# Patient Record
Sex: FEMALE | Race: WHITE | Employment: OTHER | URBAN - METROPOLITAN AREA
[De-identification: names, ages, dates, MRNs, and addresses within clinical notes are randomized per-mention and may not be internally consistent; named-entity substitution may affect disease eponyms.]

---

## 2017-01-01 ENCOUNTER — HOSPICE ADMISSION (OUTPATIENT)
Dept: HOSPICE | Facility: HOSPICE | Age: 82
End: 2017-01-01

## 2017-01-01 ENCOUNTER — PATIENT OUTREACH (OUTPATIENT)
Dept: INTERNAL MEDICINE CLINIC | Age: 82
End: 2017-01-01

## 2017-01-01 ENCOUNTER — HOME CARE VISIT (OUTPATIENT)
Dept: SCHEDULING | Facility: HOME HEALTH | Age: 82
End: 2017-01-01

## 2017-01-01 ENCOUNTER — TELEPHONE (OUTPATIENT)
Dept: INTERNAL MEDICINE CLINIC | Age: 82
End: 2017-01-01

## 2017-01-01 ENCOUNTER — APPOINTMENT (OUTPATIENT)
Dept: CT IMAGING | Age: 82
DRG: 069 | End: 2017-01-01
Attending: EMERGENCY MEDICINE
Payer: MEDICARE

## 2017-01-01 ENCOUNTER — HOSPITAL ENCOUNTER (OUTPATIENT)
Dept: LAB | Age: 82
Discharge: HOME | End: 2017-10-05
Payer: MEDICARE

## 2017-01-01 ENCOUNTER — OFFICE VISIT (OUTPATIENT)
Dept: DERMATOLOGY | Facility: AMBULATORY SURGERY CENTER | Age: 82
End: 2017-01-01

## 2017-01-01 ENCOUNTER — OFFICE VISIT (OUTPATIENT)
Dept: INTERNAL MEDICINE CLINIC | Age: 82
End: 2017-01-01

## 2017-01-01 ENCOUNTER — HOSPITAL ENCOUNTER (EMERGENCY)
Age: 82
Discharge: HOME | End: 2017-05-11
Attending: FAMILY MEDICINE

## 2017-01-01 ENCOUNTER — HOSPITAL ENCOUNTER (INPATIENT)
Age: 82
LOS: 2 days | Discharge: HOME HEALTH | DRG: 069 | End: 2017-10-16
Attending: EMERGENCY MEDICINE | Admitting: INTERNAL MEDICINE
Payer: MEDICARE

## 2017-01-01 ENCOUNTER — HOSPITAL ENCOUNTER (EMERGENCY)
Age: 82
Discharge: HOME | End: 2017-09-29
Attending: EMERGENCY MEDICINE | Admitting: EMERGENCY MEDICINE
Payer: MEDICARE

## 2017-01-01 ENCOUNTER — APPOINTMENT (OUTPATIENT)
Dept: MRI IMAGING | Age: 82
DRG: 069 | End: 2017-01-01
Attending: INTERNAL MEDICINE
Payer: MEDICARE

## 2017-01-01 VITALS
DIASTOLIC BLOOD PRESSURE: 64 MMHG | TEMPERATURE: 97.8 F | SYSTOLIC BLOOD PRESSURE: 158 MMHG | OXYGEN SATURATION: 100 % | HEIGHT: 60 IN | RESPIRATION RATE: 16 BRPM | HEART RATE: 79 BPM | WEIGHT: 123.46 LBS | BODY MASS INDEX: 24.24 KG/M2

## 2017-01-01 VITALS
BODY MASS INDEX: 23.89 KG/M2 | DIASTOLIC BLOOD PRESSURE: 70 MMHG | TEMPERATURE: 98.9 F | OXYGEN SATURATION: 100 % | HEART RATE: 72 BPM | WEIGHT: 121.7 LBS | HEIGHT: 60 IN | SYSTOLIC BLOOD PRESSURE: 176 MMHG | RESPIRATION RATE: 20 BRPM

## 2017-01-01 VITALS
RESPIRATION RATE: 20 BRPM | BODY MASS INDEX: 24.54 KG/M2 | WEIGHT: 125 LBS | OXYGEN SATURATION: 96 % | TEMPERATURE: 96.9 F | DIASTOLIC BLOOD PRESSURE: 81 MMHG | HEART RATE: 75 BPM | SYSTOLIC BLOOD PRESSURE: 139 MMHG | HEIGHT: 60 IN

## 2017-01-01 VITALS
RESPIRATION RATE: 20 BRPM | HEART RATE: 72 BPM | OXYGEN SATURATION: 100 % | DIASTOLIC BLOOD PRESSURE: 68 MMHG | TEMPERATURE: 97.6 F | WEIGHT: 120 LBS | BODY MASS INDEX: 23.56 KG/M2 | HEIGHT: 60 IN | SYSTOLIC BLOOD PRESSURE: 158 MMHG

## 2017-01-01 VITALS
BODY MASS INDEX: 22.78 KG/M2 | OXYGEN SATURATION: 99 % | HEART RATE: 68 BPM | HEIGHT: 60 IN | TEMPERATURE: 98.2 F | DIASTOLIC BLOOD PRESSURE: 76 MMHG | WEIGHT: 116 LBS | RESPIRATION RATE: 19 BRPM | SYSTOLIC BLOOD PRESSURE: 170 MMHG

## 2017-01-01 VITALS
OXYGEN SATURATION: 97 % | RESPIRATION RATE: 21 BRPM | TEMPERATURE: 97.7 F | HEIGHT: 60 IN | BODY MASS INDEX: 23.75 KG/M2 | DIASTOLIC BLOOD PRESSURE: 69 MMHG | WEIGHT: 121 LBS | HEART RATE: 74 BPM | SYSTOLIC BLOOD PRESSURE: 153 MMHG

## 2017-01-01 VITALS
HEART RATE: 71 BPM | SYSTOLIC BLOOD PRESSURE: 120 MMHG | OXYGEN SATURATION: 99 % | RESPIRATION RATE: 18 BRPM | WEIGHT: 116 LBS | HEIGHT: 60 IN | DIASTOLIC BLOOD PRESSURE: 64 MMHG | TEMPERATURE: 97.9 F | BODY MASS INDEX: 22.78 KG/M2

## 2017-01-01 VITALS
OXYGEN SATURATION: 97 % | BODY MASS INDEX: 24.54 KG/M2 | HEART RATE: 93 BPM | HEIGHT: 60 IN | TEMPERATURE: 97.8 F | SYSTOLIC BLOOD PRESSURE: 177 MMHG | DIASTOLIC BLOOD PRESSURE: 98 MMHG | WEIGHT: 125 LBS | RESPIRATION RATE: 15 BRPM

## 2017-01-01 VITALS
OXYGEN SATURATION: 98 % | BODY MASS INDEX: 24.54 KG/M2 | RESPIRATION RATE: 16 BRPM | SYSTOLIC BLOOD PRESSURE: 126 MMHG | HEART RATE: 74 BPM | HEIGHT: 60 IN | TEMPERATURE: 98 F | DIASTOLIC BLOOD PRESSURE: 62 MMHG | WEIGHT: 125 LBS

## 2017-01-01 DIAGNOSIS — N39.0 URINARY TRACT INFECTION WITHOUT HEMATURIA, SITE UNSPECIFIED: Primary | ICD-10-CM

## 2017-01-01 DIAGNOSIS — Z79.4 TYPE 2 DIABETES MELLITUS WITHOUT COMPLICATION, WITH LONG-TERM CURRENT USE OF INSULIN (HCC): Primary | ICD-10-CM

## 2017-01-01 DIAGNOSIS — N39.0 FREQUENT UTI: ICD-10-CM

## 2017-01-01 DIAGNOSIS — I10 ESSENTIAL HYPERTENSION, BENIGN: ICD-10-CM

## 2017-01-01 DIAGNOSIS — R53.1 LEFT-SIDED WEAKNESS: ICD-10-CM

## 2017-01-01 DIAGNOSIS — L57.0 ACTINIC KERATOSIS: Primary | ICD-10-CM

## 2017-01-01 DIAGNOSIS — R31.9 URINARY TRACT INFECTION WITH HEMATURIA, SITE UNSPECIFIED: Primary | ICD-10-CM

## 2017-01-01 DIAGNOSIS — Z08 FOLLOW-UP SURVEILLANCE OF SKIN CANCER, ENCOUNTER FOR: ICD-10-CM

## 2017-01-01 DIAGNOSIS — Z79.4 TYPE 2 DIABETES MELLITUS WITHOUT COMPLICATION, WITH LONG-TERM CURRENT USE OF INSULIN (HCC): ICD-10-CM

## 2017-01-01 DIAGNOSIS — E46 PROTEIN-CALORIE MALNUTRITION, UNSPECIFIED SEVERITY (HCC): Primary | ICD-10-CM

## 2017-01-01 DIAGNOSIS — L08.9 INFECTED SKIN TEAR: ICD-10-CM

## 2017-01-01 DIAGNOSIS — E55.9 VITAMIN D DEFICIENCY: ICD-10-CM

## 2017-01-01 DIAGNOSIS — E78.00 PURE HYPERCHOLESTEROLEMIA: ICD-10-CM

## 2017-01-01 DIAGNOSIS — R47.1 DYSARTHRIA: Primary | ICD-10-CM

## 2017-01-01 DIAGNOSIS — I63.19 CEREBROVASCULAR ACCIDENT (CVA) DUE TO EMBOLISM OF OTHER PRECEREBRAL ARTERY (HCC): ICD-10-CM

## 2017-01-01 DIAGNOSIS — L82.1 SEBORRHEIC KERATOSES: ICD-10-CM

## 2017-01-01 DIAGNOSIS — T14.8XXA INFECTED SKIN TEAR: ICD-10-CM

## 2017-01-01 DIAGNOSIS — F41.8 DEPRESSION WITH ANXIETY: ICD-10-CM

## 2017-01-01 DIAGNOSIS — R63.4 WEIGHT LOSS: ICD-10-CM

## 2017-01-01 DIAGNOSIS — R62.7 FTT (FAILURE TO THRIVE) IN ADULT: ICD-10-CM

## 2017-01-01 DIAGNOSIS — I63.9 CEREBROVASCULAR ACCIDENT (CVA), UNSPECIFIED MECHANISM (HCC): ICD-10-CM

## 2017-01-01 DIAGNOSIS — Z11.1 SCREENING FOR TUBERCULOSIS: Primary | ICD-10-CM

## 2017-01-01 DIAGNOSIS — N39.0 URINARY TRACT INFECTION WITH HEMATURIA, SITE UNSPECIFIED: ICD-10-CM

## 2017-01-01 DIAGNOSIS — B37.31 VAGINAL YEAST INFECTION: Primary | ICD-10-CM

## 2017-01-01 DIAGNOSIS — E11.9 TYPE 2 DIABETES MELLITUS WITHOUT COMPLICATION, WITH LONG-TERM CURRENT USE OF INSULIN (HCC): ICD-10-CM

## 2017-01-01 DIAGNOSIS — Z85.828 FOLLOW-UP SURVEILLANCE OF SKIN CANCER, ENCOUNTER FOR: ICD-10-CM

## 2017-01-01 DIAGNOSIS — F32.A DEPRESSION, UNSPECIFIED DEPRESSION TYPE: ICD-10-CM

## 2017-01-01 DIAGNOSIS — G47.00 INSOMNIA, UNSPECIFIED TYPE: ICD-10-CM

## 2017-01-01 DIAGNOSIS — I25.10 ATHEROSCLEROSIS OF NATIVE CORONARY ARTERY OF NATIVE HEART WITHOUT ANGINA PECTORIS: ICD-10-CM

## 2017-01-01 DIAGNOSIS — R31.9 URINARY TRACT INFECTION WITH HEMATURIA, SITE UNSPECIFIED: ICD-10-CM

## 2017-01-01 DIAGNOSIS — E11.9 TYPE 2 DIABETES MELLITUS WITHOUT COMPLICATION, WITH LONG-TERM CURRENT USE OF INSULIN (HCC): Primary | ICD-10-CM

## 2017-01-01 DIAGNOSIS — L90.5 SCAR CONDITION AND FIBROSIS OF SKIN: ICD-10-CM

## 2017-01-01 DIAGNOSIS — L03.115 CELLULITIS OF RIGHT LOWER EXTREMITY: Primary | ICD-10-CM

## 2017-01-01 DIAGNOSIS — R30.0 DYSURIA: ICD-10-CM

## 2017-01-01 DIAGNOSIS — L03.116 CELLULITIS OF LEG, LEFT: Primary | ICD-10-CM

## 2017-01-01 DIAGNOSIS — L70.8 DEMODEX ACNE: ICD-10-CM

## 2017-01-01 DIAGNOSIS — Z85.828 HISTORY OF NONMELANOMA SKIN CANCER: ICD-10-CM

## 2017-01-01 DIAGNOSIS — R32 URINARY INCONTINENCE, UNSPECIFIED TYPE: Primary | ICD-10-CM

## 2017-01-01 DIAGNOSIS — S81.802A OPEN WOUND OF LEFT LOWER LEG, INITIAL ENCOUNTER: ICD-10-CM

## 2017-01-01 DIAGNOSIS — N39.0 RECURRENT UTI: ICD-10-CM

## 2017-01-01 DIAGNOSIS — N39.0 URINARY TRACT INFECTION WITH HEMATURIA, SITE UNSPECIFIED: Primary | ICD-10-CM

## 2017-01-01 DIAGNOSIS — Z11.1 SCREENING FOR TUBERCULOSIS: ICD-10-CM

## 2017-01-01 LAB
25(OH)D3+25(OH)D2 SERPL-MCNC: 22.2 NG/ML (ref 30–100)
ALBUMIN SERPL-MCNC: 3.4 G/DL (ref 3.5–5)
ALBUMIN SERPL-MCNC: 3.9 G/DL (ref 3.5–4.7)
ALBUMIN SERPL-MCNC: 4.2 G/DL (ref 3.5–4.7)
ALBUMIN/GLOB SERPL: 1.1 {RATIO} (ref 1.1–2.2)
ALBUMIN/GLOB SERPL: 2 {RATIO} (ref 1.2–2.2)
ALBUMIN/GLOB SERPL: 2.1 {RATIO} (ref 1.1–2.5)
ALP SERPL-CCNC: 112 IU/L (ref 39–117)
ALP SERPL-CCNC: 130 IU/L (ref 39–117)
ALP SERPL-CCNC: 139 U/L (ref 45–117)
ALT SERPL-CCNC: 12 IU/L (ref 0–32)
ALT SERPL-CCNC: 13 IU/L (ref 0–32)
ALT SERPL-CCNC: 22 U/L (ref 12–78)
ANION GAP BLD CALC-SCNC: 20 MMOL/L (ref 5–15)
ANION GAP SERPL CALC-SCNC: 8 MMOL/L (ref 5–15)
ANION GAP SERPL CALC-SCNC: 9 MMOL/L (ref 5–15)
APPEARANCE UR: ABNORMAL
APTT PPP: 47.6 SEC (ref 22.1–32.5)
AST SERPL-CCNC: 13 IU/L (ref 0–40)
AST SERPL-CCNC: 18 U/L (ref 15–37)
AST SERPL-CCNC: 20 IU/L (ref 0–40)
BACTERIA SPEC CULT: ABNORMAL
BACTERIA URNS QL MICRO: ABNORMAL /HPF
BASOPHILS # BLD AUTO: 0 X10E3/UL (ref 0–0.2)
BASOPHILS # BLD AUTO: 0 X10E3/UL (ref 0–0.2)
BASOPHILS # BLD: 0 K/UL (ref 0–0.1)
BASOPHILS NFR BLD AUTO: 0 %
BASOPHILS NFR BLD AUTO: 0 %
BASOPHILS NFR BLD: 0 % (ref 0–1)
BILIRUB SERPL-MCNC: 0.3 MG/DL (ref 0.2–1)
BILIRUB SERPL-MCNC: 0.3 MG/DL (ref 0–1.2)
BILIRUB SERPL-MCNC: 0.4 MG/DL (ref 0–1.2)
BILIRUB UR QL STRIP: NEGATIVE
BILIRUB UR QL: NEGATIVE
BUN BLD-MCNC: 6 MG/DL (ref 9–20)
BUN SERPL-MCNC: 10 MG/DL (ref 8–27)
BUN SERPL-MCNC: 7 MG/DL (ref 6–20)
BUN SERPL-MCNC: 8 MG/DL (ref 8–27)
BUN SERPL-MCNC: 9 MG/DL (ref 6–20)
BUN/CREAT SERPL: 12 (ref 12–20)
BUN/CREAT SERPL: 14 (ref 12–28)
BUN/CREAT SERPL: 18 (ref 11–26)
BUN/CREAT SERPL: 18 (ref 12–20)
CA-I BLD-MCNC: 1.18 MMOL/L (ref 1.12–1.32)
CALCIUM SERPL-MCNC: 10 MG/DL (ref 8.7–10.3)
CALCIUM SERPL-MCNC: 8.3 MG/DL (ref 8.5–10.1)
CALCIUM SERPL-MCNC: 8.9 MG/DL (ref 8.5–10.1)
CALCIUM SERPL-MCNC: 9 MG/DL (ref 8.7–10.3)
CC UR VC: ABNORMAL
CHLORIDE BLD-SCNC: 104 MMOL/L (ref 98–107)
CHLORIDE SERPL-SCNC: 101 MMOL/L (ref 96–106)
CHLORIDE SERPL-SCNC: 102 MMOL/L (ref 96–106)
CHLORIDE SERPL-SCNC: 105 MMOL/L (ref 97–108)
CHLORIDE SERPL-SCNC: 106 MMOL/L (ref 97–108)
CHOLEST SERPL-MCNC: 128 MG/DL
CHOLEST SERPL-MCNC: 148 MG/DL (ref 100–199)
CO2 BLD-SCNC: 24 MMOL/L (ref 21–32)
CO2 SERPL-SCNC: 24 MMOL/L (ref 18–29)
CO2 SERPL-SCNC: 25 MMOL/L (ref 21–32)
CO2 SERPL-SCNC: 26 MMOL/L (ref 21–32)
CO2 SERPL-SCNC: 29 MMOL/L (ref 18–29)
COLOR UR: ABNORMAL
CREAT BLD-MCNC: 0.5 MG/DL (ref 0.6–1.3)
CREAT SERPL-MCNC: 0.5 MG/DL (ref 0.55–1.02)
CREAT SERPL-MCNC: 0.56 MG/DL (ref 0.57–1)
CREAT SERPL-MCNC: 0.59 MG/DL (ref 0.55–1.02)
CREAT SERPL-MCNC: 0.59 MG/DL (ref 0.57–1)
EOSINOPHIL # BLD AUTO: 0.1 X10E3/UL (ref 0–0.4)
EOSINOPHIL # BLD AUTO: 0.1 X10E3/UL (ref 0–0.4)
EOSINOPHIL # BLD: 0.1 K/UL (ref 0–0.4)
EOSINOPHIL NFR BLD AUTO: 1 %
EOSINOPHIL NFR BLD AUTO: 1 %
EOSINOPHIL NFR BLD: 1 % (ref 0–7)
EPITH CASTS URNS QL MICRO: ABNORMAL /LPF
ERYTHROCYTE [DISTWIDTH] IN BLOOD BY AUTOMATED COUNT: 13.7 % (ref 11.5–14.5)
ERYTHROCYTE [DISTWIDTH] IN BLOOD BY AUTOMATED COUNT: 14 % (ref 11.5–14.5)
ERYTHROCYTE [DISTWIDTH] IN BLOOD BY AUTOMATED COUNT: 14.4 % (ref 12.3–15.4)
ERYTHROCYTE [DISTWIDTH] IN BLOOD BY AUTOMATED COUNT: 14.7 % (ref 12.3–15.4)
EST. AVERAGE GLUCOSE BLD GHB EST-MCNC: 151 MG/DL
EST. AVERAGE GLUCOSE BLD GHB EST-MCNC: 194 MG/DL
GLOBULIN SER CALC-MCNC: 2 G/DL (ref 1.5–4.5)
GLOBULIN SER CALC-MCNC: 2 G/DL (ref 1.5–4.5)
GLOBULIN SER CALC-MCNC: 3.1 G/DL (ref 2–4)
GLUCOSE BLD STRIP.AUTO-MCNC: 102 MG/DL (ref 65–100)
GLUCOSE BLD STRIP.AUTO-MCNC: 112 MG/DL (ref 65–100)
GLUCOSE BLD STRIP.AUTO-MCNC: 150 MG/DL (ref 65–100)
GLUCOSE BLD STRIP.AUTO-MCNC: 171 MG/DL (ref 65–100)
GLUCOSE BLD STRIP.AUTO-MCNC: 176 MG/DL (ref 65–100)
GLUCOSE BLD STRIP.AUTO-MCNC: 206 MG/DL (ref 65–100)
GLUCOSE BLD STRIP.AUTO-MCNC: 211 MG/DL (ref 65–100)
GLUCOSE BLD STRIP.AUTO-MCNC: 64 MG/DL (ref 65–100)
GLUCOSE BLD STRIP.AUTO-MCNC: 66 MG/DL (ref 65–100)
GLUCOSE BLD STRIP.AUTO-MCNC: 69 MG/DL (ref 65–100)
GLUCOSE BLD STRIP.AUTO-MCNC: 72 MG/DL (ref 65–100)
GLUCOSE BLD STRIP.AUTO-MCNC: 75 MG/DL (ref 65–100)
GLUCOSE BLD STRIP.AUTO-MCNC: 79 MG/DL (ref 65–100)
GLUCOSE BLD STRIP.AUTO-MCNC: 88 MG/DL (ref 65–100)
GLUCOSE BLD STRIP.AUTO-MCNC: 92 MG/DL (ref 65–100)
GLUCOSE BLD-MCNC: 68 MG/DL (ref 65–100)
GLUCOSE SERPL-MCNC: 158 MG/DL (ref 65–100)
GLUCOSE SERPL-MCNC: 204 MG/DL (ref 65–99)
GLUCOSE SERPL-MCNC: 287 MG/DL (ref 65–99)
GLUCOSE SERPL-MCNC: 81 MG/DL (ref 65–100)
GLUCOSE UR STRIP.AUTO-MCNC: NEGATIVE MG/DL
GLUCOSE UR-MCNC: NEGATIVE MG/DL
HBA1C MFR BLD: 6.9 % (ref 4.8–5.6)
HBA1C MFR BLD: 8.4 % (ref 4.8–5.6)
HCT VFR BLD AUTO: 40.1 % (ref 35–47)
HCT VFR BLD AUTO: 40.3 % (ref 35–47)
HCT VFR BLD AUTO: 42.2 % (ref 34–46.6)
HCT VFR BLD AUTO: 43 % (ref 34–46.6)
HCT VFR BLD CALC: 47 % (ref 35–47)
HDLC SERPL-MCNC: 45 MG/DL
HDLC SERPL-MCNC: 51 MG/DL
HDLC SERPL: 2.8 {RATIO} (ref 0–5)
HGB BLD-MCNC: 13.5 G/DL (ref 11.5–16)
HGB BLD-MCNC: 13.9 G/DL (ref 11.5–16)
HGB BLD-MCNC: 14.2 G/DL (ref 11.1–15.9)
HGB BLD-MCNC: 14.2 G/DL (ref 11.1–15.9)
HGB BLD-MCNC: 16 GM/DL (ref 11.5–16)
HGB UR QL STRIP: ABNORMAL
IMM GRANULOCYTES # BLD: 0 X10E3/UL (ref 0–0.1)
IMM GRANULOCYTES # BLD: 0 X10E3/UL (ref 0–0.1)
IMM GRANULOCYTES NFR BLD: 0 %
IMM GRANULOCYTES NFR BLD: 0 %
INR BLD: 1.8 (ref 0.9–1.2)
INR PPP: 1.5 (ref 0.9–1.1)
INTERPRETATION, 910389: NORMAL
KETONES P FAST UR STRIP-MCNC: NORMAL MG/DL
KETONES UR QL STRIP.AUTO: NEGATIVE MG/DL
LDLC SERPL CALC-MCNC: 64 MG/DL (ref 0–100)
LDLC SERPL CALC-MCNC: 72 MG/DL (ref 0–99)
LEUKOCYTE ESTERASE UR QL STRIP.AUTO: ABNORMAL
LIPID PROFILE,FLP: NORMAL
LYMPHOCYTES # BLD AUTO: 2.4 X10E3/UL (ref 0.7–3.1)
LYMPHOCYTES # BLD AUTO: 2.8 X10E3/UL (ref 0.7–3.1)
LYMPHOCYTES # BLD: 1.7 K/UL (ref 0.8–3.5)
LYMPHOCYTES NFR BLD AUTO: 20 %
LYMPHOCYTES NFR BLD AUTO: 26 %
LYMPHOCYTES NFR BLD: 16 % (ref 12–49)
Lab: NORMAL
MCH RBC QN AUTO: 30.6 PG (ref 26–34)
MCH RBC QN AUTO: 30.7 PG (ref 26.6–33)
MCH RBC QN AUTO: 31.1 PG (ref 26.6–33)
MCH RBC QN AUTO: 31.4 PG (ref 26–34)
MCHC RBC AUTO-ENTMCNC: 33 G/DL (ref 31.5–35.7)
MCHC RBC AUTO-ENTMCNC: 33.6 G/DL (ref 31.5–35.7)
MCHC RBC AUTO-ENTMCNC: 33.7 G/DL (ref 30–36.5)
MCHC RBC AUTO-ENTMCNC: 34.5 G/DL (ref 30–36.5)
MCV RBC AUTO: 90.9 FL (ref 80–99)
MCV RBC AUTO: 91 FL (ref 80–99)
MCV RBC AUTO: 92 FL (ref 79–97)
MCV RBC AUTO: 93 FL (ref 79–97)
MONOCYTES # BLD AUTO: 0.7 X10E3/UL (ref 0.1–0.9)
MONOCYTES # BLD AUTO: 0.9 X10E3/UL (ref 0.1–0.9)
MONOCYTES # BLD: 0.8 K/UL (ref 0–1)
MONOCYTES NFR BLD AUTO: 6 %
MONOCYTES NFR BLD AUTO: 8 %
MONOCYTES NFR BLD: 7 % (ref 5–13)
NEUTROPHILS # BLD AUTO: 6.9 X10E3/UL (ref 1.4–7)
NEUTROPHILS # BLD AUTO: 8.9 X10E3/UL (ref 1.4–7)
NEUTROPHILS NFR BLD AUTO: 65 %
NEUTROPHILS NFR BLD AUTO: 73 %
NEUTS SEG # BLD: 8.4 K/UL (ref 1.8–8)
NEUTS SEG NFR BLD: 76 % (ref 32–75)
NITRITE UR QL STRIP.AUTO: NEGATIVE
PH UR STRIP: 5 [PH] (ref 4.6–8)
PH UR STRIP: 7 [PH] (ref 5–8)
PLATELET # BLD AUTO: 159 K/UL (ref 150–400)
PLATELET # BLD AUTO: 165 X10E3/UL (ref 150–379)
PLATELET # BLD AUTO: 180 K/UL (ref 150–400)
PLATELET # BLD AUTO: 185 X10E3/UL (ref 150–379)
POTASSIUM BLD-SCNC: 3.4 MMOL/L (ref 3.5–5.1)
POTASSIUM SERPL-SCNC: 3.4 MMOL/L (ref 3.5–5.1)
POTASSIUM SERPL-SCNC: 3.9 MMOL/L (ref 3.5–5.2)
POTASSIUM SERPL-SCNC: 3.9 MMOL/L (ref 3.5–5.2)
POTASSIUM SERPL-SCNC: 4 MMOL/L (ref 3.5–5.1)
PROT SERPL-MCNC: 5.9 G/DL (ref 6–8.5)
PROT SERPL-MCNC: 6.2 G/DL (ref 6–8.5)
PROT SERPL-MCNC: 6.5 G/DL (ref 6.4–8.2)
PROT UR QL STRIP: NORMAL MG/DL
PROT UR STRIP-MCNC: NEGATIVE MG/DL
PROTHROMBIN TIME: 15.5 SEC (ref 9–11.1)
RBC # BLD AUTO: 4.41 M/UL (ref 3.8–5.2)
RBC # BLD AUTO: 4.43 M/UL (ref 3.8–5.2)
RBC # BLD AUTO: 4.57 X10E6/UL (ref 3.77–5.28)
RBC # BLD AUTO: 4.62 X10E6/UL (ref 3.77–5.28)
RBC #/AREA URNS HPF: ABNORMAL /HPF (ref 0–5)
SERVICE CMNT-IMP: ABNORMAL
SERVICE CMNT-IMP: NORMAL
SODIUM BLD-SCNC: 143 MMOL/L (ref 136–145)
SODIUM SERPL-SCNC: 139 MMOL/L (ref 136–145)
SODIUM SERPL-SCNC: 140 MMOL/L (ref 136–145)
SODIUM SERPL-SCNC: 142 MMOL/L (ref 134–144)
SODIUM SERPL-SCNC: 144 MMOL/L (ref 134–144)
SP GR UR REFRACTOMETRY: 1 (ref 1–1.03)
SP GR UR STRIP: 1.02 (ref 1–1.03)
THERAPEUTIC RANGE,PTTT: ABNORMAL SECS (ref 58–77)
TRIGL SERPL-MCNC: 125 MG/DL (ref 0–149)
TRIGL SERPL-MCNC: 95 MG/DL (ref ?–150)
TSH SERPL DL<=0.05 MIU/L-ACNC: 1.27 UIU/ML (ref 0.36–3.74)
UA UROBILINOGEN AMB POC: NORMAL (ref 0.2–1)
UA: UC IF INDICATED,UAUC: ABNORMAL
URINALYSIS CLARITY POC: CLEAR
URINALYSIS COLOR POC: YELLOW
URINE BLOOD POC: NORMAL
URINE LEUKOCYTES POC: NORMAL
URINE NITRITES POC: NEGATIVE
UROBILINOGEN UR QL STRIP.AUTO: 0.2 EU/DL (ref 0.2–1)
VLDLC SERPL CALC-MCNC: 19 MG/DL
VLDLC SERPL CALC-MCNC: 25 MG/DL (ref 5–40)
WBC # BLD AUTO: 10.7 X10E3/UL (ref 3.4–10.8)
WBC # BLD AUTO: 11 K/UL (ref 3.6–11)
WBC # BLD AUTO: 12.2 X10E3/UL (ref 3.4–10.8)
WBC # BLD AUTO: 8.9 K/UL (ref 3.6–11)
WBC URNS QL MICRO: ABNORMAL /HPF (ref 0–4)

## 2017-01-01 PROCEDURE — 36415 COLL VENOUS BLD VENIPUNCTURE: CPT | Performed by: EMERGENCY MEDICINE

## 2017-01-01 PROCEDURE — 96365 THER/PROPH/DIAG IV INF INIT: CPT

## 2017-01-01 PROCEDURE — 74011250636 HC RX REV CODE- 250/636: Performed by: INTERNAL MEDICINE

## 2017-01-01 PROCEDURE — L3710 EO ELAS W/METAL JNTS PRE OTS: HCPCS

## 2017-01-01 PROCEDURE — 82306 VITAMIN D 25 HYDROXY: CPT

## 2017-01-01 PROCEDURE — 81001 URINALYSIS AUTO W/SCOPE: CPT | Performed by: EMERGENCY MEDICINE

## 2017-01-01 PROCEDURE — 84443 ASSAY THYROID STIM HORMONE: CPT | Performed by: INTERNAL MEDICINE

## 2017-01-01 PROCEDURE — 70450 CT HEAD/BRAIN W/O DYE: CPT

## 2017-01-01 PROCEDURE — 74011250637 HC RX REV CODE- 250/637: Performed by: INTERNAL MEDICINE

## 2017-01-01 PROCEDURE — 80061 LIPID PANEL: CPT | Performed by: INTERNAL MEDICINE

## 2017-01-01 PROCEDURE — 97116 GAIT TRAINING THERAPY: CPT

## 2017-01-01 PROCEDURE — 74011250637 HC RX REV CODE- 250/637: Performed by: PSYCHIATRY & NEUROLOGY

## 2017-01-01 PROCEDURE — 74011250636 HC RX REV CODE- 250/636: Performed by: EMERGENCY MEDICINE

## 2017-01-01 PROCEDURE — 74011000250 HC RX REV CODE- 250: Performed by: EMERGENCY MEDICINE

## 2017-01-01 PROCEDURE — 99283 EMERGENCY DEPT VISIT LOW MDM: CPT

## 2017-01-01 PROCEDURE — 93306 TTE W/DOPPLER COMPLETE: CPT

## 2017-01-01 PROCEDURE — 97165 OT EVAL LOW COMPLEX 30 MIN: CPT

## 2017-01-01 PROCEDURE — 80048 BASIC METABOLIC PNL TOTAL CA: CPT | Performed by: INTERNAL MEDICINE

## 2017-01-01 PROCEDURE — 85610 PROTHROMBIN TIME: CPT

## 2017-01-01 PROCEDURE — G8979 MOBILITY GOAL STATUS: HCPCS

## 2017-01-01 PROCEDURE — 65660000000 HC RM CCU STEPDOWN

## 2017-01-01 PROCEDURE — 97161 PT EVAL LOW COMPLEX 20 MIN: CPT

## 2017-01-01 PROCEDURE — 85027 COMPLETE CBC AUTOMATED: CPT | Performed by: INTERNAL MEDICINE

## 2017-01-01 PROCEDURE — 99285 EMERGENCY DEPT VISIT HI MDM: CPT

## 2017-01-01 PROCEDURE — 85025 COMPLETE CBC W/AUTO DIFF WBC: CPT | Performed by: EMERGENCY MEDICINE

## 2017-01-01 PROCEDURE — 87077 CULTURE AEROBIC IDENTIFY: CPT | Performed by: EMERGENCY MEDICINE

## 2017-01-01 PROCEDURE — 87186 SC STD MICRODIL/AGAR DIL: CPT | Performed by: EMERGENCY MEDICINE

## 2017-01-01 PROCEDURE — 85730 THROMBOPLASTIN TIME PARTIAL: CPT | Performed by: EMERGENCY MEDICINE

## 2017-01-01 PROCEDURE — 36415 COLL VENOUS BLD VENIPUNCTURE: CPT | Performed by: INTERNAL MEDICINE

## 2017-01-01 PROCEDURE — G8978 MOBILITY CURRENT STATUS: HCPCS

## 2017-01-01 PROCEDURE — 87086 URINE CULTURE/COLONY COUNT: CPT | Performed by: EMERGENCY MEDICINE

## 2017-01-01 PROCEDURE — 74011636637 HC RX REV CODE- 636/637: Performed by: INTERNAL MEDICINE

## 2017-01-01 PROCEDURE — 74011636320 HC RX REV CODE- 636/320: Performed by: EMERGENCY MEDICINE

## 2017-01-01 PROCEDURE — 70551 MRI BRAIN STEM W/O DYE: CPT

## 2017-01-01 PROCEDURE — 80047 BASIC METABLC PNL IONIZED CA: CPT

## 2017-01-01 PROCEDURE — 74011000258 HC RX REV CODE- 258: Performed by: EMERGENCY MEDICINE

## 2017-01-01 PROCEDURE — 70496 CT ANGIOGRAPHY HEAD: CPT

## 2017-01-01 PROCEDURE — 83036 HEMOGLOBIN GLYCOSYLATED A1C: CPT

## 2017-01-01 PROCEDURE — 82962 GLUCOSE BLOOD TEST: CPT

## 2017-01-01 PROCEDURE — 97535 SELF CARE MNGMENT TRAINING: CPT

## 2017-01-01 PROCEDURE — 80053 COMPREHEN METABOLIC PANEL: CPT

## 2017-01-01 PROCEDURE — 80053 COMPREHEN METABOLIC PANEL: CPT | Performed by: EMERGENCY MEDICINE

## 2017-01-01 PROCEDURE — 85025 COMPLETE CBC W/AUTO DIFF WBC: CPT

## 2017-01-01 PROCEDURE — 36415 COLL VENOUS BLD VENIPUNCTURE: CPT

## 2017-01-01 PROCEDURE — 85610 PROTHROMBIN TIME: CPT | Performed by: EMERGENCY MEDICINE

## 2017-01-01 PROCEDURE — 74011000258 HC RX REV CODE- 258: Performed by: INTERNAL MEDICINE

## 2017-01-01 RX ORDER — TRAZODONE HYDROCHLORIDE 50 MG/1
TABLET ORAL
Qty: 30 TAB | Refills: 0 | Status: SHIPPED | OUTPATIENT
Start: 2017-01-01

## 2017-01-01 RX ORDER — HALOPERIDOL 5 MG/ML
1 INJECTION INTRAMUSCULAR ONCE
Status: COMPLETED | OUTPATIENT
Start: 2017-01-01 | End: 2017-01-01

## 2017-01-01 RX ORDER — PRAVASTATIN SODIUM 10 MG/1
20 TABLET ORAL
Status: DISCONTINUED | OUTPATIENT
Start: 2017-01-01 | End: 2017-01-01 | Stop reason: HOSPADM

## 2017-01-01 RX ORDER — AMLODIPINE AND BENAZEPRIL HYDROCHLORIDE 5; 40 MG/1; MG/1
CAPSULE ORAL
Qty: 90 CAP | Refills: 0 | Status: SHIPPED | OUTPATIENT
Start: 2017-01-01 | End: 2017-01-01 | Stop reason: DRUGHIGH

## 2017-01-01 RX ORDER — FLUCONAZOLE 150 MG/1
150 TABLET ORAL
Qty: 1 TAB | Refills: 0 | Status: SHIPPED | OUTPATIENT
Start: 2017-01-01 | End: 2017-01-01

## 2017-01-01 RX ORDER — ENOXAPARIN SODIUM 100 MG/ML
40 INJECTION SUBCUTANEOUS EVERY 24 HOURS
Status: DISCONTINUED | OUTPATIENT
Start: 2017-01-01 | End: 2017-01-01 | Stop reason: DRUGHIGH

## 2017-01-01 RX ORDER — ASPIRIN 81 MG/1
81 TABLET ORAL DAILY
Status: DISCONTINUED | OUTPATIENT
Start: 2017-01-01 | End: 2017-01-01 | Stop reason: HOSPADM

## 2017-01-01 RX ORDER — MAGNESIUM SULFATE 100 %
4 CRYSTALS MISCELLANEOUS AS NEEDED
Status: DISCONTINUED | OUTPATIENT
Start: 2017-01-01 | End: 2017-01-01 | Stop reason: HOSPADM

## 2017-01-01 RX ORDER — PEN NEEDLE, DIABETIC 31 GX5/16"
NEEDLE, DISPOSABLE MISCELLANEOUS
Refills: 0 | COMMUNITY
Start: 2017-01-01

## 2017-01-01 RX ORDER — POTASSIUM CHLORIDE 750 MG/1
20 TABLET, FILM COATED, EXTENDED RELEASE ORAL
Status: COMPLETED | OUTPATIENT
Start: 2017-01-01 | End: 2017-01-01

## 2017-01-01 RX ORDER — DOXYCYCLINE 100 MG/1
100 CAPSULE ORAL 2 TIMES DAILY
COMMUNITY
End: 2017-01-01

## 2017-01-01 RX ORDER — CIPROFLOXACIN 250 MG/1
250 TABLET, FILM COATED ORAL 2 TIMES DAILY
Qty: 14 TAB | Refills: 0 | Status: SHIPPED | OUTPATIENT
Start: 2017-01-01 | End: 2017-01-01 | Stop reason: SDUPTHER

## 2017-01-01 RX ORDER — FACIAL-BODY WIPES
10 EACH TOPICAL DAILY PRN
Status: DISCONTINUED | OUTPATIENT
Start: 2017-01-01 | End: 2017-01-01 | Stop reason: HOSPADM

## 2017-01-01 RX ORDER — CIPROFLOXACIN 250 MG/1
250 TABLET, FILM COATED ORAL 2 TIMES DAILY
Qty: 10 TAB | Refills: 0 | Status: SHIPPED | OUTPATIENT
Start: 2017-01-01 | End: 2017-01-01

## 2017-01-01 RX ORDER — INSULIN GLARGINE 100 [IU]/ML
10 INJECTION, SOLUTION SUBCUTANEOUS DAILY
Status: DISCONTINUED | OUTPATIENT
Start: 2017-01-01 | End: 2017-01-01 | Stop reason: HOSPADM

## 2017-01-01 RX ORDER — LEVOFLOXACIN 250 MG/1
TABLET ORAL
Refills: 0 | COMMUNITY
Start: 2017-01-01

## 2017-01-01 RX ORDER — FLUCONAZOLE 150 MG/1
150 TABLET ORAL DAILY
Qty: 1 TAB | Refills: 0 | Status: SHIPPED | OUTPATIENT
Start: 2017-01-01 | End: 2017-01-01

## 2017-01-01 RX ORDER — ACETAMINOPHEN 325 MG/1
650 TABLET ORAL
Status: DISCONTINUED | OUTPATIENT
Start: 2017-01-01 | End: 2017-01-01 | Stop reason: HOSPADM

## 2017-01-01 RX ORDER — FLUCONAZOLE 200 MG/1
200 TABLET ORAL DAILY
Qty: 7 TAB | Refills: 0 | Status: SHIPPED | OUTPATIENT
Start: 2017-01-01 | End: 2017-01-01

## 2017-01-01 RX ORDER — SODIUM CHLORIDE 9 MG/ML
50 INJECTION, SOLUTION INTRAVENOUS
Status: COMPLETED | OUTPATIENT
Start: 2017-01-01 | End: 2017-01-01

## 2017-01-01 RX ORDER — SODIUM CHLORIDE 9 MG/ML
50 INJECTION, SOLUTION INTRAVENOUS CONTINUOUS
Status: DISCONTINUED | OUTPATIENT
Start: 2017-01-01 | End: 2017-01-01

## 2017-01-01 RX ORDER — LEVOFLOXACIN 250 MG/1
250 TABLET ORAL DAILY
Qty: 10 TAB | Refills: 0 | Status: SHIPPED | OUTPATIENT
Start: 2017-01-01 | End: 2017-01-01

## 2017-01-01 RX ORDER — LEVOFLOXACIN 250 MG/1
250 TABLET ORAL DAILY
Qty: 10 TAB | Refills: 0 | Status: SHIPPED | OUTPATIENT
Start: 2017-01-01 | End: 2017-01-01 | Stop reason: SDUPTHER

## 2017-01-01 RX ORDER — INSULIN GLARGINE 100 [IU]/ML
25 INJECTION, SOLUTION SUBCUTANEOUS DAILY
Qty: 15 ML | Refills: 0 | Status: SHIPPED | OUTPATIENT
Start: 2017-01-01

## 2017-01-01 RX ORDER — AMLODIPINE AND BENAZEPRIL HYDROCHLORIDE 5; 40 MG/1; MG/1
CAPSULE ORAL
Qty: 90 CAP | Refills: 0 | Status: SHIPPED | OUTPATIENT
Start: 2017-01-01

## 2017-01-01 RX ORDER — ACETAMINOPHEN 650 MG/1
650 SUPPOSITORY RECTAL
Status: DISCONTINUED | OUTPATIENT
Start: 2017-01-01 | End: 2017-01-01 | Stop reason: HOSPADM

## 2017-01-01 RX ORDER — ASPIRIN 81 MG/1
81 TABLET ORAL DAILY
Qty: 30 TAB | Refills: 0 | Status: SHIPPED | OUTPATIENT
Start: 2017-01-01 | End: 2017-01-01

## 2017-01-01 RX ORDER — INSULIN GLARGINE 100 [IU]/ML
INJECTION, SOLUTION SUBCUTANEOUS
Qty: 1 VIAL | Refills: 11 | Status: SHIPPED | OUTPATIENT
Start: 2017-01-01 | End: 2017-01-01

## 2017-01-01 RX ORDER — TRAZODONE HYDROCHLORIDE 50 MG/1
25 TABLET ORAL
Qty: 30 TAB | Refills: 0 | Status: SHIPPED | OUTPATIENT
Start: 2017-01-01 | End: 2017-01-01 | Stop reason: SDUPTHER

## 2017-01-01 RX ORDER — INSULIN LISPRO 100 [IU]/ML
INJECTION, SOLUTION INTRAVENOUS; SUBCUTANEOUS
Status: DISCONTINUED | OUTPATIENT
Start: 2017-01-01 | End: 2017-01-01 | Stop reason: HOSPADM

## 2017-01-01 RX ORDER — DOXYCYCLINE HYCLATE 100 MG
100 TABLET ORAL 2 TIMES DAILY
Qty: 20 TAB | Refills: 0 | Status: SHIPPED | OUTPATIENT
Start: 2017-01-01 | End: 2017-01-01

## 2017-01-01 RX ORDER — INSULIN GLARGINE 100 [IU]/ML
32 INJECTION, SOLUTION SUBCUTANEOUS DAILY
Status: ON HOLD | COMMUNITY
End: 2017-01-01

## 2017-01-01 RX ORDER — FLUCONAZOLE 200 MG/1
200 TABLET ORAL DAILY
Status: DISCONTINUED | OUTPATIENT
Start: 2017-01-01 | End: 2017-01-01 | Stop reason: HOSPADM

## 2017-01-01 RX ORDER — MIRTAZAPINE 7.5 MG/1
7.5 TABLET, FILM COATED ORAL
Qty: 30 TAB | Refills: 4 | Status: SHIPPED | OUTPATIENT
Start: 2017-01-01 | End: 2017-01-01

## 2017-01-01 RX ORDER — MIRTAZAPINE 7.5 MG/1
TABLET, FILM COATED ORAL
Refills: 4 | COMMUNITY
Start: 2017-01-01

## 2017-01-01 RX ORDER — PEN NEEDLE, DIABETIC 31 GX5/16"
NEEDLE, DISPOSABLE MISCELLANEOUS
Qty: 90 PEN NEEDLE | Refills: 0 | Status: SHIPPED | OUTPATIENT
Start: 2017-01-01 | End: 2017-01-01

## 2017-01-01 RX ORDER — DEXTROSE 50 % IN WATER (D50W) INTRAVENOUS SYRINGE
12.5-25 AS NEEDED
Status: DISCONTINUED | OUTPATIENT
Start: 2017-01-01 | End: 2017-01-01 | Stop reason: HOSPADM

## 2017-01-01 RX ORDER — AMLODIPINE AND BENAZEPRIL HYDROCHLORIDE 5; 40 MG/1; MG/1
1 CAPSULE ORAL
COMMUNITY

## 2017-01-01 RX ORDER — INSULIN GLARGINE 100 [IU]/ML
20 INJECTION, SOLUTION SUBCUTANEOUS DAILY
Status: DISCONTINUED | OUTPATIENT
Start: 2017-01-01 | End: 2017-01-01

## 2017-01-01 RX ORDER — SODIUM CHLORIDE 0.9 % (FLUSH) 0.9 %
10 SYRINGE (ML) INJECTION
Status: CANCELLED | OUTPATIENT
Start: 2017-01-01 | End: 2017-01-01

## 2017-01-01 RX ORDER — SODIUM CHLORIDE 0.9 % (FLUSH) 0.9 %
5-10 SYRINGE (ML) INJECTION EVERY 8 HOURS
Status: DISCONTINUED | OUTPATIENT
Start: 2017-01-01 | End: 2017-01-01 | Stop reason: HOSPADM

## 2017-01-01 RX ORDER — SODIUM CHLORIDE 0.9 % (FLUSH) 0.9 %
5-10 SYRINGE (ML) INJECTION AS NEEDED
Status: DISCONTINUED | OUTPATIENT
Start: 2017-01-01 | End: 2017-01-01 | Stop reason: HOSPADM

## 2017-01-01 RX ORDER — CALCIUM CARBONATE 200(500)MG
200 TABLET,CHEWABLE ORAL
Status: DISCONTINUED | OUTPATIENT
Start: 2017-01-01 | End: 2017-01-01 | Stop reason: HOSPADM

## 2017-01-01 RX ORDER — LORAZEPAM 0.5 MG/1
0.5 TABLET ORAL 2 TIMES DAILY
Qty: 14 TAB | Refills: 0 | Status: SHIPPED | OUTPATIENT
Start: 2017-01-01 | End: 2017-01-01

## 2017-01-01 RX ORDER — SODIUM CHLORIDE 9 MG/ML
50 INJECTION, SOLUTION INTRAVENOUS
Status: CANCELLED | OUTPATIENT
Start: 2017-01-01 | End: 2017-01-01

## 2017-01-01 RX ORDER — LABETALOL HYDROCHLORIDE 5 MG/ML
5 INJECTION, SOLUTION INTRAVENOUS
Status: DISCONTINUED | OUTPATIENT
Start: 2017-01-01 | End: 2017-01-01

## 2017-01-01 RX ORDER — NITROFURANTOIN 25; 75 MG/1; MG/1
100 CAPSULE ORAL 2 TIMES DAILY
Qty: 14 CAP | Refills: 0 | Status: SHIPPED | OUTPATIENT
Start: 2017-01-01 | End: 2017-01-01

## 2017-01-01 RX ORDER — PRAVASTATIN SODIUM 10 MG/1
10 TABLET ORAL
Status: DISCONTINUED | OUTPATIENT
Start: 2017-01-01 | End: 2017-01-01

## 2017-01-01 RX ORDER — DABIGATRAN ETEXILATE 150 MG/1
150 CAPSULE ORAL EVERY 12 HOURS
Status: DISCONTINUED | OUTPATIENT
Start: 2017-01-01 | End: 2017-01-01 | Stop reason: HOSPADM

## 2017-01-01 RX ORDER — SODIUM CHLORIDE 0.9 % (FLUSH) 0.9 %
10 SYRINGE (ML) INJECTION
Status: COMPLETED | OUTPATIENT
Start: 2017-01-01 | End: 2017-01-01

## 2017-01-01 RX ADMIN — INSULIN LISPRO 2 UNITS: 100 INJECTION, SOLUTION INTRAVENOUS; SUBCUTANEOUS at 23:23

## 2017-01-01 RX ADMIN — POTASSIUM CHLORIDE 20 MEQ: 750 TABLET, FILM COATED, EXTENDED RELEASE ORAL at 22:10

## 2017-01-01 RX ADMIN — Medication 10 ML: at 21:09

## 2017-01-01 RX ADMIN — BENAZEPRIL HYDROCHLORIDE: 10 TABLET, FILM COATED ORAL at 12:13

## 2017-01-01 RX ADMIN — ASPIRIN 81 MG: 81 TABLET, COATED ORAL at 08:39

## 2017-01-01 RX ADMIN — INSULIN LISPRO 2 UNITS: 100 INJECTION, SOLUTION INTRAVENOUS; SUBCUTANEOUS at 08:39

## 2017-01-01 RX ADMIN — DABIGATRAN ETEXILATE MESYLATE 150 MG: 150 CAPSULE ORAL at 09:34

## 2017-01-01 RX ADMIN — DABIGATRAN ETEXILATE MESYLATE 150 MG: 150 CAPSULE ORAL at 08:45

## 2017-01-01 RX ADMIN — SODIUM CHLORIDE 75 ML/HR: 900 INJECTION, SOLUTION INTRAVENOUS at 18:04

## 2017-01-01 RX ADMIN — HALOPERIDOL LACTATE 1 MG: 5 INJECTION, SOLUTION INTRAMUSCULAR at 20:56

## 2017-01-01 RX ADMIN — Medication 10 ML: at 15:52

## 2017-01-01 RX ADMIN — INSULIN LISPRO 3 UNITS: 100 INJECTION, SOLUTION INTRAVENOUS; SUBCUTANEOUS at 16:47

## 2017-01-01 RX ADMIN — Medication 10 ML: at 22:13

## 2017-01-01 RX ADMIN — INSULIN GLARGINE 10 UNITS: 100 INJECTION, SOLUTION SUBCUTANEOUS at 08:38

## 2017-01-01 RX ADMIN — NITROGLYCERIN 1 INCH: 20 OINTMENT TOPICAL at 11:43

## 2017-01-01 RX ADMIN — PRAVASTATIN SODIUM 10 MG: 10 TABLET ORAL at 23:21

## 2017-01-01 RX ADMIN — SODIUM CHLORIDE 50 ML/HR: 900 INJECTION, SOLUTION INTRAVENOUS at 14:53

## 2017-01-01 RX ADMIN — DOXYCYCLINE 100 MG: 100 INJECTION, POWDER, LYOPHILIZED, FOR SOLUTION INTRAVENOUS at 11:35

## 2017-01-01 RX ADMIN — DABIGATRAN ETEXILATE MESYLATE 150 MG: 150 CAPSULE ORAL at 22:09

## 2017-01-01 RX ADMIN — CEFTRIAXONE 1 G: 1 INJECTION, POWDER, FOR SOLUTION INTRAMUSCULAR; INTRAVENOUS at 15:51

## 2017-01-01 RX ADMIN — IOPAMIDOL 100 ML: 755 INJECTION, SOLUTION INTRAVENOUS at 14:53

## 2017-01-01 RX ADMIN — INSULIN LISPRO 2 UNITS: 100 INJECTION, SOLUTION INTRAVENOUS; SUBCUTANEOUS at 12:06

## 2017-01-01 RX ADMIN — FLUCONAZOLE 200 MG: 200 TABLET ORAL at 08:39

## 2017-01-01 RX ADMIN — Medication 10 ML: at 06:50

## 2017-01-01 RX ADMIN — Medication 10 ML: at 23:21

## 2017-01-01 RX ADMIN — Medication 10 ML: at 03:49

## 2017-01-01 RX ADMIN — INSULIN LISPRO 2 UNITS: 100 INJECTION, SOLUTION INTRAVENOUS; SUBCUTANEOUS at 11:44

## 2017-01-01 RX ADMIN — Medication 10 ML: at 14:53

## 2017-01-01 RX ADMIN — ASPIRIN 81 MG: 81 TABLET, COATED ORAL at 12:06

## 2017-01-16 ENCOUNTER — OFFICE VISIT (OUTPATIENT)
Dept: DERMATOLOGY | Facility: AMBULATORY SURGERY CENTER | Age: 82
End: 2017-01-16

## 2017-01-16 VITALS
HEIGHT: 60 IN | RESPIRATION RATE: 16 BRPM | OXYGEN SATURATION: 98 % | DIASTOLIC BLOOD PRESSURE: 66 MMHG | TEMPERATURE: 98 F | BODY MASS INDEX: 26.31 KG/M2 | HEART RATE: 99 BPM | SYSTOLIC BLOOD PRESSURE: 114 MMHG | WEIGHT: 134 LBS

## 2017-01-16 DIAGNOSIS — C44.729 SQUAMOUS CELL CARCINOMA OF LOWER LEG, LEFT: Primary | ICD-10-CM

## 2017-01-16 RX ORDER — LIDOCAINE HYDROCHLORIDE AND EPINEPHRINE 10; 10 MG/ML; UG/ML
3 INJECTION, SOLUTION INFILTRATION; PERINEURAL ONCE
Qty: 3 ML | Refills: 0
Start: 2017-01-16 | End: 2017-01-16

## 2017-01-16 RX ORDER — BUPIVACAINE HYDROCHLORIDE AND EPINEPHRINE 2.5; 5 MG/ML; UG/ML
INJECTION, SOLUTION INFILTRATION; PERINEURAL
Qty: 1.5 ML | Refills: 0
Start: 2017-01-16 | End: 2017-01-01

## 2017-01-16 NOTE — MR AVS SNAPSHOT
Visit Information Date & Time Provider Department Dept. Phone Encounter #  
 1/16/2017 10:30 AM MD Jay Medinairajyothi [phone] Your Appointments 3/6/2017  1:15 PM  
ROUTINE CARE with Macy Owens MD  
[facility] Internal Medicine [facility]) Appt Note: 3 month f/u  
 [address] [facility] [address] [facility] [address]  
[phone]  
  
   
 [address] [facility] [address] Upcoming Health Maintenance Date Due Pneumococcal 65+ High/Highest Risk (2 of 2 - PPSV23) 1/1/2006 INFLUENZA AGE 9 TO ADULT 8/1/2016 EYE EXAM RETINAL OR DILATED Q1 8/6/2016 FOOT EXAM Q1 8/21/2016 MICROALBUMIN Q1 9/3/2016 HEMOGLOBIN A1C Q6M 2/5/2017 DTaP/Tdap/Td series (1 - Tdap) 9/1/2017* LIPID PANEL Q1 8/5/2017 GLAUCOMA SCREENING Q2Y 8/6/2017 MEDICARE YEARLY EXAM 9/23/2017 *Topic was postponed. The date shown is not the original due date. Allergies as of 1/16/2017  Review Complete On: 1/16/2017 By: Joshua Washington LPN Severity Noted Reaction Type Reactions Aleve [Naproxen Sodium] Medium 02/15/2011   Side Effect Other (comments) Caused stomach ulcer and thrush Eliquis [Apixaban]  08/06/2016    Other (comments)  
 dizziness Labetalol  05/09/2016    Other (comments)  
 dizzyness Metformin  08/21/2015    Diarrhea With both IR and ER Plavix [Clopidogrel]  08/06/2016    Other (comments) Pt. Dev Johnston it was not effective Current Immunizations  Reviewed on 5/8/2016 Name Date Influenza Vaccine 10/9/2013 Pneumococcal Vaccine (Unspecified Type) 1/1/2001 Td, Adsorbed PF 2/12/2015 12:17 PM  
 Zoster Vaccine, Live 10/9/2013 Not reviewed this visit Vitals BP Pulse Temp Resp Height(growth percentile) Weight(growth percentile) 114/66 99 98 °F (36.7 °C) (Oral) 16 5' (1.524 m) 134 lb (60.8 kg) SpO2 BMI OB Status Smoking Status 98% 26.17 kg/m2 Hysterectomy Never Smoker Vitals History BMI and BSA Data Body Mass Index Body Surface Area  
 26.17 kg/m 2 1.6 m 2 Preferred Pharmacy Pharmacy Name Phone [facility] [phone], [address] AT [place] [address] [phone] Your Updated Medication List  
  
   
This list is accurate as of: 1/16/17 10:57 AM.  Always use your most recent med list.  
  
  
  
  
 acetaminophen 650 mg CR tablet Commonly known as:  TYLENOL ARTHRITIS PAIN Take 1 Tab by mouth two (2) times daily as needed for Pain. amLODIPine-benazepril 10-40 mg per capsule Commonly known as:  LOTREL  
TAKE ONE CAPSULE BY MOUTH AT BEDTIME FOR HYPERTENSION  
  
 BD INSULIN PEN NEEDLE UF MINI 31 gauge x 3/16\" Ndle Generic drug:  Insulin Needles (Disposable) USE TO INJECT LANTUS EVERY DAY  
  
 calcium carbonate 200 mg calcium (500 mg) Chew Commonly known as:  TUMS Take 1 Tab by mouth two (2) times daily as needed. cefUROXime 250 mg tablet Commonly known as:  CEFTIN Take 1 Tab by mouth every fourty-eight (48) hours. fluorouracil 5 % chemo cream  
Commonly known as:  EFUDEX Apply a thin layer to area on the left cheek twice daily for 2 weeks then stop for 1 week then apply for 2 weeks longer. insulin glargine 100 unit/mL (3 mL) pen Commonly known as:  LANTUS SOLOSTAR  
30 Units by SubCUTAneous route daily. D/C glipizide LORazepam 0.5 mg tablet Commonly known as:  ATIVAN Take 1 Tab by mouth two (2) times a day. Max Daily Amount: 1 mg. mupirocin 2 % ointment Commonly known as:  [facility] Apply  to affected area daily. * OTHER Wheel chair Use every day * OTHER Please discontinue zoloft and lorazepam  
  
 PRADAXA 150 mg capsule Generic drug:  dabigatran etexilate TAKE ONE CAPSULE BY MOUTH EVERY 12 HOURS FOR CEREBROVASCULAR ACCIDENT  
  
 pravastatin 10 mg tablet Commonly known as:  PRAVACHOL  
TAKE 1 TABLET BY MOUTH NIGHTLY. DISCONTINUE LIPITOR. * Notice: This list has 2 medication(s) that are the same as other medications prescribed for you. Read the directions carefully, and ask your doctor or other care provider to review them with you. Patient Instructions WOUND CARE INSTRUCTIONS 1. Keep the dressing clean and dry and do not remove for 48 hours. 2. Then change the dressing once a day as follows: 
a. Wash hands before and after each dressing change. b. Remove dressing and wash site gently with mild soap and water, rinse, and pat dry. 
c. Apply an ointment (Bacitracin, Polysporin, Neosporin, Petroleum jelly or Aquaphor). d. Apply a non-stick (Telfa) dressing or Band-Aid to cover the wound. Remove pressure bandage Wednesday, then wash gently and apply Vaseline and a band-aid to site daily for 1 week. 3. Watch for: BLEEDING: A small amount of drainage may occur. If bleeding occurs, elevate and rest the surgery site. Apply gauze and steady pressure for 15 minutes. If bleeding continues, call this office. INFECTION: Signs of infection include increased redness, pain, warmth, drainage of pus, and fever. If this occurs, call this office. 4. Special Instructions (follow any that are checked): ·  You have stitches that need to be removed in 0 days ·  Avoid bending at the waist and heavy lifting for two days. ·  Sleep with your head elevated for the next two nights. ·  Rest the surgery site and keep it elevated as much as possible for two days. ·  You may apply an ice-pack for 10-15 minutes every waking hour for the rest of the day. ·  Eat a soft diet and avoid hot food and hot drinks for the rest of the day. ·  Other instructions: Follow up as needed Take Tylenol for pain as needed.  
 
 
Once the site is healed with no remaining bandages or open areas, protect your surgical site and scar from the sun, as this area will be more sensitive. Use a broad spectrum sunscreen SPF 30 or higher daily, and a chemical free product (one containing zinc oxide or titanium dioxide) is a good choice if the area is sensitive. You may begin to gently massage the surgical site in 2-3 weeks, rubbing in a circular motion along the scar. This can help reduce swelling and thickness of a scar. A scar cream may be used beginnning 1 month after the surgery. If you have any questions or concerns, please call our office Monday through Friday at [phone]. Introducing [facility]! Dear Maranda Sterling: 
Thank you for requesting a [employer] account. Our records indicate that you already have an active [employer] account. You can access your account anytime at [email or web address] Did you know that you can access your hospital and ER discharge instructions at any time in [employer]? You can also review all of your test results from your hospital stay or ER visit. Additional Information If you have questions, please visit the Frequently Asked Questions section of the [employer] website at [email or web address] Remember, [employer] is NOT to be used for urgent needs. For medical emergencies, dial 911. Now available from your iPhone and Android! Please provide this summary of care documentation to your next provider. Your primary care clinician is listed as Benedict Balderrama. If you have any questions after today's visit, please call [phone].

## 2017-01-16 NOTE — PROGRESS NOTES
This note is written by Manan Olivera, as dictated by Heber Gaston. Jeff Venegas MD.    CC: Squamous cell carcinoma on the left medial calf    History of present illness:     Daksha Quintero is a 80 y.o. female referred by El Arroyo DNP. 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 This is a new squamous cell carcinoma present since the summer described as a persistent scaly lesion with no prior treatment. Her daughter reports she delayed treatment due to strokes. 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 She is feeling well and in her usual state of health today. She has no pain, no current illnesses, no other skin concerns. Her allergies, medications, medical, and social history are reviewed by me today. She reports a history of significant sun exposure in the past with SCC of the upper back and SCC of the left hip. She and her daughter report she is healing well after the biopsy of SCC in situ by Adalberto Chase and believes her cheek is showing improvement post-treatment with 5 fluorouracil cream. She is accompanied by her daughter who is her POA. Exam:     She is an awake, alert, and oriented 80 y.o. female who appears well and in no distress. There is no left popliteal or inguinal lymphadenopathy. I examined her left lower leg. She has a 21 x 18 mm indistinct keratotic plaque with crust on her left medial calf. She confirms location. Her left lateral cheek has a very well-healed thin pink scar from use of 5-FU cream to treat a squamous cell carcinoma in situ, no evidence of residual lesion. Assessment/plan:    1. Squamous cell carcinoma, left medial calf. I discussed the diagnosis of squamous cell carcinoma and summarized the pathology report. Mohs surgery is indicated by site, size and poor definition. The procedure was discussed, verbal and written consent were obtained. I performed the procedure.  One stage was required to reach a tumor free plane. The surgical defect was managed with second intent healing, estimating approx 4 weeks to heal the shallow defect. There were no complications. She will follow up as needed as the site heals. Indications, risks, and options were discussed with Neto Perry preoperatively. Risks including, but not limited to: pain, bleeding, infection, tumor recurrence, scarring and damage to motor and/or sensory nerves, were discussed. Neto Perry chose Mohs surgery. Neto Perry was an acceptable surgery candidate. Neto Perry was placed in the appropriate position on the operating table in the Mohs surgery procedure room. The area was prepped and draped in the standard manner. Gentian violet was used to outline the clinical margins of the tumor. Local anesthesia was then obtained. The grossly visible tumor was then removed, an underlying layer was excised and mapped according to the Mohs technique, and the individual specimens examined microscopically. The process was repeated until microscopic examination of the tissue specimens confirmed a tumor-free plane. Hemostasis was obtained with electrosurgery and pressure. The wound was covered between stages with moist saline gauze. Wound care instructions (written and verbal) and a follow up appointment were given to Neto Perry before discharge. Neto Perry was discharged in good condition. 2. History of nonmelanoma skin cancers. I discussed the diagnosis and recommend routine examinations with Paddy Cabezas DNP, for surveillance. She should return in April for her first follow up visit. The documentation recorded by the scribe accurately reflects the service I personally performed and the decisions made by me. [facility]   OFFICE PROCEDURE PROGRESS NOTE     Chart reviewed for the following:     Ane Felicia Dia MD, have reviewed the History, Physical and updated the Allergic reactions for [address] performed immediately prior to start of procedure:     Dennis Cruz MD, have performed the following reviews on Reyna Bautista prior to the start of the procedure:     * Patient was identified by name and date of birth   * Agreement on procedure being performed was verified   * Risks and Benefits explained to the patient   * Procedure site verified and marked as necessary   * Patient was positioned for comfort   * Consent was signed and verified     Time: 10:40 AM  Date of procedure: 1/16/2017  Procedure performed by: Violetta Chávez.  Tamia Cruz MD   Provider assisted by: LPN   Patient assisted by: self   How tolerated by patient: tolerated the procedure well with no complications   Comments: none

## 2017-01-16 NOTE — PROGRESS NOTES
Pre-op: Patient present today for the evaluation of squamous cell carcinoma to the left medial calf. Procedure explained with full understanding. Vitals:    01/16/17 1025   BP: 114/66   Pulse: 99   Resp: 16   Temp: 98 °F (36.7 °C)   TempSrc: Oral   SpO2: 98%   Weight: 60.8 kg (134 lb)   Height: 5' (1.524 m)     preoperatively, will continue to monitor. Post-op: Written and verbal post-op wound care instructions given to patient with full understanding of care. Surgical wound bandaged with Vaseline, Telfa,  2x2 gauze, and coverall tape. All questions and concerns addressed. Vitals stable postoperatively.

## 2017-01-16 NOTE — PATIENT INSTRUCTIONS
WOUND CARE INSTRUCTIONS    1. Keep the dressing clean and dry and do not remove for 48 hours. 2. Then change the dressing once a day as follows:  a. Wash hands before and after each dressing change. b. Remove dressing and wash site gently with mild soap and water, rinse, and pat dry.  c. Apply an ointment (Bacitracin, Polysporin, Neosporin, Petroleum jelly or Aquaphor). d. Apply a non-stick (Telfa) dressing or Band-Aid to cover the wound. Remove pressure bandage Wednesday, then wash gently and apply Vaseline and a band-aid to site daily for 1 week. 3. Watch for:  BLEEDING: A small amount of drainage may occur. If bleeding occurs, elevate and rest the surgery site. Apply gauze and steady pressure for 15 minutes. If bleeding continues, call this office. INFECTION: Signs of infection include increased redness, pain, warmth, drainage of pus, and fever. If this occurs, call this office. 4. Special Instructions (follow any that are checked):  · [] You have stitches that need to be removed in 0 days  · [] Avoid bending at the waist and heavy lifting for two days. · [] Sleep with your head elevated for the next two nights. · [x] Rest the surgery site and keep it elevated as much as possible for two days. · [x] You may apply an ice-pack for 10-15 minutes every waking hour for the rest of the day. · [] Eat a soft diet and avoid hot food and hot drinks for the rest of the day. · [] Other instructions: Follow up as needed  Take Tylenol for pain as needed. Once the site is healed with no remaining bandages or open areas, protect your surgical site and scar from the sun, as this area will be more sensitive. Use a broad spectrum sunscreen SPF 30 or higher daily, and a chemical free product (one containing zinc oxide or titanium dioxide) is a good choice if the area is sensitive. You may begin to gently massage the surgical site in 2-3 weeks, rubbing in a circular motion along the scar.  This can help reduce swelling and thickness of a scar. A scar cream may be used beginnning 1 month after the surgery. If you have any questions or concerns, please call our office Monday through Friday at [phone].

## 2017-01-31 ENCOUNTER — TELEPHONE (OUTPATIENT)
Dept: INTERNAL MEDICINE CLINIC | Age: 82
End: 2017-01-31

## 2017-01-31 NOTE — TELEPHONE ENCOUNTER
Pt.'s  daughter Muna Mann would like to talk to a nurse about some things going on with jasiel .  [phone]    Blood sugar was 181 this morning before any thing was taking or eaten  Blood pres and heart rate were normal.

## 2017-02-02 NOTE — TELEPHONE ENCOUNTER
Writer placed call to deepak ,Mayelin Zapien, and she stated that [facility] told her that they would call this office that pt has had episodes of extreme lethergy. She states as far as she is aware VS are all stable but stated that the therapists have more details. Writer encouraged deepak to have [facility] call office.  deepak voiced understanding

## 2017-02-06 DIAGNOSIS — I63.9 CEREBROVASCULAR ACCIDENT (CVA), UNSPECIFIED MECHANISM (HCC): Primary | ICD-10-CM

## 2017-02-06 DIAGNOSIS — I63.19 CEREBROVASCULAR ACCIDENT (CVA) DUE TO EMBOLISM OF OTHER PRECEREBRAL ARTERY (HCC): ICD-10-CM

## 2017-02-16 NOTE — PROGRESS NOTES
Requested Prescriptions     Signed Prescriptions Disp Refills    fluconazole (DIFLUCAN) 150 mg tablet 1 Tab 0     Sig: Take 1 Tab by mouth daily for 1 day. FDA advises cautious prescribing of oral fluconazole in pregnancy. Alexia Huffman, verbal order received.

## 2017-03-06 NOTE — PROGRESS NOTES
Health Maintenance Due   Topic Date Due    Pneumococcal 65+ High/Highest Risk (2 of 2 - PPSV23) 01/01/2006    INFLUENZA AGE 9 TO ADULT  08/01/2016    EYE EXAM RETINAL OR DILATED Q1  08/06/2016    FOOT EXAM Q1  08/21/2016    MICROALBUMIN Q1  09/03/2016    HEMOGLOBIN A1C Q6M  02/05/2017       Chief Complaint   Patient presents with    Follow-up     3 month    Hypertension    Diabetes    Cerebrovascular Accident    Dysuria     incontinent of B&B       1. Have you been to the ER, urgent care clinic since your last visit? Hospitalized since your last visit? No    2. Have you seen or consulted any other health care providers outside of the [address] [place] [address] since your last visit? Include any pap smears or colon screening. No    3) Do you have an Advance Directive on file? no    4) Are you interested in receiving information on Advance Directives? NO      Patient is accompanied by daughter I have received verbal consent from Sergey Mallory to discuss any/all medical information while they are present in the room.

## 2017-03-06 NOTE — PROGRESS NOTES
HISTORY OF PRESENT ILLNESS  Venda Gosselin is a 80 y.o. female here accompanied by her daughter. tristongn well. reports urine incontinence and recurrent UTI. Abx causes her to have diarrhea. She is on diaper. anxious about seeing urologist since she had to put on catheter which she hates. adv her to have ativan before procedure. She will think about it. Has HTN. on meds. doing well. She has diabetes. on insulin,last A1C was 7.1. Statin was changed to pravastatin,no myalgia. Anxiety was treated with ativan,stable. Has Sq cell ca in skin,was treated. Lost 9 pound weight.eating well as per daughter. need lab work,. Follow-up     Hypertension      Diabetes     Dysuria     Neurologic Problem   Primary symptoms include focal weakness. Extremity Weakness   Primary symptoms include focal weakness. Review of Systems   Constitutional: Negative. HENT: Negative. Eyes: Negative. Respiratory: Negative. Cardiovascular: Negative. Gastrointestinal: Negative. Genitourinary: Positive for difficulty urinating, dysuria and frequency. Musculoskeletal: Negative. Skin: Negative. Neurological: Positive for focal weakness. Psychiatric/Behavioral: The patient is nervous/anxious. Physical Exam   Constitutional: She appears well-developed and well-nourished. No distress. Neck: Normal range of motion. Neck supple. No JVD present. No thyromegaly present. Cardiovascular: Normal rate, regular rhythm, normal heart sounds and intact distal pulses. Pulmonary/Chest: Effort normal and breath sounds normal. No respiratory distress. She has no wheezes. Abdominal: Soft. Bowel sounds are normal. She exhibits no distension. There is no tenderness. KUB NT   Neurological: She displays abnormal reflex. She exhibits abnormal muscle tone. Coordination abnormal.   Psychiatric: She has a normal mood and affect.  Her behavior is normal.       ASSESSMENT and PLAN  Sintia Epperson was seen today for follow-up, hypertension, diabetes, cerebrovascular accident and dysuria. Diagnoses and all orders for this visit:    Urinary incontinence, unspecified type  Need to have botox injection if applicable. She has recurrent UTI and she is incontinent. Will refer,  -     REFERRAL TO UROLOGY  -     CBC WITH AUTOMATED DIFF    Type 2 diabetes mellitus without complication, with long-term current use of insulin (HCC)    On lantus. Will do,  -     METABOLIC PANEL, COMPREHENSIVE  -     HEMOGLOBIN A1C WITH EAG    Cerebrovascular accident (CVA) due to embolism of other precerebral artery (HCC)    On pradaxa and pravachol. Essential hypertension, benign    On lotrel. stable. Pure hypercholesterolemia    On pravachol. Will do,  -     LIPID PANEL    Recurrent UTI  -     REFERRAL TO UROLOGY    Depression with anxiety  -     LORazepam (ATIVAN) 0.5 mg tablet; Take 1 Tab by mouth two (2) times a day. Max Daily Amount: 1 mg. Weight loss    Lost 9 pound weight. Will add,  -     food supplemt, lactose-reduced (ENSURE) liqd; Take 237 mL by mouth every fourty-eight (48) hours. Discussed expected course/resolution/complications of diagnosis in detail with patient. Medication risks/benefits/costs/interactions/alternatives discussed with patient. Pt was given an after visit summary which includes diagnoses, current medications & vitals. Pt expressed understanding with the diagnosis and plan.

## 2017-03-06 NOTE — MR AVS SNAPSHOT
Visit Information Date & Time Provider Department Dept. Phone Encounter #  
 3/6/2017  1:15 PM Norma Barkley MD [facility] [id number] Your Appointments 4/24/2017 11:00 AM  
Office Visit with GUILLERMO Arguelles 8[address] [facility]) Appt Note: est pt; Skin exam & check faviola w/Dr P also [facility] [place] [address] [facility] [address]  
[address] [place] [address] [place]  [address] [facility] [address] Upcoming Health Maintenance Date Due Pneumococcal 65+ High/Highest Risk (2 of 2 - PPSV23) 1/1/2006 INFLUENZA AGE 9 TO ADULT 8/1/2016 EYE EXAM RETINAL OR DILATED Q1 8/6/2016 FOOT EXAM Q1 8/21/2016 MICROALBUMIN Q1 9/3/2016 HEMOGLOBIN A1C Q6M 2/5/2017 DTaP/Tdap/Td series (1 - Tdap) 9/1/2017* LIPID PANEL Q1 8/5/2017 GLAUCOMA SCREENING Q2Y 8/6/2017 MEDICARE YEARLY EXAM 9/23/2017 *Topic was postponed. The date shown is not the original due date. Allergies as of 3/6/2017  Review Complete On: 3/6/2017 By: Norma Barkley MD  
  
 Severity Noted Reaction Type Reactions Aleve [Naproxen Sodium] Medium 02/15/2011   Side Effect Other (comments) Caused stomach ulcer and thrush Eliquis [Apixaban]  08/06/2016    Other (comments)  
 dizziness Labetalol  05/09/2016    Other (comments)  
 dizzyness Metformin  08/21/2015    Diarrhea With both IR and ER Plavix [Clopidogrel]  08/06/2016    Other (comments) Pt. Talha Hawkins it was not effective Current Immunizations  Reviewed on 5/8/2016 Name Date Influenza Vaccine 10/9/2013 Pneumococcal Vaccine (Unspecified Type) 1/1/2001 Td, Adsorbed PF 2/12/2015 12:17 PM  
 Zoster Vaccine, Live 10/9/2013 Not reviewed this visit You Were Diagnosed With   
  
 Codes Comments  Urinary incontinence, unspecified type    -  Primary ICD-10-CM: R32 
ICD-9-CM: 788.30   
 Type 2 diabetes mellitus without complication, with long-term current use of insulin (HCC)     ICD-10-CM: E11.9, Z79.4 ICD-9-CM: 250.00, V58.67 Cerebrovascular accident (CVA) due to embolism of other precerebral artery ([facility] [address]     ICD-10-CM: S09.56 Essential hypertension, benign     ICD-10-CM: I10 
ICD-9-CM: 401.1 Pure hypercholesterolemia     ICD-10-CM: E78.00 ICD-9-CM: 272.0 Recurrent UTI     ICD-10-CM: N39.0 ICD-9-CM: 599.0 Depression with anxiety     ICD-10-CM: F41.8 ICD-9-CM: 300.4 Weight loss     ICD-10-CM: R63.4 ICD-9-CM: 783.21 Vitals BP Pulse Temp Resp Height(growth percentile) Weight(growth percentile) 139/81 (BP 1 Location: Right arm, BP Patient Position: Sitting) 75 96.9 °F (36.1 °C) (Oral) 20 5' (1.524 m) 125 lb (56.7 kg) SpO2 BMI OB Status Smoking Status 96% 24.41 kg/m2 Hysterectomy Never Smoker Vitals History BMI and BSA Data Body Mass Index Body Surface Area  
 24.41 kg/m 2 1.55 m 2 Preferred Pharmacy Pharmacy Name Phone [place] [phone] - [address] , [address] [place] [address] [phone] Your Updated Medication List  
  
   
This list is accurate as of: 3/6/17  2:03 PM.  Always use your most recent med list.  
  
  
  
  
 acetaminophen 650 mg CR tablet Commonly known as:  TYLENOL ARTHRITIS PAIN Take 1 Tab by mouth two (2) times daily as needed for Pain. amLODIPine-benazepril 10-40 mg per capsule Commonly known as:  LOTREL  
TAKE ONE CAPSULE BY MOUTH AT BEDTIME FOR HYPERTENSION  
  
 BD INSULIN PEN NEEDLE UF MINI 31 gauge x 3/16\" Ndle Generic drug:  Insulin Needles (Disposable) USE TO INJECT LANTUS EVERY DAY  
  
 bupivacaine-EPINEPHrine 0.25 %-1:200,000 Soln Commonly known as:  Dub Goyal Used for mohs surgery  Indications: LOCAL ANESTHESIA FOR PROCEDURES  
  
 calcium carbonate 200 mg calcium (500 mg) Chew Commonly known as:  TUMS Take 1 Tab by mouth two (2) times daily as needed. fluorouracil 5 % chemo cream  
Commonly known as:  EFUDEX Apply a thin layer to area on the left cheek twice daily for 2 weeks then stop for 1 week then apply for 2 weeks longer. food supplemt, lactose-reduced Liqd Commonly known as:  ENSURE Take 237 mL by mouth every fourty-eight (48) hours. insulin glargine 100 unit/mL (3 mL) pen Commonly known as:  LANTUS SOLOSTAR  
30 Units by SubCUTAneous route daily. D/C glipizide LORazepam 0.5 mg tablet Commonly known as:  ATIVAN Take 1 Tab by mouth two (2) times a day. Max Daily Amount: 1 mg. mupirocin 2 % ointment Commonly known as:  [facility] Apply  to affected area daily. * OTHER Wheel chair Use every day * OTHER Please discontinue zoloft and lorazepam  
  
 * OTHER Half rails on bed PRADAXA 150 mg capsule Generic drug:  dabigatran etexilate TAKE ONE CAPSULE BY MOUTH EVERY 12 HOURS FOR CEREBROVASCULAR ACCIDENT  
  
 pravastatin 10 mg tablet Commonly known as:  PRAVACHOL  
TAKE 1 TABLET BY MOUTH NIGHTLY. DISCONTINUE LIPITOR. [address] [facility] For pt use * Notice: This list has 3 medication(s) that are the same as other medications prescribed for you. Read the directions carefully, and ask your doctor or other care provider to review them with you. Prescriptions Printed Refills LORazepam (ATIVAN) 0.5 mg tablet 0 Sig: Take 1 Tab by mouth two (2) times a day. Max Daily Amount: 1 mg. Class: Print Route: Oral  
 food supplemt, lactose-reduced (ENSURE) liqd 2 Sig: Take 237 mL by mouth every fourty-eight (48) hours. Class: Print Route: Oral  
  
We Performed the Following CBC WITH AUTOMATED DIFF [99748 CPT(R)] HEMOGLOBIN A1C WITH EAG [91556 CPT(R)] LIPID PANEL [15011 CPT(R)] METABOLIC PANEL, COMPREHENSIVE [82903 CPT(R)] REFERRAL TO UROLOGY [GWC539 Custom] Comments:  
 Please evaluate patient for urine incontinence and recurrent UTI Referral Information Referral ID Referred By Referred To  
  
 [id number] [facility] [address]   
   [place], [address] Visits Status Start Date End Date 1 New Request 3/6/17 3/6/18 If your referral has a status of pending review or denied, additional information will be sent to support the outcome of this decision. Introducing [facility] & HEALTH SERVICES! Dear Fady Hernandez: 
Thank you for requesting a [employer] account. Our records indicate that you already have an active [employer] account. You can access your account anytime at [email or web address] Did you know that you can access your hospital and ER discharge instructions at any time in [employer]? You can also review all of your test results from your hospital stay or ER visit. Additional Information If you have questions, please visit the Frequently Asked Questions section of the [employer] website at [email or web address] Remember, [employer] is NOT to be used for urgent needs. For medical emergencies, dial 911. Now available from your iPhone and Android! Please provide this summary of care documentation to your next provider. Your primary care clinician is listed as Aleksey Milian. If you have any questions after today's visit, please call [phone].

## 2017-03-09 NOTE — PROGRESS NOTES
Diabetes is worse. watch ADA diet. will increase lantus to 32 unit every day. all other labs are stable.

## 2017-03-13 NOTE — TELEPHONE ENCOUNTER
Patient sister called was told by other sister that Kristina Guzman want to change patient lantus  need a hard prescription she also is calling a paperwork [phone]

## 2017-04-24 NOTE — MR AVS SNAPSHOT
Visit Information Date & Time Provider Department Dept. Phone Encounter #  
 4/24/2017 11:00 AM Kayleigh Marcus NP [place] [address] [phone] [id number] Your Appointments 7/3/2017  1:15 PM  
ROUTINE CARE with Clement Ashley MD  
[facility]) Appt Note: 4 month follow up [address] [place] [address] [address] [facility] [address]  
[phone]  
  
   
 [address] [facility] Upcoming Health Maintenance Date Due Pneumococcal 65+ High/Highest Risk (2 of 2 - PPSV23) 1/1/2006 INFLUENZA AGE 9 TO ADULT 8/1/2016 EYE EXAM RETINAL OR DILATED Q1 8/6/2016 FOOT EXAM Q1 8/21/2016 MICROALBUMIN Q1 9/3/2016 DTaP/Tdap/Td series (1 - Tdap) 9/1/2017* GLAUCOMA SCREENING Q2Y 8/6/2017 HEMOGLOBIN A1C Q6M 9/6/2017 MEDICARE YEARLY EXAM 9/23/2017 LIPID PANEL Q1 3/6/2018 *Topic was postponed. The date shown is not the original due date. Allergies as of 4/24/2017  Review Complete On: 4/24/2017 By: Alexandre Alberts LPN Severity Noted Reaction Type Reactions Aleve [Naproxen Sodium] Medium 02/15/2011   Side Effect Other (comments) Caused stomach ulcer and thrush Eliquis [Apixaban]  08/06/2016    Other (comments)  
 dizziness Labetalol  05/09/2016    Other (comments)  
 dizzyness Metformin  08/21/2015    Diarrhea With both IR and ER Plavix [Clopidogrel]  08/06/2016    Other (comments) Pt. Bonifacio Dao it was not effective Current Immunizations  Reviewed on 5/8/2016 Name Date Influenza Vaccine 10/9/2013 Pneumococcal Vaccine (Unspecified Type) 1/1/2001 Td, Adsorbed PF 2/12/2015 12:17 PM  
 Zoster Vaccine, Live 10/9/2013 Not reviewed this visit Vitals BP Pulse Temp Resp Height(growth percentile) Weight(growth percentile)  126/62 (BP 1 Location: Right arm, BP Patient Position: Sitting) 74 98 °F (36.7 °C) (Oral) 16 5' (1.524 m) 125 lb (56.7 kg) SpO2 BMI OB Status Smoking Status 98% 24.41 kg/m2 Hysterectomy Never Smoker Vitals History BMI and BSA Data Body Mass Index Body Surface Area  
 24.41 kg/m 2 1.55 m 2 Preferred Pharmacy Pharmacy Name Phone [place] [phone] - [address], [address] [place] [address] [phone] Your Updated Medication List  
  
   
This list is accurate as of: 4/24/17 11:04 AM.  Always use your most recent med list.  
  
  
  
  
 acetaminophen 650 mg CR tablet Commonly known as:  TYLENOL ARTHRITIS PAIN Take 1 Tab by mouth two (2) times daily as needed for Pain. * amLODIPine-benazepril 10-40 mg per capsule Commonly known as:  LOTREL  
TAKE ONE CAPSULE BY MOUTH AT BEDTIME FOR HYPERTENSION  
  
 * amLODIPine-benazepril 5-40 mg per capsule Commonly known as:  LOTREL  
TAKE 1 CAPSULE BY MOUTH DAILY  
  
 BD INSULIN PEN NEEDLE UF MINI 31 gauge x 3/16\" Ndle Generic drug:  Insulin Needles (Disposable) USE TO INJECT LANTUS EVERY DAY  
  
 bupivacaine-EPINEPHrine 0.25 %-1:200,000 Soln Commonly known as:  Lena Samuel Used for mohs surgery  Indications: LOCAL ANESTHESIA FOR PROCEDURES  
  
 calcium carbonate 200 mg calcium (500 mg) Chew Commonly known as:  TUMS Take 1 Tab by mouth two (2) times daily as needed. fluorouracil 5 % chemo cream  
Commonly known as:  EFUDEX Apply a thin layer to area on the left cheek twice daily for 2 weeks then stop for 1 week then apply for 2 weeks longer. food supplemt, lactose-reduced Liqd Commonly known as:  ENSURE Take 237 mL by mouth every fourty-eight (48) hours. * insulin glargine 100 unit/mL (3 mL) pen Commonly known as:  LANTUS SOLOSTAR  
30 Units by SubCUTAneous route daily. D/C glipizide * insulin glargine 100 unit/mL injection Commonly known as:  LANTUS  
32 units SQ  
  
 LORazepam 0.5 mg tablet Commonly known as:  ATIVAN Take 1 Tab by mouth two (2) times a day. Max Daily Amount: 1 mg. mupirocin 2 % ointment Commonly known as:  [facility] [facility] Apply  to affected area daily. * OTHER Wheel chair Use every day * OTHER Please discontinue zoloft and lorazepam  
  
 * OTHER Half rails on bed PRADAXA 150 mg capsule Generic drug:  dabigatran etexilate TAKE ONE CAPSULE BY MOUTH EVERY 12 HOURS FOR CEREBROVASCULAR ACCIDENT  
  
 pravastatin 10 mg tablet Commonly known as:  PRAVACHOL  
TAKE 1 TABLET BY MOUTH NIGHTLY. DISCONTINUE LIPITOR. [address] [facility] For pt use * Notice: This list has 7 medication(s) that are the same as other medications prescribed for you. Read the directions carefully, and ask your doctor or other care provider to review them with you. Patient Instructions Self Skin Exam and Sunscreens Early detection and treatment is essential in the treatment of all forms of skin cancer. If caught early, all forms of skin cancer are curable. In addition to your regular visits, you should perform a monthly skin examination. Over time, you become familiar with what is normally found on your skin and can identify new or suspicious spots. One of the screening tools you can use to assess your skin is to follow the ABCDEs: 
 
A= Asymmetry (One half is unlike the other half) B= Border (An irregular, scalloped or poorly defined edge) C= Color (Is varied from one area to another, has shades of tan, brown/ black,       white, red or blue) D= Diameter (Spots larger than 6mm or a pencil eraser) E= Evolving (New spots or one that is changing in size, shape, or color) A follow- up interval will be customized based on your history of skin cancer or level of skin damage and risk factors. In any case, if you notice a suspicious or new spot, an appointment should be arranged between regular visits. Everyone should use sunscreen and sun-safe practices, which is especially important for those with a personal or family history of skin cancer. Suggestions for this include: 1. Use daily moisturizers containing SPF 30 or higher. 2. Wear long sleeve clothing with UPF ratings and a broad-brimmed hat. 3. Apply sunscreen with SPF 30 or higher to all sun exposed areas if you are going to be in the sun. A broad spectrum UVA/ UVB sunscreen is best.  Dont forget to REAPPLY every two hours or more often if swimming or sweating! 4. Avoid outside activities during peak sun hours, especially in the summer (10am- 2pm). 5. DO NOT use tanning beds. Using sunscreen and sun-safe practices can help reduce the likelihood of developing skin cancer or additional skin cancers in those previously diagnosed. Introducing [facility] & HEALTH SERVICES! Dear Vicente Huitron: 
Thank you for requesting a [employer] account. Our records indicate that you already have an active [employer] account. You can access your account anytime at [email or web address] Did you know that you can access your hospital and ER discharge instructions at any time in [employer]? You can also review all of your test results from your hospital stay or ER visit. Additional Information If you have questions, please visit the Frequently Asked Questions section of the [employer] website at [email or web address] Remember, [employer] is NOT to be used for urgent needs. For medical emergencies, dial 911. Now available from your iPhone and Android! Please provide this summary of care documentation to your next provider. Your primary care clinician is listed as Thor Spring. If you have any questions after today's visit, please call [phone].

## 2017-04-24 NOTE — PROGRESS NOTES
Name: Rafa Clancy       Age: 80 y.o. Date: 4/24/2017    Chief Complaint:   Chief Complaint   Patient presents with    Skin Exam     follow up of mohs        Subjective:    HPI  Ms. Rafa Clancy is a 80 y.o. female who presents for a skin exam.  The patient's last visit was for Mohs surgery on the left medial shin. The patient is present with her daughter. They both state the surgical site has healed well. The patient does have current complaints related to her skin. She reports \"pimples\" on the right neck, right cheek and nose. The patient's daughter has noted skin change in the area treated with 5-Fu in the past on the left cheek. Ms. Rafa Clancy is feeling well and in her usual state of health today. 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 Her daughter helps her observe her skin. The patient states her brace on the left leg is also not fitting well - seems loose in the AM. We discussed this is likely due to swelling that decreases overnight to the best of my knowledge. ROS: Constitutional: Negative. Dermatological : positive for - skin lesion changes      Social History     Social History    Marital status:      Spouse name: N/A    Number of children: 4    Years of education: 15     Occupational History    Not on file.      Social History Main Topics    Smoking status: Never Smoker    Smokeless tobacco: Never Used    Alcohol use 0.0 oz/week     0 Standard drinks or equivalent per week      Comment: once a year    Drug use: No    Sexual activity: Not Currently     Other Topics Concern     Service No    Blood Transfusions No    Caffeine Concern No    Occupational Exposure No    Hobby Hazards No    Sleep Concern Yes    Stress Concern Yes    Weight Concern No    Special Diet Yes    Back Care Yes    Exercise Yes    Bike Helmet No    Seat Belt Yes    Self-Exams No     Social History Narrative    Lives in [facility] alone.  since 2008. Had one daughter who was murdered in March 2012. Has 2 living daughters and 1 living son. Worked for the [employer] in [place]. Likes [employer]. Moved from [place] in 2008. 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    CAD (coronary artery disease)     Chronic UTI     Congestive heart failure, unspecified     Depression     Hypercholesterolemia     Hypertension     Hypokalemia     Insomnia     Macular degeneration     Moderate mitral regurgitation     Murmur     NIDDM     daily medicatons for diabetes    Skin cancer     Stroke ([facility] [address] 2131,[id number],    4 strokes and 1 TIA    Sun-damaged skin     Sunburn, blistering     Unspecified vitamin D deficiency        Past Surgical History:   Procedure Laterality Date    HX CORONARY ARTERY BYPASS GRAFT  12/9/09    3v    HX CORONARY ARTERY BYPASS GRAFT      3 vessel    HX HAMMER TOE REPAIR      HX HYSTERECTOMY      at age 29    HX MOHS PROCEDURES Left 01/16/2017    SCC left medial calf by Dr. Griselda Newton      bladder repair    IMPLANT  LOOP RECORDER  7/15/2016         ORAL SURGERY PROCEDURE      implants       Current Outpatient Prescriptions   Medication Sig Dispense Refill    food supplemt, lactose-reduced (ENSURE) liqd Take 237 mL by mouth every fourty-eight (48) hours. 15 Can 2    OTHER Half rails on bed 1 Each 0    Wheel Chair chen For pt use 1 Each 0    pravastatin (PRAVACHOL) 10 mg tablet TAKE 1 TABLET BY MOUTH NIGHTLY. DISCONTINUE LIPITOR.  90 Tab 5    amLODIPine-benazepril (LOTREL) 10-40 mg per capsule TAKE ONE CAPSULE BY MOUTH AT BEDTIME FOR HYPERTENSION 30 Cap 5    PRADAXA 150 mg capsule TAKE ONE CAPSULE BY MOUTH EVERY 12 HOURS FOR CEREBROVASCULAR ACCIDENT 60 Cap 5    insulin glargine (LANTUS SOLOSTAR) 100 unit/mL (3 mL) pen 30 Units by SubCUTAneous route daily. D/C glipizide 2 Each 4    acetaminophen (TYLENOL ARTHRITIS PAIN) 650 mg CR tablet Take 1 Tab by mouth two (2) times daily as needed for Pain. 30 Tab 5    OTHER Wheel chair  Use every day 1 Each 0    BD INSULIN PEN NEEDLE UF MINI 31 gauge x 3/16\" ndle USE TO INJECT LANTUS EVERY  Pen Needle 3    amLODIPine-benazepril (LOTREL) 5-40 mg per capsule TAKE 1 CAPSULE BY MOUTH DAILY 90 Cap 1    insulin glargine (LANTUS) 100 unit/mL injection 32 units SQ 1 Vial 11    LORazepam (ATIVAN) 0.5 mg tablet Take 1 Tab by mouth two (2) times a day. Max Daily Amount: 1 mg. 14 Tab 0    bupivacaine-EPINEPHrine (MARCAINE-EPINEPHRINE) 0.25 %-1:200,000 soln Used for mohs surgery  Indications: LOCAL ANESTHESIA FOR PROCEDURES 1.5 mL 0    mupirocin (BACTROBAN) 2 % ointment Apply  to affected area daily. 22 g 0    fluorouracil (EFUDEX) 5 % chemo cream Apply a thin layer to area on the left cheek twice daily for 2 weeks then stop for 1 week then apply for 2 weeks longer. 40 g 0    OTHER Please discontinue zoloft and lorazepam 1 Act 0    calcium carbonate (TUMS) 200 mg calcium (500 mg) chew Take 1 Tab by mouth two (2) times daily as needed. 60 Tab 3       Allergies   Allergen Reactions    Aleve [Naproxen Sodium] Other (comments)     Caused stomach ulcer and thrush    Eliquis [Apixaban] Other (comments)     dizziness    Labetalol Other (comments)     dizzyness    Metformin Diarrhea     With both IR and ER     Plavix [Clopidogrel] Other (comments)     Pt.  Said it was not effective         Objective:    Visit Vitals    /62 (BP 1 Location: Right arm, BP Patient Position: Sitting)    Pulse 74    Temp 98 °F (36.7 °C) (Oral)    Resp 16    Ht 5' (1.524 m)    Wt 56.7 kg (125 lb)    SpO2 98%    BMI 24.41 kg/m2       Robert Mason is a 80 y.o. female who appears well and in no distress. She is awake, alert, and oriented. There is no preauricular, submandibular, or cervical lymphadenopathy. A skin examination was performed including her ears, face, left and right lower legs from knees to ankles, and anterior neck. There are scaly patches of pink skin on the right anterior neck and left lateral cheek. Likely consistent with SCCi vs thickened AK. There is a pink scaly area on the left upper cutaneous lip consistent with AK. There are a few inflammatory papules on the nose - likely consistent with Demodex. There is a well healed scar on the left medial lower leg without evidence of lesion recurrence. There are scattered waxy macules and a few keratotic papules consistent with seborrheic keratoses. She has edema in the LE, left greater than right. There is a brace on the left hand. Assessment/Plan:  1. Personal history of skin cancer. I discussed sun protection, sunscreen use, the warning signs of skin cancer, the need for self-skin examinations, and the need for regular practitioner exams every 6 months. The patient should follow up sooner as needed if new, changing, or symptomatic skin lesions arise. 2.Actinic Keratoses. The diagnosis of this precancerous lesion related to sun exposure was reviewed. Verbal consent was obtained. I treated 3 lesions with cryotherapy and post-cryotherapy care was reviewed. 3. Seborrheic keratoses. The diagnosis was reviewed and the patient was reassured that no treatment is needed for these benign lesions. 4. Demodex acne/ rosacea. I suggest they try Selsun Blue shampoo. [facility]   OFFICE PROCEDURE PROGRESS NOTE   Chart reviewed for the following:   I, Osvaldo Gleason, have reviewed the History, Physical and updated the Allergic reactions for Sana Cook Jose Ramos. TIME OUT performed immediately prior to start of procedure:   Judy BECKER, have performed the following reviews on Jayla Garcia   prior to the start of the procedure:     * Patient was identified by name and date of birth   * Agreement on procedure being performed was verified   * Risks and Benefits explained to the patient   * Procedure site verified and marked as necessary   * Patient was positioned for comfort   * Verbal consent was obtained    Time: 1120  Date of procedure: 4/24/2017  Procedure performed by: Melina Jean.  Isra Lopez DNP  Provider assisted by: none   Patient assisted by: self   How tolerated by patient: tolerated the procedure well with no complications   Comments: none

## 2017-05-11 NOTE — UC PROVIDER NOTE
Patient is a 80 y.o. female presenting with urinary pain. The history is provided by the patient and a relative. Urinary Pain    This is a recurrent problem. The current episode started yesterday. The problem occurs every urination. The quality of the pain is described as burning. There has been no fever. She is not sexually active. Associated symptoms include urgency. Pertinent negatives include no chills. 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    third-way heart bypass    CAD (coronary artery disease)     Chronic UTI     Congestive heart failure, unspecified     Depression     Hypercholesterolemia     Hypertension     Hypokalemia     Insomnia     Macular degeneration     Moderate mitral regurgitation     Murmur     NIDDM     daily medicatons for diabetes    Skin cancer     Stroke ([facility] [address] [phone],    4 strokes and 1 TIA    Sun-damaged skin     Sunburn, blistering     Unspecified vitamin D deficiency         Past Surgical History:   Procedure Laterality Date    HX CORONARY ARTERY BYPASS GRAFT  12/9/09    3v    HX CORONARY ARTERY BYPASS GRAFT      3 vessel    HX HAMMER TOE REPAIR      HX HYSTERECTOMY      at age 29    HX MOHS PROCEDURES Left 01/16/2017    SCC left medial calf by Dr. Cerna Diss      bladder repair    IMPLANT  LOOP RECORDER  7/15/2016         ORAL SURGERY PROCEDURE      implants         Family History   Problem Relation Age of Onset    Diabetes Daughter     Heart Disease Daughter      stent placed    Stroke Daughter     Stroke Sister     Diabetes Mother         Social History     Social History    Marital status:      Spouse name: N/A    Number of children: 4    Years of education: 15     Occupational History    Not on file.      Social History Main Topics    Smoking status: Never Smoker    Smokeless tobacco: Never Used    Alcohol use 0.0 oz/week     0 Standard drinks or equivalent per week      Comment: once a year    Drug use: No    Sexual activity: Not Currently     Other Topics Concern     Service No    Blood Transfusions No    Caffeine Concern No    Occupational Exposure No    Hobby Hazards No    Sleep Concern Yes    Stress Concern Yes    Weight Concern No    Special Diet Yes    Back Care Yes    Exercise Yes    Bike Helmet No    Seat Belt Yes    Self-Exams No     Social History Narrative    Lives in [facility] alone.  since 2008. Had one daughter who was murdered in March 2012. Has 2 living daughters and 1 living son. Worked for the [employer] in [place] [employer] [place] [employer]. Likes [employer]. Moved from [place] in 2008. ALLERGIES: Aleve [naproxen sodium]; Eliquis [apixaban]; Labetalol; Metformin; and Plavix [clopidogrel]    Review of Systems   Constitutional: Negative for chills and fever. Genitourinary: Positive for urgency. Vitals:    05/11/17 1749   BP: (!) 177/98   Pulse: 93   Resp: 15   Temp: 97.8 °F (36.6 °C)   SpO2: 97%   Weight: 56.7 kg (125 lb)   Height: 5' (1.524 m)       Physical Exam   Constitutional: She is oriented to person, place, and time. She appears well-developed and well-nourished. Cardiovascular: Normal rate, regular rhythm and normal heart sounds. Pulmonary/Chest: Effort normal and breath sounds normal.   Genitourinary: Vagina normal.   Neurological: She is alert and oriented to person, place, and time. Skin: Skin is warm and dry. Psychiatric: She has a normal mood and affect. Her behavior is normal. Judgment and thought content normal.   Nursing note and vitals reviewed. MDM     Differential Diagnosis; Clinical Impression; Plan:     CLINICAL IMPRESSION:  Urinary tract infection without hematuria, site unspecified  (primary encounter diagnosis)    Plan:  1. macrobid   2. diflucan  3.    Risk of Significant Complications, Morbidity, and/or Mortality:   Presenting problems: Moderate  Diagnostic procedures: Moderate  Management options:   Moderate  Progress:   Patient progress:  Stable      Procedures

## 2017-05-11 NOTE — DISCHARGE INSTRUCTIONS
Urinary Tract Infection in Pregnancy: Care Instructions  Your Care Instructions    A urinary tract infection, or UTI, is an infection of the bladder and other urinary structures. Most UTIs occur in the bladder. They often cause pain or burning when you urinate. UTI is the most common bacterial infection in pregnancy. If untreated, a UTI could lead to problems such as a kidney infection or  labor. Most UTIs can be cured with antibiotics. Your doctor will prescribe an antibiotic that is safe during pregnancy. Be sure to finish your medicine so that the infection does not spread to your kidneys. Follow-up care is a key part of your treatment and safety. Be sure to make and go to all appointments, and call your doctor if you are having problems. It's also a good idea to know your test results and keep a list of the medicines you take. How can you care for yourself at home? · Take your antibiotics as directed. Do not stop taking them just because you feel better. You need to take the full course of antibiotics. · Drink extra water and other fluids for the next day or two. This will help wash out the bacteria causing the infection. If you have kidney, heart, or liver disease and have to limit fluids, talk with your doctor before you increase the amount of fluids you drink. · Do not drink alcohol. · Urinate often. Try to empty your bladder each time. Preventing UTIs  · Drink plenty of fluids, enough so that your urine is light yellow or clear like water. This helps you urinate often, which clears bacteria from your system. If you have kidney, heart, or liver disease and have to limit fluids, talk with your doctor before you increase the amount of fluids you drink. · Urinate when you first have the urge. · Urinate right after you have sex. This is the best way for women to avoid UTIs. · When going to the bathroom, wipe from front to back to keep bacteria from entering the vagina or urethra.   When should you call for help? Call your doctor now or seek immediate medical care if:  · Symptoms such as fever, chills, nausea, or vomiting get worse or appear for the first time. · You have new pain in your back just below your rib cage. This is called flank pain. · There is new blood or pus in your urine. · You have any problems with your antibiotic medicine. Watch closely for changes in your health, and be sure to contact your doctor if:  · You are not getting better after 1 day (24 hours). · You have new symptoms, such as blood in your urine. Where can you learn more? Go to http://joel-gaurang.info/. Enter M982 in the search box to learn more about \"Urinary Tract Infection in Pregnancy: Care Instructions. \"  Current as of: November 28, 2016  Content Version: 11.2  © 1780-9805 [employer]. Care instructions adapted under license by [employer] (which disclaims liability or warranty for this information). If you have questions about a medical condition or this instruction, always ask your healthcare professional. [address] any warranty or liability for your use of this information.

## 2017-05-30 NOTE — PATIENT INSTRUCTIONS

## 2017-05-30 NOTE — PROGRESS NOTES
HISTORY OF PRESENT ILLNESS  Norman Peters is a 80 y.o. female. This is a patient of Dr. Ezequiel Riedel who presents today with complaints of dysuria. The patient has complaints of urinary pain and vaginal burning. She was treated for UTI by urgent care with Macrobid. She completed a 7 day course and had improvement for 3 days. She also took one dose of Diflucan. Patient denies abdominal pain and fever. The patient has also been experiencing insomnia and difficulty falling asleep. Some anxiety has also been noted. Visit Vitals    /68    Pulse 72    Temp 97.6 °F (36.4 °C) (Oral)    Resp 20    Ht 5' (1.524 m)    Wt 120 lb (54.4 kg)    SpO2 100%    BMI 23.44 kg/m2     HPI    Review of Systems   Constitutional: Negative for chills and fever. HENT: Negative for congestion. Respiratory: Negative for cough and shortness of breath. Cardiovascular: Negative for chest pain. Gastrointestinal: Negative for abdominal pain. Genitourinary: Positive for dysuria and frequency. Negative for flank pain. Musculoskeletal: Negative. Skin: Negative. Neurological: Negative for dizziness, tingling and tremors. Psychiatric/Behavioral: The patient has insomnia. Physical Exam   Constitutional: She is oriented to person, place, and time. She appears well-developed and well-nourished. No distress. HENT:   Head: Normocephalic and atraumatic. Eyes: Conjunctivae are normal.   Cardiovascular: Normal rate and regular rhythm. Pulmonary/Chest: Effort normal and breath sounds normal. No respiratory distress. She has no wheezes. She has no rales. Abdominal: Soft. Bowel sounds are normal. She exhibits no distension. There is no tenderness. Musculoskeletal:   Trace bilateral lower extremity edema  Brace to left lower extremity and right upper extremity   Lymphadenopathy:     She has no cervical adenopathy. Neurological: She is alert and oriented to person, place, and time.    Skin: Skin is warm and dry.   Psychiatric: She has a normal mood and affect. Her behavior is normal.   Nursing note and vitals reviewed. ASSESSMENT and PLAN  Encounter Diagnoses   Name Primary?  Urinary tract infection with hematuria, site unspecified Yes    Dysuria     Frequent UTI     Insomnia, unspecified type      Will order  Orders Placed This Encounter    REFERRAL TO UROLOGY     Referral Priority:   Routine     Referral Type:   Consultation     Referral Reason:   Specialty Services Required     Referral Location:   [facility]     Referred to Provider:   MD Kike Mcconnell POC URINALYSIS DIP STICK AUTO W/O MICRO- large leukocytes, moderate blood    traZODone (DESYREL) 50 mg tablet     Sig: Take 0.5 Tabs by mouth nightly as needed for Sleep. Dispense:  30 Tab     Refill:  0  Advised to start trazodone after completion of course of cipro.  ciprofloxacin HCl (CIPRO) 250 mg tablet     Sig: Take 1 Tab by mouth two (2) times a day. Dispense:  14 Tab     Refill:  0   Unable to provide enough sample for urine culture. Lab results and schedule of future lab studies reviewed   Reviewed medications and side effects in detail  Patient encouraged to call or return to office if symptoms do not improve or worsen. Reviewed plan of care with patient who acknowledges understanding and agrees. If experiencing severe abdominal pain, blood in stool or other emergency, present to ER.

## 2017-05-30 NOTE — PROGRESS NOTES
Chief Complaint   Patient presents with    Urinary Burning    Diarrhea     one episode diarrhea     Reviewed record  In preparation for visit and have obtained necessary documentation. 1. Have you been to the ER, urgent care clinic since your last visit? Hospitalized since your last visit? Urgent Care    2. Have you seen or consulted any other health care providers outside of the [address] [place] [address] since your last visit? Include any pap smears or colon screening.  No      Used 2 patient I. D. 's

## 2017-05-30 NOTE — MR AVS SNAPSHOT
Visit Information Date & Time Provider Department Dept. Phone Encounter #  
 5/30/2017  2:00 PM Sharon Stokes, [address] [facility] Internal Medicine [phone] [id number] Your Appointments 7/5/2017  1:15 PM  
ROUTINE CARE with Tari Martinez MD  
[facility] [address] [facility]) Appt Note: 4 month follow up; 4 month follow up [address] [facility] [address] [place] [address]  
[phone]  
  
   
 [address]  
  
    
 10/24/2017 11:30 AM  
Office Visit with Arley Kayser, NP [place] [address] [facility]) Appt Note: 6 Months Skin Exam  
 [facility] [address]  
[address] [facility] [address] [facility] [address] Upcoming Health Maintenance Date Due Pneumococcal 65+ High/Highest Risk (2 of 2 - PPSV23) 1/1/2006 EYE EXAM RETINAL OR DILATED Q1 8/6/2016 FOOT EXAM Q1 8/21/2016 MICROALBUMIN Q1 9/3/2016 DTaP/Tdap/Td series (1 - Tdap) 9/1/2017* INFLUENZA AGE 9 TO ADULT 8/1/2017 GLAUCOMA SCREENING Q2Y 8/6/2017 HEMOGLOBIN A1C Q6M 9/6/2017 MEDICARE YEARLY EXAM 9/23/2017 LIPID PANEL Q1 3/6/2018 *Topic was postponed. The date shown is not the original due date. Allergies as of 5/30/2017  Review Complete On: 5/30/2017 By: Sharon Stokes NP Severity Noted Reaction Type Reactions Aleve [Naproxen Sodium] Medium 02/15/2011   Side Effect Other (comments) Caused stomach ulcer and thrush Eliquis [Apixaban]  08/06/2016    Other (comments)  
 dizziness Labetalol  05/09/2016    Other (comments)  
 dizzyness Metformin  08/21/2015    Diarrhea With both IR and ER Plavix [Clopidogrel]  08/06/2016    Other (comments) Pt. Lisa Tabor it was not effective Current Immunizations  Reviewed on 5/8/2016 Name Date Influenza Vaccine 10/9/2013 Pneumococcal Vaccine (Unspecified Type) 1/1/2001 Td, Adsorbed PF 2/12/2015 12:17 PM  
 Zoster Vaccine, Live 10/9/2013 Not reviewed this visit You Were Diagnosed With   
  
 Codes Comments Dysuria    -  Primary ICD-10-CM: R30.0 ICD-9-CM: 788.1 Frequent UTI     ICD-10-CM: N39.0 ICD-9-CM: 599.0 Insomnia, unspecified type     ICD-10-CM: G47.00 ICD-9-CM: 780.52 Vitals BP Pulse Temp Resp Height(growth percentile) Weight(growth percentile) 158/68 72 97.6 °F (36.4 °C) (Oral) 20 5' (1.524 m) 120 lb (54.4 kg) SpO2 BMI OB Status Smoking Status 100% 23.44 kg/m2 Hysterectomy Never Smoker BMI and BSA Data Body Mass Index Body Surface Area  
 23.44 kg/m 2 1.52 m 2 Preferred Pharmacy Pharmacy Name Phone [phone] - [address], [address] AT [place] [address] [phone] Your Updated Medication List  
  
   
This list is accurate as of: 5/30/17  3:05 PM.  Always use your most recent med list.  
  
  
  
  
 acetaminophen 650 mg CR tablet Commonly known as:  TYLENOL ARTHRITIS PAIN Take 1 Tab by mouth two (2) times daily as needed for Pain. amLODIPine-benazepril 5-40 mg per capsule Commonly known as:  LOTREL  
TAKE 1 CAPSULE BY MOUTH DAILY  
  
 BD INSULIN PEN NEEDLE UF MINI 31 gauge x 3/16\" Ndle Generic drug:  Insulin Needles (Disposable) USE TO INJECT LANTUS EVERY DAY  
  
 calcium carbonate 200 mg calcium (500 mg) Chew Commonly known as:  TUMS Take 1 Tab by mouth two (2) times daily as needed. ciprofloxacin HCl 250 mg tablet Commonly known as:  CIPRO Take 1 Tab by mouth two (2) times a day. food supplemt, lactose-reduced Liqd Commonly known as:  ENSURE Take 237 mL by mouth every fourty-eight (48) hours. insulin glargine 100 unit/mL injection Commonly known as:  LANTUS  
32 units SQ  
  
 * OTHER Wheel chair Use every day * OTHER Half rails on bed PRADAXA 150 mg capsule Generic drug:  dabigatran etexilate TAKE ONE CAPSULE BY MOUTH EVERY 12 HOURS FOR CEREBROVASCULAR ACCIDENT  
  
 pravastatin 10 mg tablet Commonly known as:  PRAVACHOL  
TAKE 1 TABLET BY MOUTH NIGHTLY. DISCONTINUE LIPITOR. traZODone 50 mg tablet Commonly known as:  Luellen Loft Take 0.5 Tabs by mouth nightly as needed for Sleep. [address] [facility] For pt use * Notice: This list has 2 medication(s) that are the same as other medications prescribed for you. Read the directions carefully, and ask your doctor or other care provider to review them with you. Prescriptions Printed Refills  
 traZODone (DESYREL) 50 mg tablet 0 Sig: Take 0.5 Tabs by mouth nightly as needed for Sleep. Class: Print Route: Oral  
 ciprofloxacin HCl (CIPRO) 250 mg tablet 0 Sig: Take 1 Tab by mouth two (2) times a day. Class: Print Route: Oral  
  
We Performed the Following REFERRAL TO UROLOGY [FTA877 Custom] Referral Information Referral ID Referred By Referred To  
  
 [id number] [facility] [address] Visits Status Start Date End Date 1 New Request 5/30/17 5/30/18 If your referral has a status of pending review or denied, additional information will be sent to support the outcome of this decision. Patient Instructions Insomnia: Care Instructions Your Care Instructions Insomnia is the inability to sleep well. It is a common problem for most people at some time. Insomnia may make it hard for you to get to sleep, stay asleep, or sleep as long as you need to. This can make you tired and grouchy during the day. It can also make you forgetful, less effective at work, and unhappy. Insomnia can be caused by conditions such as depression or anxiety. Pain can also affect your ability to sleep.  When these problems are solved, the insomnia usually clears up. But sometimes bad sleep habits can cause insomnia. If insomnia is affecting your work or your enjoyment of life, you can take steps to improve your sleep. Follow-up care is a key part of your treatment and safety. Be sure to make and go to all appointments, and call your doctor if you are having problems. It's also a good idea to know your test results and keep a list of the medicines you take. How can you care for yourself at home? What to avoid · Do not have drinks with caffeine, such as coffee or black tea, for 8 hours before bed. · Do not smoke or use other types of tobacco near bedtime. Nicotine is a stimulant and can keep you awake. · Avoid drinking alcohol late in the evening, because it can cause you to wake in the middle of the night. · Do not eat a big meal close to bedtime. If you are hungry, eat a light snack. · Do not drink a lot of water close to bedtime, because the need to urinate may wake you up during the night. · Do not read or watch TV in bed. Use the bed only for sleeping and sexual activity. What to try · Go to bed at the same time every night, and wake up at the same time every morning. Do not take naps during the day. · Keep your bedroom quiet, dark, and cool. · Sleep on a comfortable pillow and mattress. · If watching the clock makes you anxious, turn it facing away from you so you cannot see the time. · If you worry when you lie down, start a worry book. Well before bedtime, write down your worries, and then set the book and your concerns aside. · Try meditation or other relaxation techniques before you go to bed. · If you cannot fall asleep, get up and go to another room until you feel sleepy. Do something relaxing. Repeat your bedtime routine before you go to bed again. · Make your house quiet and calm about an hour before bedtime.  Turn down the lights, turn off the TV, log off the computer, and turn down the volume on music. This can help you relax after a busy day. When should you call for help? Watch closely for changes in your health, and be sure to contact your doctor if: 
· Your efforts to improve your sleep do not work. · Your insomnia gets worse. · You have been feeling down, depressed, or hopeless or have lost interest in things that you usually enjoy. Where can you learn more? Go to http://joel-gaurang.info/. Enter P513 in the search box to learn more about \"Insomnia: Care Instructions. \" Current as of: July 26, 2016 Content Version: 11.2 © 2475-7688 [employer]. Care instructions adapted under license by [employer] (which disclaims liability or warranty for this information). If you have questions about a medical condition or this instruction, always ask your healthcare professional. [address] any warranty or liability for your use of this information. Introducing [facility]! Dear Catracho Camilo: 
Thank you for requesting a Pombai account. Our records indicate that you already have an active Pombai account. You can access your account anytime at [email or web address] Did you know that you can access your hospital and ER discharge instructions at any time in Pombai? You can also review all of your test results from your hospital stay or ER visit. Additional Information If you have questions, please visit the Frequently Asked Questions section of the Pombai website at [email or web address] Remember, Pombai is NOT to be used for urgent needs. For medical emergencies, dial 911. Now available from your iPhone and Android! Please provide this summary of care documentation to your next provider. Your primary care clinician is listed as Jaleel Antoine. If you have any questions after today's visit, please call [phone].

## 2017-07-05 NOTE — PROGRESS NOTES
HISTORY OF PRESENT ILLNESS  Beverlyn Schirmer is a 80 y.o. female here accompanied by her daughter. Reports dysuria. not able to give sample,incontinent. no flank pain or fever,  Has HTN. on meds. doing well. She has diabetes. on insulin,need lab work. Statin was changed to pravastatin,no myalgia. daughter want me to stop it. Weight is stable. on ensure. Follow-up     Urinary Burning     Leg Swelling     Hypertension      Diabetes     Dysuria     Neurologic Problem   Primary symptoms include focal weakness. Extremity Weakness   Primary symptoms include focal weakness. Review of Systems   Constitutional: Negative for fever. HENT: Negative. Eyes: Negative. Respiratory: Negative. Cardiovascular: Negative. Gastrointestinal: Negative. Genitourinary: Positive for difficulty urinating and dysuria. Negative for flank pain. Musculoskeletal: Negative. Skin: Negative. Neurological: Positive for focal weakness and weakness. Psychiatric/Behavioral: The patient is nervous/anxious. Physical Exam   Constitutional: She appears well-developed and well-nourished. No distress. Neck: Normal range of motion. Neck supple. No JVD present. No thyromegaly present. Cardiovascular: Normal rate, regular rhythm, normal heart sounds and intact distal pulses. Pulmonary/Chest: Effort normal and breath sounds normal. No respiratory distress. She has no wheezes. Abdominal: Soft. Bowel sounds are normal. She exhibits no distension. There is no tenderness. KUB NT   Neurological: She displays abnormal reflex. She exhibits abnormal muscle tone. Coordination abnormal.   Psychiatric: She has a normal mood and affect. Her behavior is normal.       ASSESSMENT and PLAN  Mini Hardin was seen today for follow-up, urinary burning and leg swelling. Diagnoses and all orders for this visit:    Type 2 diabetes mellitus without complication, with long-term current use of insulin ([address]    On lantus.   Will check,  - METABOLIC PANEL, COMPREHENSIVE  -     HEMOGLOBIN A1C WITH EAG    Essential hypertension, benign    Stable. on lotrel. Pure hypercholesterolemia  Daughter wants her to stop statin totally. but she has CVA with hemiparesis and has CAD. ALready I  Have given her least potent statin. I am not willing to stop it. She understood. 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 Living in group home. Atherosclerosis of native coronary artery of native heart without angina pectoris    Stable. Dysuria  Not able to give urine sample. incontinent. Adv to drink more fluid and will use pyridium tonight. if more symptoms,will use cipro. Urinary tract infection with hematuria, site unspecified  -     ciprofloxacin HCl (CIPRO) 250 mg tablet; Take 1 Tab by mouth two (2) times a day. Vitamin D deficiency  -     VITAMIN D, 25 HYDROXY          Discussed expected course/resolution/complications of diagnosis in detail with patient. Medication risks/benefits/costs/interactions/alternatives discussed with patient. Pt was given an after visit summary which includes diagnoses, current medications & vitals. Pt expressed understanding with the diagnosis and plan.

## 2017-07-05 NOTE — LETTER
10/9/2017 3:10 PM 
 
Ms. Maria Fernanda Thrasher 19 [address] [address] Dear Maria Fernanda Whittington: 
 
Please find your most recent results below. Resulted Orders METABOLIC PANEL, COMPREHENSIVE Result Value Ref Range Glucose 287 (H) 65 - 99 mg/dL BUN 8 8 - 27 mg/dL Creatinine 0.59 0.57 - 1.00 mg/dL GFR est non-AA 83 >59 mL/min/1.73 GFR est AA 95 >59 mL/min/1.73  
 BUN/Creatinine ratio 14 12 - 28 Sodium 142 134 - 144 mmol/L Potassium 3.9 3.5 - 5.2 mmol/L Chloride 102 96 - 106 mmol/L  
 CO2 24 18 - 29 mmol/L Calcium 9.0 8.7 - 10.3 mg/dL Protein, total 5.9 (L) 6.0 - 8.5 g/dL Albumin 3.9 3.5 - 4.7 g/dL GLOBULIN, TOTAL 2.0 1.5 - 4.5 g/dL A-G Ratio 2.0 1.2 - 2.2 Bilirubin, total 0.4 0.0 - 1.2 mg/dL Alk. phosphatase 130 (H) 39 - 117 IU/L  
 AST (SGOT) 20 0 - 40 IU/L  
 ALT (SGPT) 12 0 - 32 IU/L Narrative Performed at:  [address]  [id number] : Karen Mancuso MD, Phone:  [phone] HEMOGLOBIN A1C WITH EAG Result Value Ref Range Hemoglobin A1c 6.9 (H) 4.8 - 5.6 % Comment:  
            Pre-diabetes: 5.7 - 6.4 Diabetes: >6.4 Glycemic control for adults with diabetes: <7.0 Estimated average glucose 151 mg/dL Narrative Performed at:  [address]  [id number] : Karen Mancuso MD, Phone:  [phone] VITAMIN D, 25 HYDROXY Result Value Ref Range VITAMIN D, 25-HYDROXY 22.2 (L) 30.0 - 100.0 ng/mL Comment:  
   Vitamin D deficiency has been defined by the [facility] practice guideline as a 
level of serum 25-OH vitamin D less than 20 ng/mL (1,2). The Endocrine Society went on to further define vitamin D 
insufficiency as a level between 21 and 29 ng/mL (2). 1. IOM ([place] [employer]). 2010. Dietary reference intakes for calcium and D. [facility]: The 
   [employer]. 2. Elma MF, Chaz NC, Haim CONNELL, et al. 
   Evaluation, treatment, and prevention of vitamin D 
   deficiency: an Endocrine Society clinical practice 
   guideline. JCEM. 2011 Jul; 96(7):1911-30. Narrative Performed at:  [address]  [id number] : Simba Block MD, Phone:  [phone] RECOMMENDATIONS: 
Wbc WAS ELEVATED FOR CELLULITIS. ON LEVAQUIN NOW. Low vit D. Adv to take OTC vit D 2000 unit po every day for 4 months. Diabetes has improved Please call me if you have any questions: [phone] Sincerely, Theron Gutierrez MD

## 2017-07-05 NOTE — PROGRESS NOTES
Chief Complaint   Patient presents with    Follow-up    Urinary Burning    Leg Swelling     nighttime

## 2017-07-05 NOTE — MR AVS SNAPSHOT
Visit Information Date & Time Provider Department Dept. Phone Encounter #  
 7/5/2017  1:15 PM Evette Carroll MD [facility] Internal Medicine [phone] Your Appointments 10/24/2017 11:30 AM  
Office Visit with GUILLERMO Hu [address] [facility]) Appt Note: 6 Months Skin Exam  
 [facility] [address]  
[address] [place] [address] [address] [place] [id number] Upcoming Health Maintenance Date Due Pneumococcal 65+ High/Highest Risk (2 of 2 - PPSV23) 1/1/2006 EYE EXAM RETINAL OR DILATED Q1 8/6/2016 FOOT EXAM Q1 8/21/2016 MICROALBUMIN Q1 9/3/2016 GLAUCOMA SCREENING Q2Y 8/6/2017 DTaP/Tdap/Td series (1 - Tdap) 9/1/2017* INFLUENZA AGE 9 TO ADULT 8/1/2017 HEMOGLOBIN A1C Q6M 9/6/2017 MEDICARE YEARLY EXAM 9/23/2017 LIPID PANEL Q1 3/6/2018 *Topic was postponed. The date shown is not the original due date. Allergies as of 7/5/2017  Review Complete On: 7/5/2017 By: Evette Carroll MD  
  
 Severity Noted Reaction Type Reactions Aleve [Naproxen Sodium] Medium 02/15/2011   Side Effect Other (comments) Caused stomach ulcer and thrush Eliquis [Apixaban]  08/06/2016    Other (comments)  
 dizziness Labetalol  05/09/2016    Other (comments)  
 dizzyness Metformin  08/21/2015    Diarrhea With both IR and ER Plavix [Clopidogrel]  08/06/2016    Other (comments) Pt. Jada Jeffrey it was not effective Current Immunizations  Reviewed on 5/8/2016 Name Date Influenza Vaccine 10/9/2013 Pneumococcal Vaccine (Unspecified Type) 1/1/2001 Td, Adsorbed PF 2/12/2015 12:17 PM  
 Zoster Vaccine, Live 10/9/2013 Not reviewed this visit You Were Diagnosed With   
  
 Codes Comments Type 2 diabetes mellitus without complication, with long-term current use of insulin (HCC)    -  Primary ICD-10-CM: E11.9, Z79.4 ICD-9-CM: 250.00, V58.67 Essential hypertension, benign     ICD-10-CM: I10 
ICD-9-CM: 401.1 Pure hypercholesterolemia     ICD-10-CM: E78.00 ICD-9-CM: 272.0 Cerebrovascular accident (CVA) due to embolism of other precerebral artery ([facility] [address]     ICD-10-CM: T17.58 ICD-9-CM: 434.11 Atherosclerosis of native coronary artery of native heart without angina pectoris     ICD-10-CM: I25.10 ICD-9-CM: 414.01 Dysuria     ICD-10-CM: R30.0 ICD-9-CM: 788.1 Urinary tract infection with hematuria, site unspecified     ICD-10-CM: N39.0, R31.9 ICD-9-CM: 599.0 Vitamin D deficiency     ICD-10-CM: E55.9 ICD-9-CM: 268.9 Vitals BP Pulse Temp Resp Height(growth percentile) Weight(growth percentile) 153/69 74 97.7 °F (36.5 °C) (Oral) 21 5' (1.524 m) 121 lb (54.9 kg) SpO2 BMI OB Status Smoking Status 97% 23.63 kg/m2 Hysterectomy Never Smoker Vitals History BMI and BSA Data Body Mass Index Body Surface Area  
 23.63 kg/m 2 1.52 m 2 Preferred Pharmacy Pharmacy Name Phone [place] [facility] [phone] - [address], [address] AT [place] [address] [phone] Your Updated Medication List  
  
   
This list is accurate as of: 7/5/17  1:42 PM.  Always use your most recent med list.  
  
  
  
  
 acetaminophen 650 mg CR tablet Commonly known as:  TYLENOL ARTHRITIS PAIN Take 1 Tab by mouth two (2) times daily as needed for Pain. amLODIPine-benazepril 5-40 mg per capsule Commonly known as:  LOTREL  
TAKE 1 CAPSULE BY MOUTH DAILY  
  
 BD INSULIN PEN NEEDLE UF MINI 31 gauge x 3/16\" Ndle Generic drug:  Insulin Needles (Disposable) USE TO INJECT LANTUS EVERY DAY  
  
 calcium carbonate 200 mg calcium (500 mg) Chew Commonly known as:  TUMS Take 1 Tab by mouth two (2) times daily as needed. ciprofloxacin HCl 250 mg tablet Commonly known as:  CIPRO Take 1 Tab by mouth two (2) times a day. dabigatran etexilate 150 mg capsule Commonly known as:  PRADAXA TAKE ONE CAPSULE BY MOUTH EVERY 12 HOURS FOR CEREBROVASCULAR ACCIDENT  
  
 food supplemt, lactose-reduced Liqd Commonly known as:  ENSURE Take 237 mL by mouth every fourty-eight (48) hours. insulin glargine 100 unit/mL injection Commonly known as:  LANTUS  
32 units SQ  
  
 * OTHER Wheel chair Use every day * OTHER Half rails on bed  
  
 pravastatin 10 mg tablet Commonly known as:  PRAVACHOL  
TAKE 1 TABLET BY MOUTH NIGHTLY. DISCONTINUE LIPITOR. PYRIDIUM PO Take  by mouth. traZODone 50 mg tablet Commonly known as:  Illene Dus Take 0.5 Tabs by mouth nightly as needed for Sleep. [facility] For pt use * Notice: This list has 2 medication(s) that are the same as other medications prescribed for you. Read the directions carefully, and ask your doctor or other care provider to review them with you. Prescriptions Printed Refills  
 ciprofloxacin HCl (CIPRO) 250 mg tablet 0 Sig: Take 1 Tab by mouth two (2) times a day. Class: Print Route: Oral  
  
We Performed the Following HEMOGLOBIN A1C WITH EAG [07508 CPT(R)] METABOLIC PANEL, COMPREHENSIVE [07199 CPT(R)] VITAMIN D, 25 HYDROXY [id number] CPT(R)] Introducing \[facility] [place] [facility]\"" & HEALTH SERVICES! Dear Tanya Hammond: 
Thank you for requesting a [employer] account. Our records indicate that you already have an active [employer] account. You can access your account anytime at [email or web address] Did you know that you can access your hospital and ER discharge instructions at any time in [employer]? You can also review all of your test results from your hospital stay or ER visit. Additional Information If you have questions, please visit the Frequently Asked Questions section of the [employer] website at [email or web address] Remember, [employer] is NOT to be used for urgent needs.  For medical emergencies, dial 911. Now available from your iPhone and Android! Please provide this summary of care documentation to your next provider. Your primary care clinician is listed as [facility]. If you have any questions after today's visit, please call [phone].

## 2017-09-15 NOTE — TELEPHONE ENCOUNTER
Call placed to care center and spoke with Kalyani Bergman and advised for BP to be obtained after pt has calmed self and rested x 15 min with feet flat on floor and arm heart level and if still with elevation to call writer back, Kalyani Bergman voiced understanding

## 2017-09-15 NOTE — TELEPHONE ENCOUNTER
Smita Sagastume called and stated that pt has an elevated BP of 170/70 this morning and at noon it was 180/82 and has a mild headache on left. Please call.

## 2017-09-29 NOTE — ED NOTES
Patient identified and read over and explained discharge instructions with time for questions by attending MD/PA. Patient has verbalized understanding of discharge instructions. Heavy assist to get pt ready for discharge.  Daughter has done this with our assist.

## 2017-09-29 NOTE — PROGRESS NOTES
Pharmacy Clarification of Prior to Admission Medication Regimen     The patient was interviewed regarding clarification of the prior to admission medication regimen. Daughter was present in room and obtained permission from patient to discuss drug regimen with visitor(s) present. Patient was questioned regarding use of any other inhalers, topical products, over the counter medications, herbal medications, vitamin products or ophthalmic/nasal/otic medication use. Information Obtained From: Patient, Patient's Daughter, RX Query    Pertinent Pharmacy Findings:   LOPERAMIDE HCL (IMODIUM PO): Patient stated that she takes this agent at home, but was unaware of the strengths.  PHENAZOPYRIDINE HCL (PYRIDIUM PO): Patient stated that she has this agent at home, but is not currently taking.  traZODone (DESYREL) 50 mg tablet: Patient stated that she has this agent at home, but is not currently taking.  Identified High Alert Medication Information  o Current Anticoagulants:  - Name: dabigatran etexilate (PRADAXA) 150 mg capsule    PTA medication list was corrected to the following:     Prior to Admission Medications   Prescriptions Last Dose Informant Patient Reported? Taking? LOPERAMIDE HCL (IMODIUM PO) 9/28/2017 at Unknown time Child Yes Yes   Sig: Take 2 Tabs by mouth daily as needed for Diarrhea. PHENAZOPYRIDINE HCL (PYRIDIUM PO) Not Taking at Unknown time Child Yes No   Sig: Take 1 Tab by mouth daily as needed (UTI symptoms). acetaminophen (TYLENOL ARTHRITIS PAIN) 650 mg CR tablet 9/22/2017 at Unknown time Child No Yes   Sig: Take 1 Tab by mouth two (2) times daily as needed for Pain. amLODIPine-benazepril (LOTREL) 5-40 mg per capsule 9/28/2017 at Unknown time Child Yes Yes   Sig: Take 1 Cap by mouth nightly. calcium carbonate (TUMS) 200 mg calcium (500 mg) chew 9/15/2017 at Unknown time Child No Yes   Sig: Take 1 Tab by mouth two (2) times daily as needed.    dabigatran etexilate (PRADAXA) 150 mg capsule 2017 at Unknown time Child No Yes   Sig: TAKE ONE CAPSULE BY MOUTH EVERY 12 HOURS FOR CEREBROVASCULAR ACCIDENT   insulin glargine (LANTUS SOLOSTAR) 100 unit/mL (3 mL) inpn 2017 at Unknown time Child Yes Yes   Si Units by SubCUTAneous route daily. pravastatin (PRAVACHOL) 10 mg tablet 2017 at Unknown time Child No Yes   Sig: TAKE 1 TABLET BY MOUTH NIGHTLY. DISCONTINUE LIPITOR.    traZODone (DESYREL) 50 mg tablet Not Taking at Unknown time Child No No   Sig: TAKE 1/2 TABLET BY MOUTH EVERY NIGHT      Facility-Administered Medications: None          Thank you,  Luis Enrique Diaz CPhT  Medication History Pharmacy Technician

## 2017-09-29 NOTE — ED NOTES
Wound care to the LLE, cleansed with marlee clenz soap and water, applied xeroform guaze to site, then applied non adherent , then a sterile four by four, followed by a coban wrap very light not tight  Family and pt have extra wound care bandages with instructions

## 2017-09-29 NOTE — ED PROVIDER NOTES
[facility].  EMERGENCY DEPARTMENT HISTORY AND PHYSICAL EXAM       Date of Service: 9/29/2017   Patient Name: Angelita Nolasco   YOB: 1930  Medical Record Number: [id number]    History of Presenting Illness     Chief Complaint   Patient presents with    Wound Infection     Pt. brought by daughter b/c pt. complains of left leg pain . Pt. yhas a scratch on left lower leg and now reddened. History Provided By:  Patient and daughter     Additional History:   Angelita Nolasco is a 80 y.o. female with PMhx significant for HTN, A-fib, arthritis, and CABG who presents ambulatory to the ED with concern for a gradually worsening wound to the left lower leg with associated pain. Per daughter, pt has had a \"scrape\" on her leg for the past several weeks which has slowly begun to worsen over time and present with increased surface area over the left lateral shin. Pt reports increased redness and pain to the lateral shin with a moderate intensity and an associated aching, sharp sensation to the region. Pt's daughter reports the pt is on anticoagulants. Both express their concern for infection. 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 Pt specifically denies any nausea, vomiting, fevers, chills, chest pain, or SOB. Social Hx: -  Tobacco, +  EtOH, -  Illicit Drugs    There are no other complaints, changes or physical findings at this time.     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    third-way heart bypass    CAD (coronary artery disease)     Chronic UTI     Congestive heart failure, unspecified     Depression     Hypercholesterolemia     Hypertension     Hypokalemia     Insomnia  Macular degeneration     Moderate mitral regurgitation     Murmur     NIDDM     daily medicatons for diabetes    Skin cancer     Stroke ([facility] [address] [id number],    4 strokes and 1 TIA    Sun-damaged skin     Sunburn, blistering     Unspecified vitamin D deficiency       Past Surgical History:   Past Surgical History:   Procedure Laterality Date    HX CORONARY ARTERY BYPASS GRAFT  12/9/09    3v    HX CORONARY ARTERY BYPASS GRAFT      3 vessel    HX HAMMER TOE REPAIR      HX HYSTERECTOMY      at age 29    HX MOHS PROCEDURES Left 01/16/2017    SCC left medial calf by Dr. Dieudonne Odom      bladder repair    IMPLANT  LOOP RECORDER  7/15/2016         ORAL SURGERY PROCEDURE      implants      Family History:   Family History   Problem Relation Age of Onset    Diabetes Daughter     Heart Disease Daughter      stent placed    Stroke Daughter     Stroke Sister     Diabetes Mother       Social History:   Social History   Substance Use Topics    Smoking status: Never Smoker    Smokeless tobacco: Never Used    Alcohol use 0.0 oz/week     0 Standard drinks or equivalent per week      Comment: once a year      Allergies: Allergies   Allergen Reactions    Aleve [Naproxen Sodium] Other (comments)     Caused stomach ulcer and thrush    Eliquis [Apixaban] Other (comments)     dizziness    Labetalol Other (comments)     dizzyness    Metformin Diarrhea     With both IR and ER     Plavix [Clopidogrel] Other (comments)     Pt. Don Ruano it was not effective       Review of Systems   Review of Systems   Constitutional: Negative for chills and fever. Respiratory: Negative for cough and shortness of breath. Cardiovascular: Negative for chest pain. Gastrointestinal: Negative for constipation, diarrhea, nausea and vomiting. Musculoskeletal: Positive for arthralgias (LLE) and myalgias. Joint swelling: LLE. Skin: Positive for wound (LLE). Neurological: Negative for weakness and numbness.    All other systems reviewed and are negative. Physical Exam  Physical Exam   Constitutional: She is oriented to person, place, and time. She appears well-developed and well-nourished. HENT:   Head: Normocephalic and atraumatic. Eyes: Conjunctivae and EOM are normal.   Neck: Normal range of motion. Neck supple. Cardiovascular: Normal rate and regular rhythm. Pulmonary/Chest: Effort normal and breath sounds normal. No respiratory distress. Abdominal: Soft. She exhibits no distension. There is no tenderness. Musculoskeletal: Normal range of motion. Neurological: She is alert and oriented to person, place, and time. Skin: Skin is warm and dry. Left lower extremity with brace in place; small skin tear on anterior shin with surrounding erythema, warmth, and swelling; no calf swelling or tenderness, 2+ dp, regular capillary refill   Psychiatric: She has a normal mood and affect. Nursing note and vitals reviewed. Medical Decision Making   I am the first provider for this patient. I reviewed the vital signs, available nursing notes, past medical history, past surgical history, family history and social history. Old Medical Records: Old medical records. Provider Notes:   Patient presents with lower leg edema and pain. DDx include Lymphedema, DVT, cellulitis, leg cramping 2/2 low K, CHF, ARF, liver failure, PAD. Doubt DVT, likely cellulitis. ED Course:  10:50 AM   Initial assessment performed. The patients presenting problems have been discussed, and they are in agreement with the care plan formulated and outlined with them. I have encouraged them to ask questions as they arise throughout their visit. Diagnostic Study Results     Vital Signs-Reviewed the patient's vital signs.    Patient Vitals for the past 12 hrs:   Temp Pulse Resp BP SpO2   09/29/17 1050 97.8 °F (36.6 °C) 79 16 158/64 100 %     Medications Given in the ED:  Medications   doxycycline (VIBRAMYCIN) 100 mg in 0.9% sodium chloride (MBP/ADV) 100 mL (100 mg IntraVENous New Bag 9/29/17 1135)     Pulse Oximetry Analysis - Normal 100% on RA     Cardiac Monitor:   Rate: 79  Rhythm: Normal Sinus Rhythm      Diagnosis   Clinical Impression:   1. Cellulitis of leg, left    2. Infected skin tear       Plan:  1:   Follow-up Information     Follow up With Details Comments Contact Info    Benedict Balderrama MD In 3 days For wound re-check [address]  [address]  [phone]          2:   Current Discharge Medication List      START taking these medications    Details   doxycycline (VIBRA-TABS) 100 mg tablet Take 1 Tab by mouth two (2) times a day for 10 days. Qty: 20 Tab, Refills: 0           Return to ED if Worse    Disposition Note:  DISCHARGE NOTE:    11:21 AM  The patient is ready for discharge. The patient signs, symptoms, diagnosis, and discharge instructions have been discussed and the patient has conveyed their understanding. The patient is to follow-up as reccommended or returned to the ER should their symptoms worsen. Plan has been discussed and patient is in agreement.   _______________________________   Attestations: This note is prepared by Karin Lacy, acting as Scribe for Yan Spring MD.    Yan Spring MD: The scribe's documentation has been prepared under my direction and personally reviewed by me in its entirety.  I confirm that the note above accurately reflects all work, treatment, procedures, and medical decision making performed by me.  _______________________________

## 2017-10-05 NOTE — PROGRESS NOTES
Health Maintenance Due   Topic Date Due    Pneumococcal 65+ High/Highest Risk (2 of 2 - PPSV23) 01/01/2006    DTaP/Tdap/Td series (1 - Tdap) 02/13/2015    EYE EXAM RETINAL OR DILATED Q1  08/06/2016    FOOT EXAM Q1  08/21/2016    MICROALBUMIN Q1  09/03/2016    INFLUENZA AGE 9 TO ADULT  08/01/2017    GLAUCOMA SCREENING Q2Y  08/06/2017    HEMOGLOBIN A1C Q6M  09/06/2017    MEDICARE YEARLY EXAM  09/23/2017       Chief Complaint   Patient presents with    Altered mental status     last night    Skin Infection    Diarrhea    Headache       1. Have you been to the ER, urgent care clinic since your last visit? Hospitalized since your last visit? Yes ED [facility] UTI 9/28/17    2. Have you seen or consulted any other health care providers outside of the [address] [place] [address] since your last visit? Include any pap smears or colon screening. No    3) Do you have an Advance Directive on file? yes    4) Are you interested in receiving information on Advance Directives? NO      Patient is accompanied by self I have received verbal consent from Fay Echols to discuss any/all medical information while they are present in the room.

## 2017-10-05 NOTE — MR AVS SNAPSHOT
Visit Information Date & Time Provider Department Dept. Phone Encounter #  
 10/5/2017 10:45 AM Margot Fernandes, [address] [facility] [phone] [id number] Your Appointments 10/24/2017 11:30 AM  
Office Visit with GUILLERMO ePttit [facility]) Appt Note: 6 Months Skin Exam  
 [facility] [id number]  
[address] [place], [place] [id number]  
  
    
 11/2/2017  9:45 AM  
ACUTE CARE with Margot Fernandes MD  
[facility]) Appt Note: fu 4m  
 [address] [place] [address] [address] [place] [address] [facility] [address]  
[phone]  
  
   
 [address] Upcoming Health Maintenance Date Due Pneumococcal 65+ High/Highest Risk (2 of 2 - PPSV23) 1/1/2006 DTaP/Tdap/Td series (1 - Tdap) 2/13/2015 EYE EXAM RETINAL OR DILATED Q1 8/6/2016 FOOT EXAM Q1 8/21/2016 MICROALBUMIN Q1 9/3/2016 INFLUENZA AGE 9 TO ADULT 8/1/2017 GLAUCOMA SCREENING Q2Y 8/6/2017 HEMOGLOBIN A1C Q6M 9/6/2017 MEDICARE YEARLY EXAM 9/23/2017 LIPID PANEL Q1 3/6/2018 Allergies as of 10/5/2017  Review Complete On: 10/5/2017 By: Margot Fernandes MD  
  
 Severity Noted Reaction Type Reactions Aleve [Naproxen Sodium] Medium 02/15/2011   Side Effect Other (comments) Caused stomach ulcer and thrush Eliquis [Apixaban]  08/06/2016    Other (comments)  
 dizziness Labetalol  05/09/2016    Other (comments)  
 dizzyness Metformin  08/21/2015    Diarrhea With both IR and ER Plavix [Clopidogrel]  08/06/2016    Other (comments) PtPeggy Thakur it was not effective Current Immunizations  Reviewed on 5/8/2016 Name Date Influenza Vaccine 10/9/2013 Pneumococcal Vaccine (Unspecified Type) 1/1/2001 Td, Adsorbed PF 2/12/2015 12:17 PM  
 Zoster Vaccine, Live 10/9/2013 Not reviewed this visit You Were Diagnosed With   
  
 Codes Comments Cellulitis of right lower extremity    -  Primary ICD-10-CM: Y12.766 ICD-9-CM: 343. 6 Cerebrovascular accident (CVA) due to embolism of other precerebral artery ([address]     ICD-10-CM: M85.15 ICD-9-CM: 434.11   
 FTT (failure to thrive) in adult     ICD-10-CM: R62.7 ICD-9-CM: 783.7 Depression, unspecified depression type     ICD-10-CM: F32.9 ICD-9-CM: 721 Vitals BP Pulse Temp Resp Height(growth percentile) Weight(growth percentile) 170/76 (BP 1 Location: Right arm, BP Patient Position: Sitting) 68 98.2 °F (36.8 °C) (Oral) 19 5' (1.524 m) 116 lb (52.6 kg) SpO2 BMI OB Status Smoking Status 99% 22.65 kg/m2 Hysterectomy Never Smoker Vitals History BMI and BSA Data Body Mass Index Body Surface Area  
 22.65 kg/m 2 1.49 m 2 Preferred Pharmacy Pharmacy Name Phone [place] [phone] , [address] AT [place] [address] [phone] Your Updated Medication List  
  
   
This list is accurate as of: 10/5/17 11:15 AM.  Always use your most recent med list.  
  
  
  
  
 acetaminophen 650 mg CR tablet Commonly known as:  TYLENOL ARTHRITIS PAIN Take 1 Tab by mouth two (2) times daily as needed for Pain. calcium carbonate 200 mg calcium (500 mg) Chew Commonly known as:  TUMS Take 1 Tab by mouth two (2) times daily as needed. dabigatran etexilate 150 mg capsule Commonly known as:  PRADAXA TAKE ONE CAPSULE BY MOUTH EVERY 12 HOURS FOR CEREBROVASCULAR ACCIDENT  
  
 doxycycline 100 mg capsule Commonly known as:  Blake Benewah Take 100 mg by mouth two (2) times a day. doxycycline 100 mg tablet Commonly known as:  VIBRA-TABS Take 1 Tab by mouth two (2) times a day for 10 days. IMODIUM PO Take 2 Tabs by mouth daily as needed for Diarrhea. LANTUS SOLOSTAR 100 unit/mL (3 mL) Inpn Generic drug:  insulin glargine 32 Units by SubCUTAneous route daily. levoFLOXacin 250 mg tablet Commonly known as:  Threasa Mince Take 1 Tab by mouth daily. D/V doxycyclin LOTREL 5-40 mg per capsule Generic drug:  amLODIPine-benazepril Take 1 Cap by mouth nightly. mirtazapine 7.5 mg tablet Commonly known as:  Ruth Kalyani Take 1 Tab by mouth nightly. pravastatin 10 mg tablet Commonly known as:  PRAVACHOL  
TAKE 1 TABLET BY MOUTH NIGHTLY. DISCONTINUE LIPITOR. PYRIDIUM PO Take 1 Tab by mouth daily as needed (UTI symptoms). traZODone 50 mg tablet Commonly known as:  DESYREL  
TAKE 1/2 TABLET BY MOUTH EVERY NIGHT Prescriptions Sent to Pharmacy Refills  
 levoFLOXacin (LEVAQUIN) 250 mg tablet 0 Sig: Take 1 Tab by mouth daily. D/V doxycyclin Class: Normal  
 Pharmacy: [facility] [address] [place] [address] Ph #: [phone] Route: Oral  
 mirtazapine (REMERON) 7.5 mg tablet 4 Sig: Take 1 Tab by mouth nightly. Class: Normal  
 Pharmacy: [facility] [address] Ph #: [phone] Route: Oral  
  
We Performed the Following CBC WITH AUTOMATED DIFF [75204 CPT(R)] Introducing [facility] [place] [facility] SERVICES! Dear Gary Reese: 
Thank you for requesting a [employer] account. Our records indicate that you already have an active [employer] account. You can access your account anytime at [email or web address] Did you know that you can access your hospital and ER discharge instructions at any time in [employer]? You can also review all of your test results from your hospital stay or ER visit. Additional Information If you have questions, please visit the Frequently Asked Questions section of the [employer] website at [email or web address] Remember, MyChart is NOT to be used for urgent needs. For medical emergencies, dial 911. Now available from your iPhone and Android! Please provide this summary of care documentation to your next provider. Your primary care clinician is listed as [facility]. If you have any questions after today's visit, please call [phone].

## 2017-10-05 NOTE — PROGRESS NOTES
HISTORY OF PRESENT ILLNESS  Reyna Bautista is a 80 y.o. female here accompanied by her daughter. She went to ER with blister on her LLE which popped. now she has cellulitis. received IN doxycyclin followed by PO doxycyclin. not improving much. had episode of confusion last night and felt dizzy. Bp is OK. 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 She is loosing weight.m,entions she is eating. Has HTN. on meds. doing well. She has diabetes. on insulin,need lab work. Altered mental status      Skin Infection   Associated symptoms include headaches. Diarrhea    Associated symptoms include headaches. Headache   Associated symptoms include headaches. Follow-up   Associated symptoms include headaches. Review of Systems   Constitutional: Negative. Negative for fever. Eyes: Negative. Respiratory: Negative. Cardiovascular: Negative. Gastrointestinal: Positive for diarrhea. Genitourinary: Positive for difficulty urinating. Musculoskeletal: Negative. Skin: Negative. Neurological: Positive for headaches. Psychiatric/Behavioral: The patient is nervous/anxious. Physical Exam   Constitutional: She appears well-developed and well-nourished. No distress. Neck: Normal range of motion. Neck supple. No JVD present. No thyromegaly present. Cardiovascular: Normal rate, regular rhythm, normal heart sounds and intact distal pulses. Pulmonary/Chest: Effort normal and breath sounds normal. No respiratory distress. She has no wheezes. Abdominal: Soft. Bowel sounds are normal. She exhibits no distension. There is no tenderness. KUB NT   Skin: There is erythema. LLE:aprox 2x2 cm size wound,improviong. redness and tenderness on leg,not better. Psychiatric: She has a normal mood and affect. Her behavior is normal.       ASSESSMENT and PLAN  Diagnoses and all orders for this visit:    1. Cellulitis of right lower extremity  On doxycyclin,not helping. wound size is better but not cellulitis. Will check,  -     CBC WITH AUTOMATED DIFF  -     levoFLOXacin (LEVAQUIN) 250 mg tablet; Take 1 Tab by mouth daily. D/C doxycyclin    2. Cerebrovascular accident (CVA) due to embolism of other precerebral artery (HCC)    Stable. on ASA. 3. FTT (failure to thrive) in adult    Loosing weight.eating OK. Will add,  -     mirtazapine (REMERON) 7.5 mg tablet; Take 1 Tab by mouth nightly. 4. Depression, unspecified depression type  -     mirtazapine (REMERON) 7.5 mg tablet; Take 1 Tab by mouth nightly. Discussed expected course/resolution/complications of diagnosis in detail with patient. Medication risks/benefits/costs/interactions/alternatives discussed with patient. Pt was given an after visit summary which includes diagnoses, current medications & vitals. Pt expressed understanding with the diagnosis and plan.

## 2017-10-09 NOTE — PROGRESS NOTES
Wbc WAS ELEVATED FOR CELLULITIS. ON LEVAQUIN NOW. Low vit D. Adv to take OTC vit D 2000 unit po every day for 4 months. Diabetes has improved.

## 2017-10-10 NOTE — TELEPHONE ENCOUNTER
Marty Redding called again to follow up on the hospice orders. I told her it had been sent to the nurse.    Marty Redding- [phone]  Hema Jorge [phone]

## 2017-10-11 NOTE — TELEPHONE ENCOUNTER
Writer placed call to pt deepak Myersomid Carrington and family would like hospice consult, conferred with MD and verbal order for hospice consult, [facility] hospice made aware and will initiate care.  Left message for deepak that all steps were done and hospice was to contact her back

## 2017-10-14 NOTE — ED NOTES
Shift report received from [address] RN. Assumed care of patient. Patient placed in position of comfort. Call bell in reach.

## 2017-10-14 NOTE — ED PROVIDER NOTES
[facility].  EMERGENCY DEPARTMENT HISTORY AND PHYSICAL EXAM       Date of Service: 10/14/2017   Patient Name: Lovely Rizzo   YOB: 1930  Medical Record Number: [id number]    History of Presenting Illness     Chief Complaint   Patient presents with    Dysarthria     patients daughter states that patient began having slurred speech 30 minutes ago. patient noted to have slow slurred speech in triage. daughter states patient is \"acting weird\" and states that this is not patients normal         History Provided By:  Daughter    Additional History:   Lovely Rizzo is a 80 y.o. female with PMhx significant for HTN, CAD (s/p CABG), CHF, CAD, CVA (with residual left sided deficits), NIDDM, Dementia, and A-fib who presents with family to the ED for evaluation of dysarthria and stroke like symptoms which began ~30 minutes PTA. Per daughter, pt began exhibiting difficulty speaking with slurred speech ~30 minutes PTA with associated eye \"glazing. \" She states that the pt appeared to slump over and appeared \"out of it\" but was still conscious for which she believed the pt may have been hypoglycemic, prompting her to provide the pt with a Coke while attempting to bring the pt to the ER. She states that upon arrival to the ER, pt seemed to improve back to her baseline, however, she states upon attempting to leave the pt began to exhibit gargled speech and had difficulty with word finding. She notes that the pt was complaining that she couldn't hold her neck up and was not very responsive at times, noting the pt then began complaining of chest tightness and SOB. Daughter notes that the pt has a hx of a prior CVA for which she has residual left sided deficits. The pt is currently on Pradaxa 150 mg BID. Additionally, daughter notes pt is currently finishing a round of Abx in treatment of leg cellulitis.  Pt does have a hx of dementia for which daughter notes pt has been increasingly emotional lately. She reports pt had a hospice assessment appointment this morning which may have increased the pt's anxiety and emotions. At time of evaluation, daughter notes it had been ~45 minutes total since onset of symptoms. She denies any N/V/D or further symptoms in the pt. Social Hx: - Tobacco, - EtOH, - Illicit Drugs    History of present illness limited secondary to pt's hx of dementia and on acuity of pt's condition in the ED.      Primary Care Provider: Shannan Major MD     Past History     Past Medical History:   Past Medical History:   Diagnosis Date    A-fib [facility])     per patient and daughter   Gardenia Hughes [facility])     Arthritis     Bladder infection, chronic     hx    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    third-way heart bypass    CAD (coronary artery disease)     Chronic UTI     Congestive heart failure, unspecified     Depression     Hypercholesterolemia     Hypertension     Hypokalemia     Insomnia     Macular degeneration     Moderate mitral regurgitation     Murmur     NIDDM     daily medicatons for diabetes    Skin cancer     Stroke ([facility] [address] [id number],    4 strokes and 1 TIA    Sun-damaged skin     Sunburn, blistering     Unspecified vitamin D deficiency         Past Surgical History:   Past Surgical History:   Procedure Laterality Date    HX CORONARY ARTERY BYPASS GRAFT  12/9/09    3v    HX CORONARY ARTERY BYPASS GRAFT      3 vessel    HX HAMMER TOE REPAIR      HX HYSTERECTOMY      at age 29    HX MOHS PROCEDURES Left 01/16/2017    SCC left medial calf by Dr. Sewell So      bladder repair    IMPLANT  LOOP RECORDER  7/15/2016         ORAL SURGERY PROCEDURE      implants        Family History:   Family History   Problem Relation Age of Onset    Diabetes Daughter     Heart Disease Daughter      stent placed    Stroke Daughter     Stroke Sister     Diabetes Mother         Social History:   Social History   Substance Use Topics    Smoking status: Never Smoker    Smokeless tobacco: Never Used    Alcohol use 0.0 oz/week     0 Standard drinks or equivalent per week      Comment: once a year        Allergies: Allergies   Allergen Reactions    Aleve [Naproxen Sodium] Other (comments)     Caused stomach ulcer and thrush    Eliquis [Apixaban] Other (comments)     dizziness    Labetalol Other (comments)     dizzyness    Metformin Diarrhea     With both IR and ER     Plavix [Clopidogrel] Other (comments)     Pt. Lynverenice Butt it was not effective         Review of Systems   Review of Systems   Unable to perform ROS: Acuity of condition (+dementia)     Physical Exam  Physical Exam   Constitutional: She appears well-developed and well-nourished. No distress. HENT:   Head: Normocephalic and atraumatic. Mouth/Throat: Oropharynx is clear and moist.   Eyes: Conjunctivae and EOM are normal.   Neck: Neck supple. No JVD present. No tracheal deviation present. Cardiovascular: Normal rate, regular rhythm and intact distal pulses. Exam reveals no gallop and no friction rub. No murmur heard. Pulmonary/Chest: Effort normal and breath sounds normal. No stridor. No respiratory distress. She has no wheezes. Abdominal: Soft. Bowel sounds are normal. She exhibits no distension and no mass. There is no tenderness. There is no guarding. Musculoskeletal: Normal range of motion. She exhibits no edema or tenderness. No deformity   Neurological:   Alert. Oriented x 2 (cannot remember year or president). Diminished sensation over left face and left arm. 5/5 muscle strength in right arm and right leg. 0/5 muscle strength in left arm and left leg. Chronic contractures of left hand. No facial droop or slurred speech. Skin: Skin is warm, dry and intact. No rash noted. Psychiatric: She has a normal mood and affect. Her behavior is normal. Judgment and thought content normal.   Nursing note and vitals reviewed.         Medical Decision Making   I am the first provider for this patient. I reviewed the vital signs, available nursing notes, past medical history, past surgical history, family history and social history. Old Medical Records: Old medical records. Nursing notes. Provider Notes:   DDx: CVA, TIA, electrolyte abnormality, uti    ED Course:  2:50 PM   Initial assessment performed. The patients presenting problems have been discussed, and family are in agreement with the care plan formulated and outlined with them. I have encouraged them to ask questions as they arise throughout their visit. Progress Notes:   Fay Echols  3/16/1930  Arrival time to ED: 2209 [employer]  Code S Called: 0359  Physician at Bedside: Laney Ceron 130 Time: 4974  ACT Page: [phone]  ACT Call Back: 1500    CONSULT NOTE:  3:02 PM  Genna Tillman DO spoke with Dr. Kevin Yanes,  Specialty: Neurology  Discussed pt's hx, disposition, and available diagnostic and imaging results. Reviewed care plans. Consultant recommends no tPA. He agrees with a CTA. Once the results come back, he asks that we call him back but anticipate admission for a stroke workup.     3:28 PM  Pt re-evaluated following return from CT. Family notes pt's symptoms are improving; pt is alert and speaking better, however, they state her speech is still slightly slurred and note that she is more confused than baseline (pt unable to name the current president or the year). Pt states that she is unable to lift her left arm and her left leg, stating she has tried to do so; daughter states this is worse from baseline, explaining pt is normally able to life the leg somewhat. CONSULT NOTE:   4:08 PM  Genna Tillman DO spoke with Dr. Ajay Nelson,   Specialty: Hospitalist  Discussed pt's hx, disposition, and available diagnostic and imaging results. Reviewed care plans. Consultant will evaluate pt for admission.     Diagnostic Study Results   Labs -   Recent Results (from the past 12 hour(s))   GLUCOSE, POC    Collection Time: 10/14/17  2:35 PM   Result Value Ref Range    Glucose (POC) 112 (H) 65 - 100 mg/dL    Performed by Dannielle Moon    CBC WITH AUTOMATED DIFF    Collection Time: 10/14/17  3:14 PM   Result Value Ref Range    WBC 11.0 3.6 - 11.0 K/uL    RBC 4.41 3.80 - 5.20 M/uL    HGB 13.5 11.5 - 16.0 g/dL    HCT 40.1 35.0 - 47.0 %    MCV 90.9 80.0 - 99.0 FL    MCH 30.6 26.0 - 34.0 PG    MCHC 33.7 30.0 - 36.5 g/dL    RDW 14.0 11.5 - 14.5 %    PLATELET 381 074 - 982 K/uL    NEUTROPHILS 76 (H) 32 - 75 %    LYMPHOCYTES 16 12 - 49 %    MONOCYTES 7 5 - 13 %    EOSINOPHILS 1 0 - 7 %    BASOPHILS 0 0 - 1 %    ABS. NEUTROPHILS 8.4 (H) 1.8 - 8.0 K/UL    ABS. LYMPHOCYTES 1.7 0.8 - 3.5 K/UL    ABS. MONOCYTES 0.8 0.0 - 1.0 K/UL    ABS. EOSINOPHILS 0.1 0.0 - 0.4 K/UL    ABS. BASOPHILS 0.0 0.0 - 0.1 K/UL   PROTHROMBIN TIME + INR    Collection Time: 10/14/17  3:14 PM   Result Value Ref Range    INR 1.5 (H) 0.9 - 1.1      Prothrombin time 15.5 (H) 9.0 - 22.1 sec   METABOLIC PANEL, COMPREHENSIVE    Collection Time: 10/14/17  3:14 PM   Result Value Ref Range    Sodium 139 136 - 145 mmol/L    Potassium 3.4 (L) 3.5 - 5.1 mmol/L    Chloride 105 97 - 108 mmol/L    CO2 25 21 - 32 mmol/L    Anion gap 9 5 - 15 mmol/L    Glucose 81 65 - 100 mg/dL    BUN 7 6 - 20 MG/DL    Creatinine 0.59 0.55 - 1.02 MG/DL    BUN/Creatinine ratio 12 12 - 20      GFR est AA >60 >60 ml/min/1.73m2    GFR est non-AA >60 >60 ml/min/1.73m2    Calcium 8.3 (L) 8.5 - 10.1 MG/DL    Bilirubin, total 0.3 0.2 - 1.0 MG/DL    ALT (SGPT) 22 12 - 78 U/L    AST (SGOT) 18 15 - 37 U/L    Alk.  phosphatase 139 (H) 45 - 117 U/L    Protein, total 6.5 6.4 - 8.2 g/dL    Albumin 3.4 (L) 3.5 - 5.0 g/dL    Globulin 3.1 2.0 - 4.0 g/dL    A-G Ratio 1.1 1.1 - 2.2     PTT    Collection Time: 10/14/17  3:14 PM   Result Value Ref Range    aPTT 47.6 (H) 22.1 - 32.5 sec    aPTT, therapeutic range     58.0 - 77.0 SECS   GLUCOSE, POC    Collection Time: 10/14/17  3:34 PM   Result Value Ref Range    Glucose (POC) 69 65 - 100 mg/dL    Performed by Rj Cortes    POC INR    Collection Time: 10/14/17  3:48 PM   Result Value Ref Range    INR (POC) 1.8 (H) <1.2     GLUCOSE, POC    Collection Time: 10/14/17  3:55 PM   Result Value Ref Range    Glucose (POC) 72 65 - 100 mg/dL    Performed by Angela Zuniga    POC [facility]'S    Collection Time: 10/14/17  4:05 PM   Result Value Ref Range    Calcium, ionized (POC) 1.18 1.12 - 1.32 MMOL/L    Sodium (POC) 143 136 - 145 MMOL/L    Potassium (POC) 3.4 (L) 3.5 - 5.1 MMOL/L    Chloride (POC) 104 98 - 107 MMOL/L    CO2 (POC) 24 21 - 32 MMOL/L    Anion gap (POC) 20 (H) 5 - 15 mmol/L    Glucose (POC) 68 65 - 100 MG/DL    BUN (POC) 6 (L) 9 - 20 MG/DL    Creatinine (POC) 0.5 (L) 0.6 - 1.3 MG/DL    GFRAA, POC >60 >60 ml/min/1.73m2    GFRNA, POC >60 >60 ml/min/1.73m2    Hemoglobin (POC) 16.0 11.5 - 16.0 GM/DL    Hematocrit (POC) 47 35.0 - 47.0 %    Comment Comment Not Indicated. Radiologic Studies -  The following have been ordered and reviewed:  CT Results  (Last 48 hours)               10/14/17 1453  CTA CODE NEURO HEAD AND NECK W CONT Preliminary result    Narrative:  **PRELIMINARY REPORT**       1. No evidence of large vessel occlusion. Mild narrowing in the origin of the   right MCA. Atherosclerotic disease in the carotid siphons, intracranial   vertebral arteries, carotid bulbs. The right vertebral artery is dominant. 2. Moderate-sized area of chronic encephalomalacia in the right parietal lobe. Chronic lacunar infarct in the right external capsule. Significant white matter   disease likely to chronic small vessel ischemic disease. 3. Multinodular goiter. Preliminary report was provided by Dr. Isatu Freedman, the on-call radiologist, at 3:20 PM   on 10/14/2017       Final report to follow. **END PRELIMINARY REPORT**                               10/14/17 1445  CT CODE NEURO HEAD WO CONTRAST Final result    Impression:  IMPRESSION: No significant interval change. Narrative:  EXAM:  CT CODE NEURO HEAD WO CONTRAST       INDICATION:   CVA       COMPARISON: 8/4/2016. CONTRAST:  None. TECHNIQUE: Unenhanced CT of the head was performed using 5 mm images. Brain and   bone windows were generated. CT dose reduction was achieved through use of a   standardized protocol tailored for this examination and automatic exposure   control for dose modulation. FINDINGS:   There is a moderate-sized of chronic encephalomalacia in the right parietal   lobe. There is a small chronic appearing infarct in the right insular cortex. There is moderate white matter disease likely to chronic small vessel ischemic   disease. Intracranial vascular calcifications are present. There is no   significant white matter disease. There is no intracranial hemorrhage,   extra-axial collection, mass, mass effect or midline shift. The basilar   cisterns are open. No definite acute infarct is identified. The bone windows   demonstrate no abnormalities. The visualized portions of the paranasal sinuses   and mastoid air cells are clear. Vital Signs-Reviewed the patient's vital signs. Patient Vitals for the past 12 hrs:   Temp Pulse Resp BP SpO2   10/14/17 1600 - 100 19 172/89 100 %   10/14/17 1545 - 87 16 176/77 100 %   10/14/17 1530 - 94 19 182/51 99 %   10/14/17 1527 - - - 178/78 -   10/14/17 1513 - 95 18 178/78 100 %   10/14/17 1510 97.7 °F (36.5 °C) (!) 110 18 - 100 %       Medications Given in the ED:  Medications   0.9% sodium chloride infusion (50 mL/hr IntraVENous New Bag 10/14/17 1453)   iopamidol (ISOVUE-370) 76 % injection 100 mL (100 mL IntraVENous Given 10/14/17 1453)   sodium chloride (NS) flush 10 mL (10 mL IntraVENous Given 10/14/17 1453)       Pulse Oximetry Analysis - Normal 100% on RA. Diagnosis   Clinical Impression:   1. Dysarthria    2.  Left-sided weakness         Plan:  1: Admit to Hospitalist    Disposition Note:  Admit Note:  4:08 PM  Patient is being admitted to the hospital by Dr. Juanita Kaplan. The results of their tests and reasons for their admission have been discussed with them and/or available family. They convey agreement and understanding for the need to be admitted and for their admission diagnosis. Consultation has been made with the inpatient physician specialist for hospitalization. _______________________________   Attestations: This note is prepared by Ryne Garner, acting as Scribe for ALEKSANDERMoanastacia Azul [employer] CoDO. ALEKSANDEREssex Hospitalpradeep [employer], DO: The scribe's documentation has been prepared under my direction and personally reviewed by me in its entirety.  I confirm that the note above accurately reflects all work, treatment, procedures, and medical decision making performed by me.  _______________________________

## 2017-10-14 NOTE — ED NOTES
Patient comes to the ED complaining of slurred speech and words that were not making sense. Patients daughter said that the symptoms started at 2 pm. Patients daughter says that when they arrived to the ED she was talking normal, but then afterwards she turned into slurred speech and word salad. Patients daughter states that she does have a history of Stroke.

## 2017-10-14 NOTE — IP AVS SNAPSHOT
[address] [facility] 
[phone] Patient: Angelita Nolasco MRN: [id number] RI:[id number] You are allergic to the following Allergen Reactions Aleve (Naproxen Sodium) Other (comments) Caused stomach ulcer and thrush Eliquis (Apixaban) Other (comments)  
 dizziness Labetalol Other (comments)  
 dizzyness Metformin Diarrhea With both IR and ER Plavix (Clopidogrel) Other (comments) Pt. Jenniferfrank Wills it was not effective Recent Documentation Height Weight BMI OB Status Smoking Status 1.524 m 55.2 kg 23.77 kg/m2 Hysterectomy Never Smoker Emergency Contacts Name Discharge Info Relation Home Work Mobile Page,Roseanna DISCHARGE CAREGIVER [3] Child [2] [phone] [address] CAREGIVER [3] Child [2] [phone] About your hospitalization You were admitted on:  October 14, 2017 You last received care in the:  hospitals [facility]LE[facility] You were discharged on:  October 16, 2017 Unit phone number:  [phone] Why you were hospitalized Your primary diagnosis was:  Not on File Your diagnoses also included:  Cva (Cerebral Vascular Accident) (Hcc) Providers Seen During Your Hospitalizations Provider Role Specialty Primary office phone Gisella Millard DO Attending Provider Emergency Medicine [phone] Red Trotter MD Attending Provider Internal Medicine [phone] Taras Rios MD Attending Provider Internal Medicine [phone] Your Primary Care Physician (PCP) Primary Care Physician Office Phone Office Fax [address], [address] [address] [phone] Follow-up Information Follow up With Details Comments Contact Info Rodolfo Huffman MD On 10/18/2017 3;00pm  routine hospital follow up and UTI follow up [address] [place] [address] 
[phone] Nia Moore NP On 11/30/2017 11;30am  Neurology hospital follow-up [address] [facility] [address] [facility] 
[phone] Your Appointments Wednesday October 18, 2017  3:00 PM EDT ROUTINE CARE with Young Centeno MD  
[facility] Internal Medicine [facility] [address] [facility] [address]  
[phone] Tuesday October 24, 2017 11:30 AM EDT Office Visit with Kelvin Dempsey NP [facility] [address] [facility]  
[phone] Thursday November 30, 2017 11:30 AM EST New Patient with Nia Moore NP [facility] [address] [place] [address], 
[address] [place] [address], [address] [facility]  
[phone] Current Discharge Medication List  
  
START taking these medications Dose & Instructions Dispensing Information Comments Morning Noon Evening Bedtime  
 aspirin delayed-release 81 mg tablet Your last dose was: Your next dose is:    
   
   
 Dose:  81 mg Take 1 Tab by mouth daily for 30 days. Quantity:  30 Tab Refills:  0  
     
   
   
   
  
 ciprofloxacin HCl 250 mg tablet Commonly known as:  CIPRO Your last dose was: Your next dose is:    
   
   
 Dose:  250 mg Take 1 Tab by mouth two (2) times a day for 5 days. Quantity:  10 Tab Refills:  0  
     
   
   
   
  
 fluconazole 200 mg tablet Commonly known as:  DIFLUCAN Your last dose was: Your next dose is:    
   
   
 Dose:  200 mg Take 1 Tab by mouth daily for 7 days. FDA advises cautious prescribing of oral fluconazole in pregnancy. Quantity:  7 Tab Refills:  0 CONTINUE these medications which have CHANGED Dose & Instructions Dispensing Information Comments Morning Noon Evening Bedtime insulin glargine 100 unit/mL (3 mL) Inpn Commonly known as:  LANMOHANUS MARINELLIKELLEY What changed:  how much to take Your last dose was: Your next dose is:    
   
   
 Dose:  25 Units 25 Units by SubCUTAneous route daily. Quantity:  15 mL Refills:  0 CONTINUE these medications which have NOT CHANGED Dose & Instructions Dispensing Information Comments Morning Noon Evening Bedtime  
 acetaminophen 650 mg CR tablet Commonly known as:  TYLENOL ARTHRITIS PAIN Your last dose was: Your next dose is:    
   
   
 Dose:  650 mg Take 1 Tab by mouth two (2) times daily as needed for Pain. Quantity:  30 Tab Refills:  5  
     
   
   
   
  
 calcium carbonate 200 mg calcium (500 mg) Chew Commonly known as:  TUMS Your last dose was: Your next dose is:    
   
   
 Dose:  1 Tab Take 1 Tab by mouth two (2) times daily as needed. Quantity:  60 Tab Refills:  3  
     
   
   
   
  
 dabigatran etexilate 150 mg capsule Commonly known as:  PRADAXA Your last dose was: Your next dose is: TAKE ONE CAPSULE BY MOUTH EVERY 12 HOURS FOR CEREBROVASCULAR ACCIDENT Quantity:  60 Cap Refills:  3 IMODIUM PO Your last dose was: Your next dose is:    
   
   
 Dose:  2 Tab Take 2 Tabs by mouth daily as needed for Diarrhea. Refills:  0 LOTREL 5-40 mg per capsule Generic drug:  amLODIPine-benazepril Your last dose was: Your next dose is:    
   
   
 Dose:  1 Cap Take 1 Cap by mouth nightly. Refills:  0  
     
   
   
   
  
 pravastatin 10 mg tablet Commonly known as:  PRAVACHOL Your last dose was: Your next dose is: TAKE 1 TABLET BY MOUTH NIGHTLY. DISCONTINUE LIPITOR. Quantity:  90 Tab Refills:  5  
 **Patient requests 90 days supply**  PYRIDIUM PO  
   
 Your last dose was: Your next dose is:    
   
   
 Dose:  1 Tab Take 1 Tab by mouth daily as needed (UTI symptoms). Refills:  0  
     
   
   
   
  
 traZODone 50 mg tablet Commonly known as:  Dimple Brito Your last dose was: Your next dose is: TAKE 1/2 TABLET BY MOUTH EVERY NIGHT Quantity:  30 Tab Refills:  0 STOP taking these medications   
 doxycycline 100 mg capsule Commonly known as:  MONODOX  
   
  
 levoFLOXacin 250 mg tablet Commonly known as:  LEVAQUIN  
   
  
 mirtazapine 7.5 mg tablet Commonly known as:  Garret Curran Where to Get Your Medications Information on where to get these meds will be given to you by the nurse or doctor. ! Ask your nurse or doctor about these medications  
  aspirin delayed-release 81 mg tablet  
 ciprofloxacin HCl 250 mg tablet  
 fluconazole 200 mg tablet  
 insulin glargine 100 unit/mL (3 mL) Inpn Discharge Instructions HOSPITALIST DISCHARGE INSTRUCTIONS 
 
NAME: Lovely Rizzo :  3/16/1930 MRN:  [id number] Date/Time:  10/16/2017 11:18 AM 
 
ADMIT DATE: 10/14/2017 DISCHARGE DATE: 10/16/2017 Attending Physician: Diaz Zaldivar MD 
 
DISCHARGE DIAGNOSIS: 
Stuttering TIA in setting of dementia, severe cerebrovascular disease and multiple prior CVAs, still not at baseline E Coli UTI, present on admission Insulin-dependent DM2 controlled CAD s/p CABG, chronic diastolic CHF, paroxysmal afib and benign HTN Dementia with depression Medications: Per above medication reconciliation. Pain Management: per above medications Recommended diet: Diabetic Diet Recommended activity: Activity as tolerated, PT/OT Eval and Treat and fall precautions Wound care: None Indwelling devices:  None Supplemental Oxygen: None Required Lab work: None Glucose management:  Accucheck ACHS with sliding scale per SNF protocol Code status: DNR Other:  Do not take remeron (mirtazepine) any more. Outside physician follow up: Follow-up Information Follow up With Details Comments Contact Info Norma Barkley MD In 1 week routine hospital follow up and UTI follow up [address] 
[phone] Concha Salinas MD In 1 month As needed for your TIA [facility] [address] [facility] 
[phone] Skilled nursing facility/ SNF MD responsible for above on discharge. Information obtained by : 
I understand that if any problems occur once I am at home I am to contact my physician. I understand and acknowledge receipt of the instructions indicated above. Physician's or R.N.'s Signature                                                                  Date/Time Patient or Representative Discharge Orders None ACO Transitions of Care Introducing [employer] [address] Margie Jin offers a voluntary care coordination program to provide high quality service and care to [facility] fee-for-service beneficiaries. Roger Morgan was designed to help you enhance your health and well-being through the following services: ? Transitions of Care  support for individuals who are transitioning from one care setting to another (example: Hospital to home). ? Chronic and Complex Care Coordination  support for individuals and caregivers of those with serious or chronic illnesses or with more than one chronic (ongoing) condition and those who take a number of different medications. If you meet specific medical criteria, a [facility] [address] may call you directly to coordinate your care with your primary care physician and your other care providers. For questions about the [facility] programs, please, contact your physicians office. For general questions or additional information about Accountable Care Organizations: 
Please visit www.medicare.gov/acos. html or call 1-800-MEDICARE (6-100.921.5316) TTY users should call 5-322.405.3665. [email or web address] Announcement We are excited to announce that we are making your provider's discharge notes available to you in [email or web address]. You will see these notes when they are completed and signed by the physician that discharged you from your recent hospital stay. If you have any questions or concerns about any information you see in [email or web address], please call the Health Information Department where you were seen or reach out to your Primary Care Provider for more information about your plan of care. Introducing [facility]! Dear Gary Reese: 
Thank you for requesting a [email or web address] account. Our records indicate that you already have an active [email or web address] account. You can access your account anytime at [email or web address] Did you know that you can access your hospital and ER discharge instructions at any time in [email or web address]? You can also review all of your test results from your hospital stay or ER visit. Additional Information If you have questions, please visit the Frequently Asked Questions section of the [email or web address] website at [email or web address] Remember, [email or web address] is NOT to be used for urgent needs. For medical emergencies, dial 911. Now available from your iPhone and Android! General Information Please provide this summary of care documentation to your next provider.  
  
  
    
    
 Patient Signature: ____________________________________________________________ Date:  ____________________________________________________________  
  
Yenny Moulds Provider Signature:  ____________________________________________________________ Date:  ____________________________________________________________

## 2017-10-14 NOTE — H&P
Hospitalist Admission Note    NAME: Fredericka Kayser   :  3/16/1930   MRN:  [id number]     Date/Time:  10/14/2017 7:14 PM    Patient PCP: Mak Nava MD  ________________________________________________________________________    My assessment of this patient's clinical condition and my plan of care is as follows. Assessment / Plan:  CVA vs TIA  -pt with hx of CVA with L sided residual weakness  -admit to monitored bed  -neuro checks per protocol  -elevated head of bed  -EKG  -lipid panel, TSH, A1C 6.9 on 10/2017  -allow for permissive hypertension with labetalol prn for BP > 220/120  -CT head unremarkable, CTA with no significant cervical carotid arterial stenosis. -check Echo, MRI head  -pt failed ASA/plavix/Eliquis in the past  -will resume Pradaxa, statin  -PT/OT consult  -neurology consulted    Hypokalemia  -K3.4, will replete    T2DM  -lantus at lower dose, titrate prn  -SSI    CAD s/p CABG  HTN  Chronic diastolic HF  Atrial fib  -currently on Pradaxa  -hold antihypertensives for     Depression  -family states pt had been off Remeron  -monitor for now, deneis SI/HI    Wound on L leg  -completed abx prior to admission  -does not appear infected    Code Status: DNR  Surrogate Decision Maker: daughter Pippa King    DVT Prophylaxis: on pradaxa   GI Prophylaxis: not indicated    Baseline: wheelchair bound        Subjective:   CHIEF COMPLAINT: slurred speech    HISTORY OF PRESENT ILLNESS:     Jayant Arenas is a 80 y.o.  female with pmhx significant for HTN, CAD (s/p CABG), CHF, CAD, CVA (with residual left sided deficits), NIDDM, Dementia, and A-fib, presen tto ED for further evaluation of dysarthria and cva like symptoms. Per daughter, symptoms occurred approx 1 hr prior to admission. Pt was apparently having a difficult time calling her daughter's name.   Initially, daughter thought pt was hypoglycemic, so she gave pt some Coke, however symptoms persisted with dysarthria and episodes of \"starring\", which eventually led her to the ED for further evaluation. Of note, family states pt had failed ASA/plavix/and Eliquis int he past.  She was transitioned to Pradaxa and had remained stable on it. In the ER, pt was conversant, although was intermittently confused. Vitals: T97.7, P 110, /78, SpO2 100% on RA  Labs reviewed. CT head neg. CTA with no significant cervical carotid arterial stenosis. We were asked to admit for work up and evaluation of the above problems.      Past Medical History:   Diagnosis Date    A-fib [facility])     per patient and daughter   [place] Been [facility])     Arthritis     Bladder infection, chronic     hx    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    third-way heart bypass    CAD (coronary artery disease)     Chronic UTI     Congestive heart failure, unspecified     Depression     Hypercholesterolemia     Hypertension     Hypokalemia     Insomnia     Macular degeneration     Moderate mitral regurgitation     Murmur     NIDDM     daily medicatons for diabetes    Skin cancer     Stroke ([facility] [id number],    4 strokes and 1 TIA    Sun-damaged skin     Sunburn, blistering     Unspecified vitamin D deficiency         Past Surgical History:   Procedure Laterality Date    HX CORONARY ARTERY BYPASS GRAFT  12/9/09    3v    HX CORONARY ARTERY BYPASS GRAFT      3 vessel    HX HAMMER TOE REPAIR      HX HYSTERECTOMY      at age 29    HX MOHS PROCEDURES Left 01/16/2017    SCC left medial calf by Dr. Linda Virgen      bladder repair    IMPLANT  LOOP RECORDER  7/15/2016         ORAL SURGERY PROCEDURE      implants       Social History   Substance Use Topics    Smoking status: Never Smoker    Smokeless tobacco: Never Used    Alcohol use 0.0 oz/week     0 Standard drinks or equivalent per week      Comment: once a year        Family History   Problem Relation Age of Onset    Diabetes Daughter     Heart Disease Daughter      stent placed    Stroke Daughter     Stroke Sister     Diabetes Mother      Allergies   Allergen Reactions    Aleve [Naproxen Sodium] Other (comments)     Caused stomach ulcer and thrush    Eliquis [Apixaban] Other (comments)     dizziness    Labetalol Other (comments)     dizzyness    Metformin Diarrhea     With both IR and ER     Plavix [Clopidogrel] Other (comments)     Pt. Said it was not effective        Prior to Admission medications    Medication Sig Start Date End Date Taking? Authorizing Provider   doxycycline (MONODOX) 100 mg capsule Take 100 mg by mouth two (2) times a day. Yes Historical Provider   mirtazapine (REMERON) 7.5 mg tablet Take 1 Tab by mouth nightly. 10/5/17  Yes Willie Hurtado MD   levoFLOXacin (LEVAQUIN) 250 mg tablet Take 1 Tab by mouth daily. D/V doxycyclin 10/5/17  Yes Willie Hurtado MD   amLODIPine-benazepril (LOTREL) 5-40 mg per capsule Take 1 Cap by mouth nightly. Yes Historical Provider   LOPERAMIDE HCL (IMODIUM PO) Take 2 Tabs by mouth daily as needed for Diarrhea. Yes Historical Provider   insulin glargine (LANTUS SOLOSTAR) 100 unit/mL (3 mL) inpn 32 Units by SubCUTAneous route daily. Yes Historical Provider   traZODone (DESYREL) 50 mg tablet TAKE 1/2 TABLET BY MOUTH EVERY NIGHT 7/26/17  Yes Tiarra Menjivar NP   dabigatran etexilate (PRADAXA) 150 mg capsule TAKE ONE CAPSULE BY MOUTH EVERY 12 HOURS FOR CEREBROVASCULAR ACCIDENT 6/30/17  Yes Willie Hurtado MD   pravastatin (PRAVACHOL) 10 mg tablet TAKE 1 TABLET BY MOUTH NIGHTLY. DISCONTINUE LIPITOR. 12/1/16  Yes Willie Hurtado MD   acetaminophen (TYLENOL ARTHRITIS PAIN) 650 mg CR tablet Take 1 Tab by mouth two (2) times daily as needed for Pain. 10/24/16  Yes Willie Hurtado MD   calcium carbonate (TUMS) 200 mg calcium (500 mg) chew Take 1 Tab by mouth two (2) times daily as needed. 10/24/16  Yes Willie Hurtado MD   PHENAZOPYRIDINE HCL (PYRIDIUM PO) Take 1 Tab by mouth daily as needed (UTI symptoms).     Historical Provider       REVIEW OF SYSTEMS: I am not able to complete the review of systems because: The patient is intubated and sedated    The patient has altered mental status due to his acute medical problems    The patient has baseline aphasia from prior stroke(s)   x The patient has baseline dementia and is not reliable historian    The patient is in acute medical distress and unable to provide information           Total of 12 systems reviewed as follows:       POSITIVE= BOLD text  Negative = text not underlined  General:  fever, chills, sweats, generalized weakness, weight loss/gain,      loss of appetite   Eyes:    blurred vision, eye pain, loss of vision, double vision  ENT:    rhinorrhea, pharyngitis   Respiratory:   cough, sputum production, SOB, SANDOVAL, wheezing, pleuritic pain   Cardiology:   chest pain, palpitations, orthopnea, PND, edema, syncope   Gastrointestinal:  abdominal pain , N/V, diarrhea, dysphagia, constipation, bleeding   Genitourinary:  frequency, urgency, dysuria, hematuria, incontinence   Muskuloskeletal :  arthralgia, myalgia, back pain  Hematology:  easy bruising, nose or gum bleeding, lymphadenopathy   Dermatological: rash, ulceration, pruritis, color change / jaundice  Endocrine:   hot flashes or polydipsia   Neurological:  headache, dizziness, confusion, focal weakness, paresthesia,     Speech difficulties, memory loss, gait difficulty  Psychological: Feelings of anxiety, depression, agitation    Objective:   VITALS:    Visit Vitals    /62    Pulse 82    Temp 97.7 °F (36.5 °C)    Resp 23    Ht 5' (1.524 m)    Wt 56.5 kg (124 lb 9 oz)    SpO2 100%    BMI 24.33 kg/m2       PHYSICAL EXAM:    General:    Female in bed, NAD   HEENT: Atraumatic, anicteric sclerae, pink conjunctivae     No oral ulcers, mucosa moist, throat clear, dentition fair  Neck:  Supple, symmetrical,  thyroid: non tender  Lungs:   Clear to auscultation bilaterally. No Wheezing or Rhonchi. No rales.   Chest wall:  No tenderness  No Accessory muscle use. Heart:   Regular  rhythm,  No  murmur   Edema on L leg  Abdomen:   Soft, non-tender. Not distended. Bowel sounds normal  Extremities: No cyanosis. No clubbing,      Skin turgor normal, Capillary refill normal, Radial dial pulse 2+  Skin:     Wound on L leg, does not appear to be infected    Not pale. Not Jaundiced  No rashes   Psych:  Not anxious or agitated. Neurologic: AAOx2, conversant, but intermittently confused (baseline per family). L sided weakness/stiff/contractures of left hand. Speech is normal.     _______________________________________________________________________  Care Plan discussed with:    Comments   Patient x    Family  x daughter   RN x    Care Manager                    Consultant:  x ED physician   _______________________________________________________________________  Expected  Disposition:   Home with Family    HH/PT/OT/RN x   SNF/LTC    MARK    ________________________________________________________________________  TOTAL TIME:  72 Minutes    Critical Care Provided     Minutes non procedure based      Comments    x Reviewed previous records   >50% of visit spent in counseling and coordination of care x Discussion with patient and/or family and questions answered       ________________________________________________________________________  Signed: Audrey Cronin MD    Procedures: see electronic medical records for all procedures/Xrays and details which were not copied into this note but were reviewed prior to creation of Plan.     LAB DATA REVIEWED:    Recent Results (from the past 24 hour(s))   GLUCOSE, POC    Collection Time: 10/14/17  2:35 PM   Result Value Ref Range    Glucose (POC) 112 (H) 65 - 100 mg/dL    Performed by Daniel Mccarty    CBC WITH AUTOMATED DIFF    Collection Time: 10/14/17  3:14 PM   Result Value Ref Range    WBC 11.0 3.6 - 11.0 K/uL    RBC 4.41 3.80 - 5.20 M/uL    HGB 13.5 11.5 - 16.0 g/dL    HCT 40.1 35.0 - 47.0 %    MCV 90.9 80.0 - 99.0 FL    MCH 30.6 26.0 - 34.0 PG    MCHC 33.7 30.0 - 36.5 g/dL    RDW 14.0 11.5 - 14.5 %    PLATELET 795 035 - 394 K/uL    NEUTROPHILS 76 (H) 32 - 75 %    LYMPHOCYTES 16 12 - 49 %    MONOCYTES 7 5 - 13 %    EOSINOPHILS 1 0 - 7 %    BASOPHILS 0 0 - 1 %    ABS. NEUTROPHILS 8.4 (H) 1.8 - 8.0 K/UL    ABS. LYMPHOCYTES 1.7 0.8 - 3.5 K/UL    ABS. MONOCYTES 0.8 0.0 - 1.0 K/UL    ABS. EOSINOPHILS 0.1 0.0 - 0.4 K/UL    ABS. BASOPHILS 0.0 0.0 - 0.1 K/UL   PROTHROMBIN TIME + INR    Collection Time: 10/14/17  3:14 PM   Result Value Ref Range    INR 1.5 (H) 0.9 - 1.1      Prothrombin time 15.5 (H) 9.0 - 40.3 sec   METABOLIC PANEL, COMPREHENSIVE    Collection Time: 10/14/17  3:14 PM   Result Value Ref Range    Sodium 139 136 - 145 mmol/L    Potassium 3.4 (L) 3.5 - 5.1 mmol/L    Chloride 105 97 - 108 mmol/L    CO2 25 21 - 32 mmol/L    Anion gap 9 5 - 15 mmol/L    Glucose 81 65 - 100 mg/dL    BUN 7 6 - 20 MG/DL    Creatinine 0.59 0.55 - 1.02 MG/DL    BUN/Creatinine ratio 12 12 - 20      GFR est AA >60 >60 ml/min/1.73m2    GFR est non-AA >60 >60 ml/min/1.73m2    Calcium 8.3 (L) 8.5 - 10.1 MG/DL    Bilirubin, total 0.3 0.2 - 1.0 MG/DL    ALT (SGPT) 22 12 - 78 U/L    AST (SGOT) 18 15 - 37 U/L    Alk.  phosphatase 139 (H) 45 - 117 U/L    Protein, total 6.5 6.4 - 8.2 g/dL    Albumin 3.4 (L) 3.5 - 5.0 g/dL    Globulin 3.1 2.0 - 4.0 g/dL    A-G Ratio 1.1 1.1 - 2.2     PTT    Collection Time: 10/14/17  3:14 PM   Result Value Ref Range    aPTT 47.6 (H) 22.1 - 32.5 sec    aPTT, therapeutic range     58.0 - 77.0 SECS   GLUCOSE, POC    Collection Time: 10/14/17  3:34 PM   Result Value Ref Range    Glucose (POC) 69 65 - 100 mg/dL    Performed by Karen Cornejo    POC INR    Collection Time: 10/14/17  3:48 PM   Result Value Ref Range    INR (POC) 1.8 (H) <1.2     GLUCOSE, POC    Collection Time: 10/14/17  3:55 PM   Result Value Ref Range    Glucose (POC) 72 65 - 100 mg/dL    Performed by Princess Common    POC [facility]    Collection Time: 10/14/17  4:05 PM   Result Value Ref Range    Calcium, ionized (POC) 1.18 1.12 - 1.32 MMOL/L    Sodium (POC) 143 136 - 145 MMOL/L    Potassium (POC) 3.4 (L) 3.5 - 5.1 MMOL/L    Chloride (POC) 104 98 - 107 MMOL/L    CO2 (POC) 24 21 - 32 MMOL/L    Anion gap (POC) 20 (H) 5 - 15 mmol/L    Glucose (POC) 68 65 - 100 MG/DL    BUN (POC) 6 (L) 9 - 20 MG/DL    Creatinine (POC) 0.5 (L) 0.6 - 1.3 MG/DL    GFRAA, POC >60 >60 ml/min/1.73m2    GFRNA, POC >60 >60 ml/min/1.73m2    Hemoglobin (POC) 16.0 11.5 - 16.0 GM/DL    Hematocrit (POC) 47 35.0 - 47.0 %    Comment Comment Not Indicated.      GLUCOSE, POC    Collection Time: 10/14/17  4:11 PM   Result Value Ref Range    Glucose (POC) 92 65 - 100 mg/dL    Performed by Eric Walker    URINALYSIS W/ REFLEX CULTURE    Collection Time: 10/14/17  5:12 PM   Result Value Ref Range    Color YELLOW/STRAW      Appearance CLOUDY (A) CLEAR      Specific gravity 1.005 1.003 - 1.030      pH (UA) 7.0 5.0 - 8.0      Protein NEGATIVE  NEG mg/dL    Glucose NEGATIVE  NEG mg/dL    Ketone NEGATIVE  NEG mg/dL    Bilirubin NEGATIVE  NEG      Blood TRACE (A) NEG      Urobilinogen 0.2 0.2 - 1.0 EU/dL    Nitrites NEGATIVE  NEG      Leukocyte Esterase LARGE (A) NEG      WBC 20-50 0 - 4 /hpf    RBC 5-10 0 - 5 /hpf    Epithelial cells FEW FEW /lpf    Bacteria 4+ (A) NEG /hpf    UA:UC IF INDICATED URINE CULTURE ORDERED (A) CNI

## 2017-10-14 NOTE — ED NOTES
Bedside and Verbal shift change report given to Philipp Wade RN (oncoming nurse) by Taj Costa RN (offgoing nurse). Report included the following information SBAR, Kardex, ED Summary, MAR, Recent Results and Med Rec Status.

## 2017-10-14 NOTE — PROGRESS NOTES
Pharmacy  [facility] Monitoring     Enoxaparin Indication:A Fib, CVA  Current Dose: 40 mg sc qam  Creatinine Clearance: 48.3 ml/hr  Recent Labs      10/14/17   1514   CREA  0.59   BUN  7   HGB  13.5   PLT  180     Wt Readings from Last 1 Encounters:   10/14/17 56.5 kg (124 lb 9 oz)   Body mass index is 24.33 kg/(m^2). Impression/Plan: will change dose to 25 mg sc daily     Yonas,  Mis Boles, [facility]    Adjust dose for creatinine clearance and BMI (overweight and underweight) using the table below. Use weight adjusted rounded doses as approved by P&T/[facility]. Renal dosing includes therapeutic doses. Use once daily doses for DVT/PE patients as approved by P&T/[facility] as noted below. See website for detailed information Website       Enoxaparin dosing for inpatient treatment of acute DVT with or without pulmonary embolism of 1 mg/kg every 12 hours will be automatically changed to 1.5 mg/kg once daily except for patients with any of the following:  Cancer   Body Mass Index greater than 30 kg/M2   Weight greater than 148 kg   Critical Care Patients   Creatinine clearance less than 30 ml/min   For ED patients wait until they are admitted before converting to 1.5 mg/kg once daily to ensure proper patient selection per criteria above. PT/[facility]EC Approved Dosing of Lovenox  1. Automatic dosage adjustment for therapeutic doses. Open monitoring I-vents for therapeutic doses. Order serum creatinine every other day if not already ordered. 2. DVT prophylaxis in morbidly obese patients (BMI > 40),  Call physician and recommend 40 mg Q12H creatinine clearance > 30 ml/min. Day shift will call MD. If this occurs on an evening shift/night shift, only call if the physician is a hospitalist, otherwise leave for day shift to clarify. Open and close the I-vent.   3. For automatic renal dosing for DVT prophylaxis (40 mg to 30 mg and vice versa), we will open and close an I-vent (using anticoagulation as the category and dosing as subtype) and enter a progress note. No creatinine or Hgb monitoring. 4. For automatic dosing for DVT prophylaxis for low weight patients (wt < 60 kg) - 0.5 mg/kg, open and close an I-vent (using anticoagulation as the category and dosing as subtype) and enter a progress note. Please use the 60 mg syringe for doses of enoxaparin under 30 mg as the syringe has incremental dose markings. The nurse can expel the excess drug to obtain the proper dose without transferring the drug to another syringe. During order verification hover over the order title and press the shift pace and left click to edit the name to Enoxaparin xx mg Partial Dose. This will alert the nurse that a partial dose is to be administered. 5. For automatic dosing of Lovenox, contact physician if indication is unclear. Use professional judgment, contact physician if there are any questions concerning physicians intention.

## 2017-10-14 NOTE — PROGRESS NOTES
Spiritual Care Assessment/Progress Notes    Julio Santo [id number]  [id number]    3/16/1930  80 y.o.  female    Patient Telephone Number: [phone] (home)   Holiness Affiliation: Maile Payer   Language: English   Extended Emergency Contact Information  Primary Emergency Contact: Liz Phone: [phone]  Relation: Child  Secondary Emergency Contact: [place] Phone: [phone]  Relation: Child   Patient Active Problem List    Diagnosis Date Noted    S/P CABG (coronary artery bypass graft) 3 vessel 09/22/2016    Macular degeneration 09/22/2016    [facility]     Status post placement of implantable loop recorder 07/15/2016    Tricuspid valve insufficiency 07/14/2016    Mitral valve insufficiency 07/14/2016    CVA (cerebral vascular accident) ([facility] 05/06/2016    PUD (peptic ulcer disease)     Advance care planning 01/28/2016    Unspecified vitamin D deficiency 08/21/2015    TIA (transient ischemic attack) 08/19/2013    Cancer of skin, squamous cell 07/02/2013    Occlusion and stenosis of carotid artery without mention of cerebral infarction 11/27/2012    Edema 11/07/2012    Osteoarthrosis, unspecified whether generalized or localized, unspecified site 05/31/2011    Pain in joint, shoulder region 05/31/2011    Coronary atherosclerosis of native coronary artery 09/07/2010    Essential hypertension, benign 06/01/2010    Pure hypercholesterolemia 06/01/2010    DM type 2 (diabetes mellitus, type 2) ([facility] 06/01/2010    Acute upper respiratory infections of unspecified site 06/01/2010        Date: 10/14/2017       Level of Holiness/Spiritual Activity:  []         Involved in shailesh tradition/spiritual practice    []         Not involved in shailesh tradition/spiritual practice  [x]         Spiritually oriented    []         Claims no spiritual orientation    []         seeking spiritual identity  []         Feels alienated from Worship practice/tradition  []         Feels angry about Worship practice/tradition  [x]         Spirituality/Worship tradition is a resource for coping at this time. []         Not able to assess due to medical condition    Services Provided Today:  []         crisis intervention    []         reading Scriptures  [x]         spiritual assessment    []         prayer  [x]         empathic listening/emotional support  []         rites and rituals (cite in comments)  []         life review     []         Worship support  []         theological development   []         advocacy  []         ethical dialog     []         blessing  []         bereavement support    [x]         support to family  []         anticipatory grief support   []         help with AMD  []         spiritual guidance    []         meditation      Spiritual Care Needs  []         Emotional Support  []         Spiritual/Sabianist Care  []         Loss/Adjustment  []         Advocacy/Referral                /Ethics  [x]         No needs expressed at               this time  []         Other: (note in               comments)  [address]  []         Follow up visits with               pt/family  []         Provide materials  []         Schedule sacraments  []         Contact Community               Clergy  [x]         Follow up as needed  []         Other: (note in               comments)     Comments:   Responded to code stroke in ER. Patient's daughter and granddaughter were present. Provided listening presence as daughter spoke briefly about patient's recent medical history. Patient lives in a group home. Patient returned to room from CT, alert and responding to nurses. Family seems to be coping well at this time. Advised them of  availability as needed/requested.   SHARI Desouza, [facility], [address]    [address] Paging Service  [phone]

## 2017-10-15 NOTE — ROUTINE PROCESS
* No surgery found *  * No surgery found *  Bedside and Verbal shift change report given to Yahir Fiore RN (oncoming nurse) by Dilma Singh RN (offgoing nurse). Report included the following information SBAR, Kardex, ED Summary and Recent Results.     [facility]:1259      Significant changes during shift:  Admit in progress, will need family to complete MRI checklist and database        Patient Information    Neto Perry  80 y.o.  10/14/2017  2:38 PM by Nickie Salcedo MD. Neto Perry was admitted from ED    Problem List    Patient Active Problem List    Diagnosis Date Noted    S/P CABG (coronary artery bypass graft) 3 vessel 09/22/2016    Macular degeneration 09/22/2016    St[facility] ([facility]     Status post placement of implantable loop recorder 07/15/2016    Tricuspid valve insufficiency 07/14/2016    Mitral valve insufficiency 07/14/2016    CVA (cerebral vascular accident) ([facility] 05/06/2016    PUD (peptic ulcer disease)     Advance care planning 01/28/2016    Unspecified vitamin D deficiency 08/21/2015    TIA (transient ischemic attack) 08/19/2013    Cancer of skin, squamous cell 07/02/2013    Occlusion and stenosis of carotid artery without mention of cerebral infarction 11/27/2012    Edema 11/07/2012    Osteoarthrosis, unspecified whether generalized or localized, unspecified site 05/31/2011    Pain in joint, shoulder region 05/31/2011    Coronary atherosclerosis of native coronary artery 09/07/2010    Essential hypertension, benign 06/01/2010    Pure hypercholesterolemia 06/01/2010    DM type 2 (diabetes mellitus, type 2) ([facility] 06/01/2010    Acute upper respiratory infections of unspecified site 06/01/2010     Past Medical History:   Diagnosis Date    A-fib [facility])     per patient and daughter   Gabriele Méndez [facility])     Arthritis     Bladder infection, chronic     hx    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    third-way heart bypass    CAD (coronary artery disease)     Chronic UTI     Congestive heart failure, unspecified     Depression     Hypercholesterolemia     Hypertension     Hypokalemia     Insomnia     Macular degeneration     Moderate mitral regurgitation     Murmur     NIDDM     daily medicatons for diabetes    Skin cancer     [facility] [phone],    4 strokes and 1 TIA    Sun-damaged skin     Sunburn, blistering     Unspecified vitamin D deficiency          Core Measures:    CVA: Yes Yes  CHF:No No  PNA:No No    Post Op Surgical (If Applicable): Activity Status:    OOB to Chair No  Ambulated this shift No   Bed Rest Yes    Supplemental O2: (If Applicable)    NC No  NRB No  Venti-mask No  On 0 Liters/min      LINES AND DRAINS:    PIV only    DVT prophylaxis:    DVT prophylaxis Med- Yes  DVT prophylaxis SCD or NICOLE- No     Wounds: (If Applicable)    Wounds- Yes    Location left leg    Patient Safety:    Falls Score Total Score: 4  Safety Level_______  Bed Alarm On? Yes  Sitter?  No    Plan for upcoming shift: MRI, ECHO        Discharge Plan: No     Active Consults:  IP CONSULT TO NEUROLOGY  IP CONSULT TO NEUROLOGY

## 2017-10-15 NOTE — PROGRESS NOTES
Physical Therapy  Referral received, chart reviewed and consulted with nursing. Attempted to see patient for PT evaluation but patient currently off the floor for MRI. PT spoke with patients daughter. Patient has been living in nursing home, [facility], and was ambulating with L AFO and RW with LUE platform. Although she reports patient recently has been ambulating less and mostly performing SPT with 1 person assist. RLE with increased weakness from baseline. Will follow up tomorrow for PT evaluation.    Margoth Rodriges, PT, DPT

## 2017-10-15 NOTE — ED NOTES
TRANSFER - OUT REPORT:    Verbal report given to Ulises Gates RN (name) on [facility]  being transferred to [facility] (unit) for routine progression of care       Report consisted of patients Situation, Background, Assessment and   Recommendations(SBAR). Information from the following report(s) SBAR, Kardex, ED Summary, Intake/Output, MAR, Accordion and Recent Results was reviewed with the receiving nurse. Lines:   Peripheral IV 10/14/17 Right Antecubital (Active)   Site Assessment Clean, dry, & intact 10/14/2017  9:42 PM   Phlebitis Assessment 0 10/14/2017  9:42 PM   Infiltration Assessment 0 10/14/2017  9:42 PM   Dressing Status Clean, dry, & intact 10/14/2017  9:42 PM   Dressing Type Tape;Transparent 10/14/2017  9:42 PM   Hub Color/Line Status Pink;Flushed;Patent 10/14/2017  9:42 PM        Opportunity for questions and clarification was provided.       Patient transported with:   [employer]

## 2017-10-15 NOTE — PROGRESS NOTES
Hospitalist Progress Note    NAME: Brooklyn Hagen   :  3/16/1930   MRN:  [id number]       Assessment / Plan:  Stuttering TIA in setting of dementia, severe cerebrovascular disease and multiple prior CVAs, still not at baseline:    - CTA head and neck with moderate to severe diffuse intracranial atherosclerotic vascular disease. Multiple intracranial arterial stenoses but no definite large vessel vascular occlusive change. No significant cervical carotid arterial stenosis. Patent vertebrobasilar system is supplied by dominant right vertebral artery. However stenosis in proximal to midportion of the basilar artery. 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 - MRI brain with No intracranial mass, acute hemorrhage or acute infarction. Moderate to large remote infarction of the right parietal and right occipital lobes. Extensive chronic microvascular ischemic change. Moderate to severe cerebral atrophy.   - echo pending  - LDL 64, con't home pravacho  - con't home pradaxa with addition of ASA 81mg per neuro recommendations  - neuro consult appreciated  - PT/OT recommendations pending   UTI, present on admission:  Family reports frequent UTIs with resultant yeast infections r/t frequent Abx use  - urine culture >135627 gram negative rods  - rocephin and diflucan added   Insulin-dependent DM2 controlled with hypoglycemia:  - lantus dose held this morning, will start a lower dose tomorrow AM  - lispro sliding scale  CAD s/p CABG, chronic diastolic CHF, paroxysmal afib and benign HTN:  - con't outpt pradaxa  - restarting lotrel  - prn nitrobid   Dementia with depression:  - d/c remeron - discussed with Dtr that she needs order to discontinue on discharge  LLE wound, present on admission:  Pt completed abx prior to admission     Code Status: DNR  Surrogate Decision Maker: daughter Valentine Pena  DVT Prophylaxis: pradaxa      Subjective:     Chief Complaint / Reason for Physician Visit  Still not at baseline with some weakness and increased confusion. PT unable to work with Pt as she was in MRI today so still has not been able to have eval.  Discussed with RN events overnight. Review of Systems:  Symptom Y/N Comments  Symptom Y/N Comments   Fever/Chills n   Chest Pain n    Poor Appetite n   Edema n    Cough n   Abdominal Pain n    Sputum n   Joint Pain     SOB/SANDOVAL n   Pruritis/Rash     Nausea/vomit    Tolerating PT/OT     Diarrhea    Tolerating Diet     Constipation    Other       Could NOT obtain due to:      Objective:     VITALS:   Last 24hrs VS reviewed since prior progress note.  Most recent are:  Patient Vitals for the past 24 hrs:   Temp Pulse Resp BP SpO2   10/15/17 1137 98.3 °F (36.8 °C) 67 20 174/55 100 %   10/15/17 0431 98.2 °F (36.8 °C) 78 20 144/59 97 %   10/14/17 2239 98.5 °F (36.9 °C) 77 20 151/75 100 %   10/14/17 2215 - 80 19 152/62 100 %   10/14/17 2200 - 86 22 142/59 99 %   10/14/17 2145 - 95 20 160/62 98 %   10/14/17 2130 - 85 22 155/63 99 %   10/14/17 2115 98.4 °F (36.9 °C) 79 15 158/65 100 %   10/14/17 2100 - 85 17 151/65 99 %   10/14/17 2045 - 90 19 162/68 99 %   10/14/17 2030 - 88 18 170/57 100 %   10/14/17 2015 - 96 23 176/62 100 %   10/14/17 2000 - 92 29 162/75 99 %   10/14/17 1945 - 85 17 172/70 100 %   10/14/17 1930 - 80 15 172/73 100 %   10/14/17 1915 - 84 16 175/74 100 %   10/14/17 1900 - 84 19 162/72 100 %   10/14/17 1845 - 82 23 176/62 100 %   10/14/17 1830 - 89 14 164/63 100 %   10/14/17 1815 - 88 14 170/64 100 %   10/14/17 1800 - 87 15 175/70 100 %   10/14/17 1730 - 94 20 166/68 100 %   10/14/17 1715 - 89 19 147/73 100 %   10/14/17 1700 - 95 21 (!) 166/94 100 %   10/14/17 1645 - 94 19 168/60 100 %   10/14/17 1630 - 89 17 153/70 97 %   10/14/17 1600 - 100 19 172/89 100 %   10/14/17 1545 - 87 16 176/77 100 %   10/14/17 1530 - 94 19 182/51 99 %   10/14/17 1527 - - - 178/78 -   10/14/17 1513 - 95 18 178/78 100 %   10/14/17 1510 97.7 °F (36.5 °C) (!) 110 18 - 100 % Intake/Output Summary (Last 24 hours) at 10/15/17 1215  Last data filed at 10/14/17 1800   Gross per 24 hour   Intake                0 ml   Output              450 ml   Net             -450 ml        PHYSICAL EXAM:  General: WD, WN. Alert, cooperative, no acute distress    EENT:  EOMI. Anicteric sclerae. MMM  Resp:  CTA bilaterally, no wheezing or rales. No accessory muscle use  CV:  Regular rhythm,  No edema  GI:  Soft, mildly distended, Non tender.  +Bowel sounds  Neurologic:  Alert and oriented X 3, normal speech,   Psych:   Poor insight with mild confusion at times. Not anxious nor agitated  Skin:  No rashes. No jaundice    Reviewed most current lab test results and cultures  YES  Reviewed most current radiology test results   YES  Review and summation of old records today    NO  Reviewed patient's current orders and MAR    YES  PMH/ reviewed - no change compared to H&P  ________________________________________________________________________  Care Plan discussed with:    Comments   Patient x    Family  x dtr   RN x    Care Manager     Consultant                        Multidiciplinary team rounds were held today with , nursing, pharmacist and clinical coordinator. Patient's plan of care was discussed; medications were reviewed and discharge planning was addressed. ________________________________________________________________________  Total NON critical care TIME:  35 Minutes    Total CRITICAL CARE TIME Spent:   Minutes non procedure based      Comments   >50% of visit spent in counseling and coordination of care x    ________________________________________________________________________  Samreen Real MD     Procedures: see electronic medical records for all procedures/Xrays and details which were not copied into this note but were reviewed prior to creation of Plan. LABS:  I reviewed today's most current labs and imaging studies.   Pertinent labs include:  Recent Labs 10/14/17   1514   WBC  11.0   HGB  13.5   HCT  40.1   PLT  180     Recent Labs      10/14/17   1548  10/14/17   1514   NA   --   139   K   --   3.4*   CL   --   105   CO2   --   25   GLU   --   81   BUN   --   7   CREA   --   0.59   CA   --   8.3*   ALB   --   3.4*   TBILI   --   0.3   SGOT   --   18   ALT   --   22   INR  1.8*  1.5*       Signed: Rodney Yu MD

## 2017-10-15 NOTE — PROGRESS NOTES
[facility] Stroke Education [facility]ger and Stroke Education provided to patient and the following topics were discussed    1. Patients personal risk factors for stroke are hypertension, atrial fibrillation, hyperlipidemia, diabetes mellitus, CHF and prior stroke    2. Warning signs of Stroke:        * Sudden numbness or weakness of the face, arm or leg, especially on one side of          The body            * Sudden confusion, trouble speaking or understanding        * Sudden trouble seeing in one or both eyes        * Sudden trouble walking, dizziness, loss of balance or coordination        * Sudden severe headache with no known cause      3. Importance of activation Emergency Medical Services ( 9-1-1 ) immediately if she experience any warning signs of stroke. 4. Be sure and schedule a follow-up appointment with your primary care doctor or any specialists as instructed. 5. You must take medicine every day to treat your risk factors for stroke. Be sure to take your medicines exactly as your doctor tells you: no more, no less. Know what your medicines are for , what they do. Anti-thrombotics /anticoagulants can help prevent strokes. You are taking the following medicine(s) pradaxa    6. Smoking and second-hand smoke greatly increase your risk of stroke, cardiovascular disease and death.  Smoking history never

## 2017-10-15 NOTE — PROGRESS NOTES

## 2017-10-15 NOTE — PROGRESS NOTES
Pt admitted for CVA vs TIA. Await therapy recommendations for d/c planning. Pt wheelchair bound at baseline.

## 2017-10-15 NOTE — PROGRESS NOTES
2240: patient arrived to unit from ED. Patient alert and oriented to place, situation, and self. Vital signs stable and double skin assessment done with corinna AGUAYO. patient has LLE cellulitis wound with 4x4 over it. Patient incontinent and pure wick placed on patient. 2245: patient talking to herself and the television in the room, no able to get accurate information for admission assessment. Will wait for family during the day. 2316: patients blood sugar is 75, orange juice given, patient asymptomatic. 2330: patients blood sugar is 64, another orange juice given. 2350: patients sugar 79, had patient finish the last of the orange juice. 2354: blood sugar is 88.

## 2017-10-15 NOTE — PROGRESS NOTES
* No surgery found *  * No surgery found *  Bedside and Verbal shift change report given to Juanito Decker  RN (oncoming nurse) by Noel Diane RN (offgoing nurse).  Report included the following information SBAR, Kardex, ED Summary and Recent Results.     Zone [id number]        Significant changes during shift:  MRI completed        Patient Information     Sergey Mallory  80 y.o.  10/14/2017  2:38 PM by Delmy Hung MD. Sergey Mallory was admitted from ED     Problem List          Patient Active Problem List     Diagnosis Date Noted    S/P CABG (coronary artery bypass graft) 3 vessel 09/22/2016    Macular degeneration 09/22/2016    Stroke [facility])      Status post placement of implantable loop recorder 07/15/2016    Tricuspid valve insufficiency 07/14/2016    Mitral valve insufficiency 07/14/2016    CVA (cerebral vascular accident) ([facility] [address] 05/06/2016    PUD (peptic ulcer disease)      Advance care planning 01/28/2016    Unspecified vitamin D deficiency 08/21/2015    TIA (transient ischemic attack) 08/19/2013    Cancer of skin, squamous cell 07/02/2013    Occlusion and stenosis of carotid artery without mention of cerebral infarction 11/27/2012    Edema 11/07/2012    Osteoarthrosis, unspecified whether generalized or localized, unspecified site 05/31/2011    Pain in joint, shoulder region 05/31/2011    Coronary atherosclerosis of native coronary artery 09/07/2010    Essential hypertension, benign 06/01/2010    Pure hypercholesterolemia 06/01/2010    DM type 2 (diabetes mellitus, type 2) ([facility] [address] 06/01/2010    Acute upper respiratory infections of unspecified site 06/01/2010           Past Medical History:   Diagnosis Date    A-fib [facility])       per patient and daughter   Tammy Cough [facility])      Arthritis      Bladder infection, chronic       hx    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     third-way heart bypass    CAD (coronary artery disease)      Chronic UTI      Congestive heart failure, unspecified      Depression      Hypercholesterolemia      Hypertension      Hypokalemia      Insomnia      Macular degeneration      Moderate mitral regurgitation      Murmur      NIDDM       daily medicatons for diabetes    Skin cancer      Stroke [facility]) [phone],     4 strokes and 1 TIA    Sun-damaged skin      Sunburn, blistering      Unspecified vitamin D deficiency              Core Measures:     CVA: Yes Yes  CHF:No No  PNA:No No     Post Op Surgical (If Applicable):      Activity Status:     OOB to Chair No  Ambulated this shift No   Bed Rest Yes     Supplemental O2: (If Applicable)     NC No  NRB No  Venti-mask No  On 0 Liters/min        LINES AND DRAINS:     PIV only     DVT prophylaxis:     DVT prophylaxis Med- Yes  DVT prophylaxis SCD or NICOLE- No      Wounds: (If Applicable)     Wounds- Yes     Location left leg     Patient Safety:     Falls Score Total Score: 4  Safety Level_______  Bed Alarm On? Yes  Sitter?  No     Plan for upcoming shift: MRI, ECHO           Discharge Plan: No      Active Consults:  IP CONSULT TO NEUROLOGY  IP CONSULT TO NEUROLOGY

## 2017-10-15 NOTE — CONSULTS
NEUROLOGY NOTE       DATE OF CONSULTATION: 10/15/2017    CONSULTED BY: Alia Hayward MD    Chief Complaint   Patient presents with    Dysarthria     patients daughter states that patient began having slurred speech 30 minutes ago. patient noted to have slow slurred speech in triage. daughter states patient is \"acting weird\" and states that this is not patients normal        Reason for Consult  I have been asked to see the patient in neurological consultation to render advice and opinion regarding possible stroke    HISTORY OF PRESENT ILLNESS  Italia Bynum is a 80 y.o. female who presents to the hospital because of garbled speech. The patient does have history of 2 strokes in the past with residual left-sided weakness. Yesterday she was going out to lunch with her 2 daughters when all of a sudden she developed expressive aphasia and slurred speech. The daughter also noticed left facial droop. This lasted for a few minutes and then resolved. And there was a recurrence of the symptoms and hence the patient was brought to the hospital.  The patient does have left-sided weakness but that is worse from the baseline as well. She does have atrial fibrillation and is on Pradaxa at this time. She has been on aspirin, Plavix and Eliquis in the past and has failed it. Also she does have hypertension, diabetes and hyperlipidemia. She had a CT scan of the head which does not show any acute findings.     ROS  A ten system review of constitutional, cardiovascular, respiratory, musculoskeletal, endocrine, skin, SHEENT, genitourinary, psychiatric and neurologic systems was obtained and is unremarkable except as stated in HPI     PMH  Past Medical History:   Diagnosis Date    A-fib [facility])     per patient and daughter   Melissa Ellison 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    third-way heart bypass    CAD (coronary artery disease)     Chronic UTI     Congestive heart failure, unspecified     Depression     Hypercholesterolemia     Hypertension     Hypokalemia     Insomnia     Macular degeneration     Moderate mitral regurgitation     Murmur     NIDDM     daily medicatons for diabetes    Skin cancer     Stroke ([facility].) 2331,2429,    4 strokes and 1 TIA    Sun-damaged skin     Sunburn, blistering     Unspecified vitamin D deficiency        FH  Family History   Problem Relation Age of Onset    Diabetes Daughter     Heart Disease Daughter      stent placed    Stroke Daughter     Stroke Sister     Diabetes Mother        SH  Social History     Social History    Marital status:      Spouse name: N/A    Number of children: 4    Years of education: 14     Social History Main Topics    Smoking status: Never Smoker    Smokeless tobacco: Never Used    Alcohol use 0.0 oz/week     0 Standard drinks or equivalent per week      Comment: once a year    Drug use: No    Sexual activity: Not Currently     Other Topics Concern     Service No    Blood Transfusions No    Caffeine Concern No    Occupational Exposure No    Hobby Hazards No    Sleep Concern Yes    Stress Concern Yes    Weight Concern No    Special Diet Yes    Back Care Yes    Exercise Yes    Bike Helmet No    Seat Belt Yes    Self-Exams No     Social History Narrative    Lives in [facility] alone.  since 2008. Had one daughter who was murdered in March 2012. Has 2 living daughters and 1 living son. Worked for the [employer] in [facility]. Likes [employer]. Moved from [place] in 2008. ALLERGIES  Allergies   Allergen Reactions    Aleve [Naproxen Sodium] Other (comments)     Caused stomach ulcer and thrush    Eliquis [Apixaban] Other (comments)     dizziness    Labetalol Other (comments)     dizzyness    Metformin Diarrhea     With both IR and ER     Plavix [Clopidogrel] Other (comments)     Pt.  Said it was not effective       PHYSICAL EXAM  EXAMINATION: Patient Vitals for the past 24 hrs:   Temp Pulse Resp BP SpO2   10/15/17 0431 98.2 °F (36.8 °C) 78 20 144/59 97 %   10/14/17 2239 98.5 °F (36.9 °C) 77 20 151/75 100 %   10/14/17 2215 - 80 19 152/62 100 %   10/14/17 2200 - 86 22 142/59 99 %   10/14/17 2145 - 95 20 160/62 98 %   10/14/17 2130 - 85 22 155/63 99 %   10/14/17 2115 98.4 °F (36.9 °C) 79 15 158/65 100 %   10/14/17 2100 - 85 17 151/65 99 %   10/14/17 2045 - 90 19 162/68 99 %   10/14/17 2030 - 88 18 170/57 100 %   10/14/17 2015 - 96 23 176/62 100 %   10/14/17 2000 - 92 29 162/75 99 %   10/14/17 1945 - 85 17 172/70 100 %   10/14/17 1930 - 80 15 172/73 100 %   10/14/17 1915 - 84 16 175/74 100 %   10/14/17 1900 - 84 19 162/72 100 %   10/14/17 1845 - 82 23 176/62 100 %   10/14/17 1830 - 89 14 164/63 100 %   10/14/17 1815 - 88 14 170/64 100 %   10/14/17 1800 - 87 15 175/70 100 %   10/14/17 1730 - 94 20 166/68 100 %   10/14/17 1715 - 89 19 147/73 100 %   10/14/17 1700 - 95 21 (!) 166/94 100 %   10/14/17 1645 - 94 19 168/60 100 %   10/14/17 1630 - 89 17 153/70 97 %   10/14/17 1600 - 100 19 172/89 100 %   10/14/17 1545 - 87 16 176/77 100 %   10/14/17 1530 - 94 19 182/51 99 %   10/14/17 1527 - - - 178/78 -   10/14/17 1513 - 95 18 178/78 100 %   10/14/17 1510 97.7 °F (36.5 °C) (!) 110 18 - 100 %        General:   General appearance: Pt is in no acute distress   Distal pulses are preserved  Fundoscopic exam: attempted    Neurological Examination:   Mental Status:  AAO x3. Speech is fluent. Follows commands, has fair fund of knowledge, attention, short term recall, comprehension and insight. Cranial Nerves: Visual fields are decreased on the left but she does have left-sided macular edema. PERRL, Extraocular movements are full. Facial sensation is decreased on the left. Facial movement shows mild left facial weakness. Hearing intact to conversation. Palate elevates symmetrically. Shoulder shrug symmetric. Tongue midline.      Motor: Strength is 2 out of 5 in proximal left upper extremity and 4 out of 5 in the distal left upper extremity. Left hand contracture present. Left lower extremity is 1-2 out of 5. Right side is 5 out of 5. Normal tone. No atrophy. Sensation: Decreased pinprick in the left upper and lower extremities    Reflexes: DTRs 2+ on the right and 3+ on the left with absent ankle reflexes. Coordination/Cerebellar: Intact to finger-nose-finger on the right but was not able to perform in the left because of the weakness    Gait: Deferred because of fall risk    Skin: No significant bruising or lacerations. LAB DATA REVIEWED:    Recent Results (from the past 24 hour(s))   GLUCOSE, POC    Collection Time: 10/14/17  2:35 PM   Result Value Ref Range    Glucose (POC) 112 (H) 65 - 100 mg/dL    Performed by Colette Glynn    CBC WITH AUTOMATED DIFF    Collection Time: 10/14/17  3:14 PM   Result Value Ref Range    WBC 11.0 3.6 - 11.0 K/uL    RBC 4.41 3.80 - 5.20 M/uL    HGB 13.5 11.5 - 16.0 g/dL    HCT 40.1 35.0 - 47.0 %    MCV 90.9 80.0 - 99.0 FL    MCH 30.6 26.0 - 34.0 PG    MCHC 33.7 30.0 - 36.5 g/dL    RDW 14.0 11.5 - 14.5 %    PLATELET 637 211 - 798 K/uL    NEUTROPHILS 76 (H) 32 - 75 %    LYMPHOCYTES 16 12 - 49 %    MONOCYTES 7 5 - 13 %    EOSINOPHILS 1 0 - 7 %    BASOPHILS 0 0 - 1 %    ABS. NEUTROPHILS 8.4 (H) 1.8 - 8.0 K/UL    ABS. LYMPHOCYTES 1.7 0.8 - 3.5 K/UL    ABS. MONOCYTES 0.8 0.0 - 1.0 K/UL    ABS. EOSINOPHILS 0.1 0.0 - 0.4 K/UL    ABS.  BASOPHILS 0.0 0.0 - 0.1 K/UL   PROTHROMBIN TIME + INR    Collection Time: 10/14/17  3:14 PM   Result Value Ref Range    INR 1.5 (H) 0.9 - 1.1      Prothrombin time 15.5 (H) 9.0 - 53.7 sec   METABOLIC PANEL, COMPREHENSIVE    Collection Time: 10/14/17  3:14 PM   Result Value Ref Range    Sodium 139 136 - 145 mmol/L    Potassium 3.4 (L) 3.5 - 5.1 mmol/L    Chloride 105 97 - 108 mmol/L    CO2 25 21 - 32 mmol/L    Anion gap 9 5 - 15 mmol/L    Glucose 81 65 - 100 mg/dL    BUN 7 6 - 20 MG/DL    Creatinine 0.59 0.55 - 1.02 MG/DL    BUN/Creatinine ratio 12 12 - 20      GFR est AA >60 >60 ml/min/1.73m2    GFR est non-AA >60 >60 ml/min/1.73m2    Calcium 8.3 (L) 8.5 - 10.1 MG/DL    Bilirubin, total 0.3 0.2 - 1.0 MG/DL    ALT (SGPT) 22 12 - 78 U/L    AST (SGOT) 18 15 - 37 U/L    Alk. phosphatase 139 (H) 45 - 117 U/L    Protein, total 6.5 6.4 - 8.2 g/dL    Albumin 3.4 (L) 3.5 - 5.0 g/dL    Globulin 3.1 2.0 - 4.0 g/dL    A-G Ratio 1.1 1.1 - 2.2     PTT    Collection Time: 10/14/17  3:14 PM   Result Value Ref Range    aPTT 47.6 (H) 22.1 - 32.5 sec    aPTT, therapeutic range     58.0 - 77.0 SECS   GLUCOSE, POC    Collection Time: 10/14/17  3:34 PM   Result Value Ref Range    Glucose (POC) 69 65 - 100 mg/dL    Performed by Missael Salazar    POC INR    Collection Time: 10/14/17  3:48 PM   Result Value Ref Range    INR (POC) 1.8 (H) <1.2     GLUCOSE, POC    Collection Time: 10/14/17  3:55 PM   Result Value Ref Range    Glucose (POC) 72 65 - 100 mg/dL    Performed by [employer]    POC [facility]    Collection Time: 10/14/17  4:05 PM   Result Value Ref Range    Calcium, ionized (POC) 1.18 1.12 - 1.32 MMOL/L    Sodium (POC) 143 136 - 145 MMOL/L    Potassium (POC) 3.4 (L) 3.5 - 5.1 MMOL/L    Chloride (POC) 104 98 - 107 MMOL/L    CO2 (POC) 24 21 - 32 MMOL/L    Anion gap (POC) 20 (H) 5 - 15 mmol/L    Glucose (POC) 68 65 - 100 MG/DL    BUN (POC) 6 (L) 9 - 20 MG/DL    Creatinine (POC) 0.5 (L) 0.6 - 1.3 MG/DL    GFRAA, POC >60 >60 ml/min/1.73m2    GFRNA, POC >60 >60 ml/min/1.73m2    Hemoglobin (POC) 16.0 11.5 - 16.0 GM/DL    Hematocrit (POC) 47 35.0 - 47.0 %    Comment Comment Not Indicated.      GLUCOSE, POC    Collection Time: 10/14/17  4:11 PM   Result Value Ref Range    Glucose (POC) 92 65 - 100 mg/dL    Performed by Prashanth Covarrubias    URINALYSIS W/ REFLEX CULTURE    Collection Time: 10/14/17  5:12 PM   Result Value Ref Range    Color YELLOW/STRAW      Appearance CLOUDY (A) CLEAR      Specific gravity 1.005 1.003 - 1.030      pH (UA) 7.0 5.0 - 8.0 Protein NEGATIVE  NEG mg/dL    Glucose NEGATIVE  NEG mg/dL    Ketone NEGATIVE  NEG mg/dL    Bilirubin NEGATIVE  NEG      Blood TRACE (A) NEG      Urobilinogen 0.2 0.2 - 1.0 EU/dL    Nitrites NEGATIVE  NEG      Leukocyte Esterase LARGE (A) NEG      WBC 20-50 0 - 4 /hpf    RBC 5-10 0 - 5 /hpf    Epithelial cells FEW FEW /lpf    Bacteria 4+ (A) NEG /hpf    UA:UC IF INDICATED URINE CULTURE ORDERED (A) CNI     GLUCOSE, POC    Collection Time: 10/14/17 11:16 PM   Result Value Ref Range    Glucose (POC) 75 65 - 100 mg/dL    Performed by Guillermo Galindo    GLUCOSE, POC    Collection Time: 10/14/17 11:30 PM   Result Value Ref Range    Glucose (POC) 64 (L) 65 - 100 mg/dL    Performed by Dipika Plump    GLUCOSE, POC    Collection Time: 10/14/17 11:32 PM   Result Value Ref Range    Glucose (POC) 66 65 - 100 mg/dL    Performed by Seth Miramontes, POC    Collection Time: 10/14/17 11:50 PM   Result Value Ref Range    Glucose (POC) 79 65 - 100 mg/dL    Performed by Dipika Plump    GLUCOSE, POC    Collection Time: 10/14/17 11:54 PM   Result Value Ref Range    Glucose (POC) 88 65 - 100 mg/dL    Performed by Dipika Plump    TSH 3RD GENERATION    Collection Time: 10/15/17  4:45 AM   Result Value Ref Range    TSH 1.27 0.36 - 3.74 uIU/mL   GLUCOSE, POC    Collection Time: 10/15/17  7:01 AM   Result Value Ref Range    Glucose (POC) 102 (H) 65 - 100 mg/dL    Performed by Gisela Chavis         Imaging review:  CT Head  No significant interval change. Stroke workup    MRI Brain  Pending    CTA Head and neck  1. No evidence of large vessel occlusion. Mild narrowing in the origin of the  right MCA. Atherosclerotic disease in the carotid siphons, intracranial  vertebral arteries, carotid bulbs. The right vertebral artery is dominant. 2. Moderate-sized area of chronic encephalomalacia in the right parietal lobe. Chronic lacunar infarct in the right external capsule.  Significant white matter  disease likely to chronic small vessel ischemic disease. 3. Multinodular goiter. TTE:   Pending    Stroke labs:  HgBA1c    Lab Results   Component Value Date/Time    Hemoglobin A1c 6.9 10/05/2017 11:27 AM     LDL   Lab Results   Component Value Date/Time    LDL, calculated 72 2017 02:25 PM       HOME MEDS  Prior to Admission Medications   Prescriptions Last Dose Informant Patient Reported? Taking? LOPERAMIDE HCL (IMODIUM PO) 10/13/2017 at Unknown time Child Yes Yes   Sig: Take 2 Tabs by mouth daily as needed for Diarrhea. PHENAZOPYRIDINE HCL (PYRIDIUM PO) Not Taking at Unknown time Child Yes No   Sig: Take 1 Tab by mouth daily as needed (UTI symptoms). acetaminophen (TYLENOL ARTHRITIS PAIN) 650 mg CR tablet 10/13/2017 at Unknown time Child No Yes   Sig: Take 1 Tab by mouth two (2) times daily as needed for Pain. amLODIPine-benazepril (LOTREL) 5-40 mg per capsule 10/14/2017 at Unknown time Child Yes Yes   Sig: Take 1 Cap by mouth nightly. calcium carbonate (TUMS) 200 mg calcium (500 mg) chew 10/13/2017 at Unknown time Child No Yes   Sig: Take 1 Tab by mouth two (2) times daily as needed. dabigatran etexilate (PRADAXA) 150 mg capsule 10/14/2017 at Unknown time Child No Yes   Sig: TAKE ONE CAPSULE BY MOUTH EVERY 12 HOURS FOR CEREBROVASCULAR ACCIDENT   doxycycline (MONODOX) 100 mg capsule 10/13/2017 at Unknown time  Yes Yes   Sig: Take 100 mg by mouth two (2) times a day. insulin glargine (LANTUS SOLOSTAR) 100 unit/mL (3 mL) inpn 10/14/2017 at Unknown time Child Yes Yes   Si Units by SubCUTAneous route daily. levoFLOXacin (LEVAQUIN) 250 mg tablet 10/13/2017 at Unknown time  No Yes   Sig: Take 1 Tab by mouth daily. D/V doxycyclin   mirtazapine (REMERON) 7.5 mg tablet 10/13/2017 at Unknown time  No Yes   Sig: Take 1 Tab by mouth nightly. pravastatin (PRAVACHOL) 10 mg tablet 10/13/2017 at Unknown time Child No Yes   Sig: TAKE 1 TABLET BY MOUTH NIGHTLY. DISCONTINUE LIPITOR.    traZODone (DESYREL) 50 mg tablet 10/13/2017 at Unknown time Child No Yes   Sig: TAKE 1/2 TABLET BY MOUTH EVERY NIGHT      Facility-Administered Medications: None       CURRENT MEDS  Current Facility-Administered Medications   Medication Dose Route Frequency    sodium chloride (NS) flush 5-10 mL  5-10 mL IntraVENous Q8H    dabigatran etexilate (PRADAXA) capsule 150 mg  150 mg Oral Q12H    pravastatin (PRAVACHOL) tablet 10 mg  10 mg Oral QHS    insulin lispro (HUMALOG) injection   SubCUTAneous AC&HS    insulin glargine (LANTUS) injection 20 Units  20 Units SubCUTAneous DAILY       IMPRESSION:  Oswaldo Calloway is a 80 y.o. female who presents with new onset left-sided weakness along with dysarthria. Weakness is worse from her baseline of 2 strokes in the past.  The patient is tolerating Pradaxa for atrial fibrillation. Patient is also have hypertension, hyperlipidemia and diabetes. Will add aspirin 81 mg a day to Pradaxa and will continue with stroke workup. RECOMMENDATIONS:  - MRI brain w/o C - Pending  - CTA Head and neck are normal  - TTE - Pending  - Telemetry  - Permissive HTN (SBP<220/<120) for 24 hrs from symptom onset and then BP goal is less than 140/90  - Stroke labs (HgbA1c, TSH, lipid panel)  - Continue pradaxa. Add aspirin 81 mg a day  - Increase pravastatin to 20 mg daily as LDL more than 70.  - ST/OT/PT tunde    Thank you very much for this consultation. We will follow up on the above studies and give further recommendations as indicated.       Ronell Bumpers, MD  Neurologist

## 2017-10-16 NOTE — PROGRESS NOTES
NEUROLOGY NOTE       Chief Complaint   Patient presents with    Dysarthria     patients daughter states that patient began having slurred speech 30 minutes ago. patient noted to have slow slurred speech in triage. daughter states patient is \"acting weird\" and states that this is not patients normal      SUBJECTIVE:  Was confused last night and this am. Seems to be calm when I saw her  Left sided weakness is slightly better    HISTORY OF PRESENT ILLNESS  Silverio Snider is a 80 y.o. female who presents to the hospital because of garbled speech. The patient does have history of 2 strokes in the past with residual left-sided weakness. Yesterday she was going out to lunch with her 2 daughters when all of a sudden she developed expressive aphasia and slurred speech. The daughter also noticed left facial droop. This lasted for a few minutes and then resolved. And there was a recurrence of the symptoms and hence the patient was brought to the hospital.  The patient does have left-sided weakness but that is worse from the baseline as well. She does have atrial fibrillation and is on Pradaxa at this time. She has been on aspirin, Plavix and Eliquis in the past and has failed it. Also she does have hypertension, diabetes and hyperlipidemia. She had a CT scan of the head which does not show any acute findings.     ROS  A ten system review of constitutional, cardiovascular, respiratory, musculoskeletal, endocrine, skin, SHEENT, genitourinary, psychiatric and neurologic systems was obtained and is unremarkable except as stated in HPI     PMH  Past Medical History:   Diagnosis Date    A-fib [facility]     per patient and daughter   Lobo Griffiths [facility]     Arthritis     Bladder infection, chronic     hx    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    third-way heart bypass    CAD (coronary artery disease)     Chronic UTI     Congestive heart failure, unspecified     Depression     Hypercholesterolemia     Hypertension  Hypokalemia     Insomnia     Macular degeneration     Moderate mitral regurgitation     Murmur     NIDDM     daily medicatons for diabetes    Skin cancer     Stroke ([facility].) [id number],    4 strokes and 1 TIA    Sun-damaged skin     Sunburn, blistering     Unspecified vitamin D deficiency        FH  Family History   Problem Relation Age of Onset    Diabetes Daughter     Heart Disease Daughter      stent placed    Stroke Daughter     Stroke Sister     Diabetes Mother        SH  Social History     Social History    Marital status:      Spouse name: N/A    Number of children: 4    Years of education: 15     Social History Main Topics    Smoking status: Never Smoker    Smokeless tobacco: Never Used    Alcohol use 0.0 oz/week     0 Standard drinks or equivalent per week      Comment: once a year    Drug use: No    Sexual activity: Not Currently     Other Topics Concern     Service No    Blood Transfusions No    Caffeine Concern No    Occupational Exposure No    Hobby Hazards No    Sleep Concern Yes    Stress Concern Yes    Weight Concern No    Special Diet Yes    Back Care Yes    Exercise Yes    Bike Helmet No    Seat Belt Yes    Self-Exams No     Social History Narrative    Lives in [facility] alone.  since 2008. Had one daughter who was murdered in March 2012. Has 2 living daughters and 1 living son. Worked for the [employer] in [place]. Likes [employer]. Moved from [facility] in 2008. ALLERGIES  Allergies   Allergen Reactions    Aleve [Naproxen Sodium] Other (comments)     Caused stomach ulcer and thrush    Eliquis [Apixaban] Other (comments)     dizziness    Labetalol Other (comments)     dizzyness    Metformin Diarrhea     With both IR and ER     Plavix [Clopidogrel] Other (comments)     Pt.  Said it was not effective       PHYSICAL EXAM  EXAMINATION:   Patient Vitals for the past 24 hrs:   Temp Pulse Resp BP SpO2   10/16/17 0826 98.8 °F (37.1 °C) 80 20 165/84 98 %   10/16/17 0340 98.2 °F (36.8 °C) 79 18 (!) 163/94 -   10/15/17 2322 98.2 °F (36.8 °C) 80 20 165/88 98 %   10/15/17 2012 98.6 °F (37 °C) 96 20 (!) 152/97 100 %   10/15/17 1558 98 °F (36.7 °C) 74 18 151/65 98 %   10/15/17 1137 98.3 °F (36.8 °C) 67 20 174/55 100 %        General:   General appearance: Pt is in no acute distress   Distal pulses are preserved    Neurological Examination:   Mental Status:  AAO x3. Speech is fluent. Follows commands, has fair fund of knowledge, attention, short term recall, comprehension and insight. Cranial Nerves: Visual fields are decreased on the left but she does have left-sided macular edema. PERRL, Extraocular movements are full. Facial sensation is decreased on the left. Facial movement shows mild left facial weakness. Hearing intact to conversation. Palate elevates symmetrically. Shoulder shrug symmetric. Tongue midline. Motor: Strength is 2 out of 5 in proximal left upper extremity and 4 out of 5 in the distal left upper extremity. Left hand contracture present. Left lower extremity is 2 out of 5. Right side is 5 out of 5. Normal tone. No atrophy. Sensation: Decreased pinprick in the left upper and lower extremities    Gait: Deferred because of fall risk    Skin: No significant bruising or lacerations.     LAB DATA REVIEWED:    Recent Results (from the past 24 hour(s))   GLUCOSE, POC    Collection Time: 10/15/17 11:28 AM   Result Value Ref Range    Glucose (POC) 150 (H) 65 - 100 mg/dL    Performed by Jihan Thomason (SON)    GLUCOSE, POC    Collection Time: 10/15/17  4:12 PM   Result Value Ref Range    Glucose (POC) 206 (H) 65 - 100 mg/dL    Performed by Jihan Thomason (SON)    GLUCOSE, POC    Collection Time: 10/15/17 11:14 PM   Result Value Ref Range    Glucose (POC) 211 (H) 65 - 100 mg/dL    Performed by Kalani Ford (PCT)    METABOLIC PANEL, BASIC    Collection Time: 10/16/17  4:13 AM   Result Value Ref Range    Sodium 140 136 - 145 mmol/L    Potassium 4.0 3.5 - 5.1 mmol/L    Chloride 106 97 - 108 mmol/L    CO2 26 21 - 32 mmol/L    Anion gap 8 5 - 15 mmol/L    Glucose 158 (H) 65 - 100 mg/dL    BUN 9 6 - 20 MG/DL    Creatinine 0.50 (L) 0.55 - 1.02 MG/DL    BUN/Creatinine ratio 18 12 - 20      GFR est AA >60 >60 ml/min/1.73m2    GFR est non-AA >60 >60 ml/min/1.73m2    Calcium 8.9 8.5 - 10.1 MG/DL   CBC W/O DIFF    Collection Time: 10/16/17  4:13 AM   Result Value Ref Range    WBC 8.9 3.6 - 11.0 K/uL    RBC 4.43 3.80 - 5.20 M/uL    HGB 13.9 11.5 - 16.0 g/dL    HCT 40.3 35.0 - 47.0 %    MCV 91.0 80.0 - 99.0 FL    MCH 31.4 26.0 - 34.0 PG    MCHC 34.5 30.0 - 36.5 g/dL    RDW 13.7 11.5 - 14.5 %    PLATELET 290 649 - 701 K/uL   GLUCOSE, POC    Collection Time: 10/16/17  6:48 AM   Result Value Ref Range    Glucose (POC) 171 (H) 65 - 100 mg/dL    Performed by Les Scott         Imaging review:  CT Head  No significant interval change. Stroke workup    MRI Brain  No intracranial mass, acute hemorrhage or acute infarction. Moderate to large remote infarction of the right parietal and right occipital  lobes. Extensive chronic microvascular ischemic change. Moderate to severe cerebral atrophy. I reviewed the images and there is small lacunar infarct in the right occipital lobe. CTA Head and neck  1. No evidence of large vessel occlusion. Mild narrowing in the origin of the  right MCA. Atherosclerotic disease in the carotid siphons, intracranial  vertebral arteries, carotid bulbs. The right vertebral artery is dominant. 2. Moderate-sized area of chronic encephalomalacia in the right parietal lobe. Chronic lacunar infarct in the right external capsule. Significant white matter  disease likely to chronic small vessel ischemic disease. 3. Multinodular goiter.     TTE:   Pending    Stroke labs:  HgBA1c    Lab Results   Component Value Date/Time    Hemoglobin A1c 6.9 10/05/2017 11:27 AM     LDL   Lab Results   Component Value Date/Time LDL, calculated 64 10/15/2017 04:45 AM       HOME MEDS  Prior to Admission Medications   Prescriptions Last Dose Informant Patient Reported? Taking? LOPERAMIDE HCL (IMODIUM PO) 10/13/2017 at Unknown time Child Yes Yes   Sig: Take 2 Tabs by mouth daily as needed for Diarrhea. PHENAZOPYRIDINE HCL (PYRIDIUM PO) Not Taking at Unknown time Child Yes No   Sig: Take 1 Tab by mouth daily as needed (UTI symptoms). acetaminophen (TYLENOL ARTHRITIS PAIN) 650 mg CR tablet 10/13/2017 at Unknown time Child No Yes   Sig: Take 1 Tab by mouth two (2) times daily as needed for Pain. amLODIPine-benazepril (LOTREL) 5-40 mg per capsule 10/14/2017 at Unknown time Child Yes Yes   Sig: Take 1 Cap by mouth nightly. calcium carbonate (TUMS) 200 mg calcium (500 mg) chew 10/13/2017 at Unknown time Child No Yes   Sig: Take 1 Tab by mouth two (2) times daily as needed. dabigatran etexilate (PRADAXA) 150 mg capsule 10/14/2017 at Unknown time Child No Yes   Sig: TAKE ONE CAPSULE BY MOUTH EVERY 12 HOURS FOR CEREBROVASCULAR ACCIDENT   doxycycline (MONODOX) 100 mg capsule 10/13/2017 at Unknown time  Yes Yes   Sig: Take 100 mg by mouth two (2) times a day. insulin glargine (LANTUS SOLOSTAR) 100 unit/mL (3 mL) inpn 10/14/2017 at Unknown time Child Yes Yes   Si Units by SubCUTAneous route daily. levoFLOXacin (LEVAQUIN) 250 mg tablet 10/13/2017 at Unknown time  No Yes   Sig: Take 1 Tab by mouth daily. D/V doxycyclin   mirtazapine (REMERON) 7.5 mg tablet 10/13/2017 at Unknown time  No Yes   Sig: Take 1 Tab by mouth nightly. pravastatin (PRAVACHOL) 10 mg tablet 10/13/2017 at Unknown time Child No Yes   Sig: TAKE 1 TABLET BY MOUTH NIGHTLY. DISCONTINUE LIPITOR.    traZODone (DESYREL) 50 mg tablet 10/13/2017 at Unknown time Child No Yes   Sig: TAKE 1/2 TABLET BY MOUTH EVERY NIGHT      Facility-Administered Medications: None       CURRENT MEDS  Current Facility-Administered Medications   Medication Dose Route Frequency    insulin glargine (LANTUS) injection 10 Units  10 Units SubCUTAneous DAILY    pravastatin (PRAVACHOL) tablet 20 mg  20 mg Oral QHS    aspirin delayed-release tablet 81 mg  81 mg Oral DAILY    cefTRIAXone (ROCEPHIN) 1 g in 0.9% sodium chloride (MBP/ADV) 50 mL  1 g IntraVENous Q24H    fluconazole (DIFLUCAN) tablet 200 mg  200 mg Oral DAILY    amLODIPine/benazepril (LOTREL) 5/40 mg   Oral QHS    sodium chloride (NS) flush 5-10 mL  5-10 mL IntraVENous Q8H    dabigatran etexilate (PRADAXA) capsule 150 mg  150 mg Oral Q12H    insulin lispro (HUMALOG) injection   SubCUTAneous AC&HS       IMPRESSION:  Lissa Diez is a 80 y.o. female who presents with new onset left-sided weakness along with dysarthria. Weakness is worse from her baseline of 2 strokes in the past.  The patient is tolerating Pradaxa for atrial fibrillation. Patient is also have hypertension, hyperlipidemia and diabetes. Will add aspirin 81 mg a day to Pradaxa and will continue with stroke workup.     RECOMMENDATIONS:  - MRI brain w/o C - new lacunar right occipital infarct (reviewed image)  - CTA Head and neck are normal  - TTE - Pending  - Telemetry  - BP goal is less than 140/90  - Stroke labs (HgbA1c, TSH, lipid panel) - well controlled  - Continue pradaxa and aspirin 81 mg a day  - Continue pravastatin 20 mg daily (home dose 10 mg a day)  - ST/OT/PT tunde Worthy MD  Neurologist

## 2017-10-16 NOTE — PROGRESS NOTES
PCP f/u is scheduled with Dr Donya Middleton for Oct 18 at 3;00pm    A f/u with Yamile Gomez, NP is scheduled for Nov 30 at 11;30am

## 2017-10-16 NOTE — DISCHARGE INSTRUCTIONS
HOSPITALIST DISCHARGE INSTRUCTIONS    NAME: Jackson Vazquez   :  3/16/1930   MRN:  [id number]     Date/Time:  10/16/2017 11:18 AM    ADMIT DATE: 10/14/2017   DISCHARGE DATE: 10/16/2017     Attending Physician: Justin Granados MD    DISCHARGE DIAGNOSIS:  Stuttering TIA in setting of dementia, severe cerebrovascular disease and multiple prior CVAs, still not at baseline  E Coli UTI, present on admission  Insulin-dependent DM2 controlled  CAD s/p CABG, chronic diastolic CHF, paroxysmal afib and benign HTN  Dementia with depression      Medications: Per above medication reconciliation. Pain Management: per above medications    Recommended diet: Diabetic Diet    Recommended activity: Activity as tolerated, PT/OT Eval and Treat and fall precautions    Wound care: None    Indwelling devices:  None    Supplemental Oxygen: None    Required Lab work: None    Glucose management:  Accucheck ACHS with sliding scale per SNF protocol    Code status: DNR    Other:  Do not take remeron (mirtazepine) any more. Outside physician follow up: Follow-up Information     Follow up With Details Comments Contact Info    Willie Hurtado MD In 1 week routine hospital follow up and UTI follow up [address]  [address]      Talat Chávez MD In 1 month As needed for your TIA [phone] [facility] [address]  [address] [phone]                 Skilled nursing facility/ SNF MD responsible for above on discharge. Information obtained by :  I understand that if any problems occur once I am at home I am to contact my physician. I understand and acknowledge receipt of the instructions indicated above.                                                                                                                                            Physician's or R.N.'s Signature                                                                  Date/Time Patient or Representative

## 2017-10-16 NOTE — PROGRESS NOTES
Speech pathology  Orders received, chart reviewed, spoke with RN, patient and patient's daughter. Patient had transient episodes of aphasia and slurred speech; however, aphasia has since resolved. Patient with minimally dysarthric speech but is 100% intelligible in conversational speech. She is able to effectively express wants/needs. Encouraged her to use loud, slow speech with use of over-articulation. She had mild dysarthria after her old CVA as well. MRI read as no acute infarct but neurologist reports small lacunar infarct in R occipital lobe. Patient passed STAND and is tolerating a regular diet/ thin liquids. Patient with pleasant confusion and daughter reporting moderate dementia. Formal ST not indicated during hospitalization. Will complete orders.    Justus Padilla M.S. CCC-SLP

## 2017-10-16 NOTE — DISCHARGE SUMMARY
Hospitalist Discharge Summary     Patient ID:  Julio Santo  [id number]  80 y.o.  3/16/1930    PCP on record: Margot Fernandes MD    Admit date: 10/14/2017  Discharge date and time: 10/16/2017      DISCHARGE DIAGNOSIS:  Stuttering TIA in setting of dementia, severe cerebrovascular disease and multiple prior CVAs, still not at baseline  UTI, present on admission  Insulin-dependent DM2 controlled with hypoglycemia  CAD s/p CABG, chronic diastolic CHF, paroxysmal afib and benign HTN  Dementia with depression  LLE wound, present on admission      CONSULTATIONS:  IP CONSULT TO NEUROLOGY  IP CONSULT TO NEUROLOGY    Excerpted HPI from H&P of Preeti Corado MD:  Terrence Hanson is a 80 y.o.  female with pmhx significant for HTN, CAD (s/p CABG), CHF, CAD, CVA (with residual left sided deficits), NIDDM, Dementia, and A-fib, presen tto ED for further evaluation of dysarthria and cva like symptoms. Per daughter, symptoms occurred approx 1 hr prior to admission. Pt was apparently having a difficult time calling her daughter's name. Initially, daughter thought pt was hypoglycemic, so she gave pt some Coke, however symptoms persisted with dysarthria and episodes of \"starring\", which eventually led her to the ED for further evaluation. Of note, family states pt had failed ASA/plavix/and Eliquis int he past.  She was transitioned to Pradaxa and had remained stable on it. In the ER, pt was conversant, although was intermittently confused. Vitals: T97.7, P 110, /78, SpO2 100% on RA  Labs reviewed. CT head neg. CTA with no significant cervical carotid arterial stenosis. We were asked to admit for work up and evaluation of the above problems. ______________________________________________________________________  DISCHARGE SUMMARY/HOSPITAL COURSE:  for full details see H&P, daily progress notes, labs, consult notes.      Hospital course:  Stuttering TIA in setting of dementia, severe cerebrovascular disease and multiple prior CVAs, still not at baseline: CTA head and neck with moderate to severe diffuse intracranial atherosclerotic vascular disease. Multiple intracranial arterial stenoses but no definite large vessel vascular occlusive change. No significant cervical carotid arterial stenosis. Patent vertebrobasilar system is supplied by dominant right vertebral artery. However stenosis in proximal to midportion of the basilar artery. 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 MRI brain with No intracranial mass, acute hemorrhage or acute infarction. Moderate to large remote infarction of the right parietal and right occipital lobes. Extensive chronic microvascular ischemic change. Moderate to severe cerebral atrophy. Echo demonstrated EF 50-55%. There were no regional wall motion abnormalities. There was diffuse hypokinesis. LDL 64, con't home pravachol. Pt should con't home pradaxa with addition of ASA 81mg per neuro recommendations. Pt to have home PT/OT at the [facility]. UTI, present on admission:  Family reports frequent UTIs with resultant yeast infections r/t frequent Abx use. Urine culture >[id number] E Coli which was sensitive to ciprofloxacin. Pt discharged with cipro and diflucan (family reports that she frequently gets yeast infections with Abx treatment).    Insulin-dependent DM2 controlled with hypoglycemia:  lantus dose decreased due to lower blood sugars while here. CAD s/p CABG, chronic diastolic CHF, paroxysmal afib and benign HTN:  con't outpt pradaxa and lotrel  Dementia with depression: d/c remeron - discussed with Dtr that she needs order to discontinue on discharge  LLE wound, present on admission:  Pt completed abx prior to admission      _______________________________________________________________________  Patient seen and examined by me on discharge day.   Pertinent Findings:  Gen: awake, appropriate, NAD  HEENT: cl kimber, no lesions  Chest: CTA bilaterally, no crackles or wheezes  Cv: RRR, no murmur, no edema  Abd: soft, NT, ND, BS+, no mass  Neuro: CN intact, mild confusion intermittently  _______________________________________________________________________  DISCHARGE MEDICATIONS:   Current Discharge Medication List      START taking these medications    Details   aspirin delayed-release 81 mg tablet Take 1 Tab by mouth daily for 30 days. Qty: 30 Tab, Refills: 0      fluconazole (DIFLUCAN) 200 mg tablet Take 1 Tab by mouth daily for 7 days. FDA advises cautious prescribing of oral fluconazole in pregnancy. Qty: 7 Tab, Refills: 0      ciprofloxacin HCl (CIPRO) 250 mg tablet Take 1 Tab by mouth two (2) times a day for 5 days. Qty: 10 Tab, Refills: 0         CONTINUE these medications which have CHANGED    Details   insulin glargine (LANTUS SOLOSTAR) 100 unit/mL (3 mL) inpn 25 Units by SubCUTAneous route daily. Qty: 15 mL, Refills: 0         CONTINUE these medications which have NOT CHANGED    Details   amLODIPine-benazepril (LOTREL) 5-40 mg per capsule Take 1 Cap by mouth nightly. LOPERAMIDE HCL (IMODIUM PO) Take 2 Tabs by mouth daily as needed for Diarrhea. traZODone (DESYREL) 50 mg tablet TAKE 1/2 TABLET BY MOUTH EVERY NIGHT  Qty: 30 Tab, Refills: 0      dabigatran etexilate (PRADAXA) 150 mg capsule TAKE ONE CAPSULE BY MOUTH EVERY 12 HOURS FOR CEREBROVASCULAR ACCIDENT  Qty: 60 Cap, Refills: 3      pravastatin (PRAVACHOL) 10 mg tablet TAKE 1 TABLET BY MOUTH NIGHTLY. DISCONTINUE LIPITOR. Qty: 90 Tab, Refills: 5    Comments: **Patient requests 90 days supply**  Associated Diagnoses: Cerebrovascular accident (CVA) due to embolism of other precerebral artery (HCC)      acetaminophen (TYLENOL ARTHRITIS PAIN) 650 mg CR tablet Take 1 Tab by mouth two (2) times daily as needed for Pain.   Qty: 30 Tab, Refills: 5    Associated Diagnoses: Pain of both shoulder joints      calcium carbonate (TUMS) 200 mg calcium (500 mg) chew Take 1 Tab by mouth two (2) times daily as needed. Qty: 60 Tab, Refills: 3    Associated Diagnoses: Gastritis without bleeding, unspecified chronicity, unspecified gastritis type      PHENAZOPYRIDINE HCL (PYRIDIUM PO) Take 1 Tab by mouth daily as needed (UTI symptoms). STOP taking these medications       doxycycline (MONODOX) 100 mg capsule Comments:   Reason for Stopping:         mirtazapine (REMERON) 7.5 mg tablet Comments:   Reason for Stopping:         levoFLOXacin (LEVAQUIN) 250 mg tablet Comments:   Reason for Stopping:               My Recommended Diet, Activity, Wound Care, and follow-up labs are listed in the patient's Discharge Insturctions which I have personally completed and reviewed.     _______________________________________________________________________  DISPOSITION:    Home with Family: x   Home with HH/PT/OT/RN: x   SNF/LTC:    MARK:    OTHER:        Condition at Discharge:  Stable  _______________________________________________________________________  Follow up with:   PCP : Lizabeth Samuel MD  Follow-up Information     Follow up With Details Comments Contact Info    Lizabeth Samuel MD On 10/18/2017 3;00pm  routine hospital follow up and UTI follow up [address]  [address] [place] [address] On 11/30/2017 11;30am  Neurology hospital follow-up [address] [place] [address]  [address] [phone]                Total time in minutes spent coordinating this discharge (includes going over instructions, follow-up, prescriptions, and preparing report for sign off to her PCP) :  35 minutes    Signed:  Samreen Real MD

## 2017-10-16 NOTE — PROGRESS NOTES
Discharge to car with daughter by Ba Maldonado R.N.. Daughter to transport pt to [facility]. Daughter has discharge instructions to give to  [facility].

## 2017-10-16 NOTE — PROGRESS NOTES
CM called [facility] at [address] and left a message that the patient is ready for discharge today. CM contacted the daughter and got another number [phone]. CM called and was informed by [facility] that the patient can come at anytime. They will need any prescriptions, discharge summary and MAR. JARED informed her that PT recommended that she receive some home PT and was told to send the order as well and they will contact the home health agency they are contracted with. CM informed her nurse and the daughter. Her daughter will transport her by car with assistance getting the patient in the car. The number to call for report is [phone]. Care Management Interventions  PCP Verified by CM: Yes (3 weeks ago)  Mode of Transport at Discharge: Other (see comment) (family by car )  Transition of Care Consult (CM Consult): Discharge Planning  Discharge Durable Medical Equipment: No (wheelchair )  Physical Therapy Consult: Yes (recommended home health PT)  Occupational Therapy Consult: Yes (no needs )  Speech Therapy Consult: No  Current Support Network: Other (lives in a private assisted living facility)  Confirm Follow Up Transport: Family (by car)  Plan discussed with Pt/Family/Caregiver: Yes (CM spoke with daughter who stated that the patient can go back to the [facility] at discharge.)   CHARLIE Duran RN #[id number]

## 2017-10-16 NOTE — PROGRESS NOTES
Bedside shift change report given to belgica RN (oncoming nurse) by Matthew Melendez RN (offgoing nurse). Report included the following information SBAR, Kardex, ED Summary, [facility] and Recent Results. Zone Phone:   0100      Significant changes during shift:   Pt confused, aggressive and combative, trying to get out of bed. Reoriented pt multiple times that she is in the hospital. 2000-  rollbelt put on. Pt still combative and aggressive, sitter present.     2020-called on call physican to order medication prn, currently awaiting response. 2034- Dr. Lori Reveles ordered Haldol to be given once, IM  2056- Haldol given     2300- pt is calm, cooperative. Rollbelt released, sitter present. Will continue to monitor patient.         Patient Information    Kory Mac  80 y.o.  10/14/2017  2:38 PM by Nilsa Boo MD. Kory Mac was admitted from Home    Problem List    Patient Active Problem List    Diagnosis Date Noted    S/P CABG (coronary artery bypass graft) 3 vessel 09/22/2016    Macular degeneration 09/22/2016    Stroke ([facility]     Status post placement of implantable loop recorder 07/15/2016    Tricuspid valve insufficiency 07/14/2016    Mitral valve insufficiency 07/14/2016    CVA (cerebral vascular accident) ([address] 05/06/2016    PUD (peptic ulcer disease)     Advance care planning 01/28/2016    Unspecified vitamin D deficiency 08/21/2015    TIA (transient ischemic attack) 08/19/2013    Cancer of skin, squamous cell 07/02/2013    Occlusion and stenosis of carotid artery without mention of cerebral infarction 11/27/2012    Edema 11/07/2012    Osteoarthrosis, unspecified whether generalized or localized, unspecified site 05/31/2011    Pain in joint, shoulder region 05/31/2011    Coronary atherosclerosis of native coronary artery 09/07/2010    Essential hypertension, benign 06/01/2010    Pure hypercholesterolemia 06/01/2010    DM type 2 (diabetes mellitus, type 2) ([facility] 06/01/2010    Acute upper respiratory infections of unspecified site 06/01/2010     Past Medical History:   Diagnosis Date    A[facility]fib [facility])     per patient and daughter   [facility]     Arthritis     Bladder infection, chronic     hx    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    third-way heart bypass    CAD (coronary artery disease)     Chronic UTI     Congestive heart failure, unspecified     Depression     Hypercholesterolemia     Hypertension     Hypokalemia     Insomnia     Macular degeneration     Moderate mitral regurgitation     Murmur     NIDDM     daily medicatons for diabetes    Skin cancer     Stroke [facility] 8930,9172,    4 strokes and 1 TIA    Sun-damaged skin     Sunburn, blistering     Unspecified vitamin D deficiency          Core Measures:    CVA: Yes            Activity Status: Bed Rest            DVT prophylaxis:        Patient Safety:    Falls Score Total Score: 4  Safety Level_______  Bed Alarm On? Yes  Sitter?  Yes    Plan for upcoming shift:  Safety checks, Frequent rounding        Discharge Plan: D/c once medically stable    Active Consults:  IP CONSULT TO NEUROLOGY  IP CONSULT TO NEUROLOGY

## 2017-10-16 NOTE — PROGRESS NOTES
Dr Charles Rodriguez notified of prn nitro given for 'B systolic. Noitfed her that pt had refused hs BP meds last night.  Order received to remove nitro and give one extra dose  Daily lotrel as ordered and pt may resume her usual dose tonight

## 2017-10-16 NOTE — HOME CARE
Home Health Care Discharge Planning: [facility]  Face to Face Encounter      NAME: Lovely Rizzo   :  3/16/1930   MRN:  [id number]     Primary Diagnosis:   Stuttering TIA in setting of dementia, severe cerebrovascular disease and multiple prior CVAs, still not at baseline  E Coli UTI, present on admission  Insulin-dependent DM2 controlled  CAD s/p CABG, chronic diastolic CHF, paroxysmal afib and benign HTN  Dementia with depression    Date of Face to Face:  10/16/2017 11:24 AM                                  Face to Face Encounter findings are related to primary reason for home care:   YES    1. I certify that the patient needs intermittent skilled nursing care, physical therapy and/or speech therapy. I will not be following this patient in the Community and Dr. Stella Salazar MD will be responsible for signing the [address] 2. Initial Orders for Care: Skilled Nursing and Physical Therapy    3. I certify that this patient is homebound because of illness or injury, need the aid of supportive devices such as crutches, canes, wheelchairs, and walkers; the use of special transportation; or the assistance of another person in order to leave their place of residence. There exists a normal inability to leave home and leaving home requires a considerable and taxing effort. 4. I certify that this patient is under my care and that I had a Face-to-Face Encounter that meets the physician Face-to-Face Encounter requirements. Document the physical findings from the Face-to-Face Encounter that support the need for skilled services: Has new diagnosis that requires skilled nursing teaching and intervention , Has new medications that requires skilled nursing teaching and monitoring for understanding and compliance  and Has new finding of weakness and altered mobility that requires skilled physical/occupational and/or speech therapy services for evaluation and interventions.      Christiano Wayne Maci Gallo MD  Discharging Physician  Office: [phone]  Fax:   [phone]

## 2017-10-16 NOTE — PROGRESS NOTES
Problem: Mobility Impaired (Adult and Pediatric)  Goal: *Acute Goals and Plan of Care (Insert Text)  Physical Therapy Goals  Initiated 10/16/2017  1. Patient will move from supine to sit and sit to supine , scoot up and down and roll side to side in bed with supervision/set-up within 7 day(s). 2. Patient will transfer from bed to chair and chair to bed with minimal assistance/contact guard assist using the least restrictive device within 7 day(s). 3. Patient will perform sit to stand with minimal assistance/contact guard assist within 7 day(s). 4. Patient will ambulate with minimal assistance/contact guard assist for 30 feet with the least restrictive device within 7 day(s). 6. Patient will improve Fernández Balance score by 7 points within 7 days. PHYSICAL THERAPY EVALUATION- NEURO POPULATION     Patient: Oswaldo Calloway (61 y.o. female)  Date: 10/16/2017  Primary Diagnosis: CVA (cerebral vascular accident) [facility])        Precautions: Fall, bed alarm         ASSESSMENT :  Based on the objective data described below, the patient presents with residual deficits from previous CVA (L UE contracted, L foot drop, L sided weakness), baseline dementia resulting in impaired safety awareness, impaired gait mechanics, and decreased endurance/activity tolerance. Prior to admission, pt was residing at [address] [place] [address] and ambulating short distances w/ quad cane, L AFO, and 1 person assist. Today, pt required Dusty in order to assume seated position EOB w/ intact sitting balance. Pt sit>>stand w/ Dusty and ambulated 35ft w/ min-modAx1 and HHA (quad cane not present in room, pt unable to  RW due to L hand deficits from previous CVA). Pt with significantly narrowed JOSE RAFAEL in addition to antalgic, step-to gait pattern due to L sided weakness (baseline). Mild increase in trunk sway with fair gait stability overall.  Pt fatigued at completion of limited distance ambulation trial. Pt likely close to her baseline status however would benefit from additional skilled therapy to address the above mentioned deficits. Upon discharge, recommend pt return to familiar environment of [facility]. Of note, daughter interested in other adult day care/club rec programs for pt to attending - CM notified. Patient will benefit from skilled intervention to address the above impairments. Patients rehabilitation potential is considered to be Good  Factors which may influence rehabilitation potential include:   [ ]           None noted  [ ]           Mental ability/status  [ ]           Medical condition  [ ]           Home/family situation and support systems  [ ]           Safety awareness  [ ]           Pain tolerance/management  [ ]           Other:        PLAN :  Recommendations and Planned Interventions:  [X]             Bed Mobility Training             [ ]      Neuromuscular Re-Education  [X]             Transfer Training                   [ ]      Orthotic/Prosthetic Training  [X]             Gait Training                         [ ]      Modalities  [X]             Therapeutic Exercises           [ ]      Edema Management/Control  [X]             Therapeutic Activities            [X]      Patient and Family Training/Education  [ ]             Other (comment):  Frequency/Duration: Patient will be followed by physical therapy 4 times a week to address goals. Discharge Recommendations: Return to familiar environment of [facility]  Further Equipment Recommendations for Discharge: None, pt owns necessary equipment       SUBJECTIVE:   Patient stated I like to bowl.       OBJECTIVE DATA SUMMARY:   HISTORY:    Past Medical History:   Diagnosis Date    A-fib [facility])       per patient and daughter   Moustapha Hinds [facility])      Arthritis      Bladder infection, chronic       hx    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     third-way heart bypass    CAD (coronary artery disease)      Chronic UTI      Congestive heart failure, unspecified      Depression      Hypercholesterolemia      Hypertension      Hypokalemia      Insomnia      Macular degeneration      Moderate mitral regurgitation      Murmur      NIDDM       daily medicatons for diabetes    Skin cancer      Stroke ([facility] 2231,3069,     4 strokes and 1 TIA    Sun-damaged skin      Sunburn, blistering      Unspecified vitamin D deficiency       Past Surgical History:   Procedure Laterality Date    HX CORONARY ARTERY BYPASS GRAFT   12/9/09     3v    HX CORONARY ARTERY BYPASS GRAFT         3 vessel    HX HAMMER TOE REPAIR        HX HYSTERECTOMY         at age 29    HX MOHS PROCEDURES Left 01/16/2017     SCC left medial calf by Dr. Rose London         bladder repair    IMPLANT  LOOP RECORDER   7/15/2016          ORAL SURGERY PROCEDURE         implants     Prior Level of Function/Home Situation: Pt lives at [facility]. Daughter reports that pt is ambulatory for short distances (~25ft) with quad cane, L AFO, and 1 person assistance. Majority of mobility occurs at [facility] level. Pt previously receiving OP PT at [address] [place] [address] however ran out of rehab days.  Pt also was attending [facility] with daughter stating that 2 weeks ago, pt was ambulatory with platform walker, L AFO, and 1 person assist for up to 400ft  Personal factors and/or comorbidities impacting plan of care:      Home Situation  [address] Name: [facility]  # Steps to Enter: 0  One/Two Story Residence: One story  Living Alone: No  Support Systems: Assisted living, Child(cristino), Family member(s)  Patient Expects to be Discharged to[de-identified] Assisted living  Current DME Used/Available at Home: christelle Camp, Wheelchair     EXAMINATION/PRESENTATION/DECISION MAKING:   Critical Behavior:  Neurologic State: Alert  Orientation Level: Oriented to person, Oriented to place, Oriented to time  Cognition: Poor safety awareness  Safety/Judgement: Lack of insight into deficits  Hearing: Auditory  Auditory Impairment: Deaf  Skin:  Intact  Edema: None noted   Range Of Motion:  AROM: Generally decreased, functional (L UE contracted from previous CVA)                       Strength:    Strength: Generally decreased, functional (L foot drop, L UE contracted from previous CVA)                    Tone & Sensation:   Tone: Normal (except for L UE)                              Coordination:  Coordination: Generally decreased, functional  Vision:      Functional Mobility:  Bed Mobility:     Supine to Sit: Minimum assistance     Scooting: Supervision  Transfers:  Sit to Stand: Minimum assistance  Stand to Sit: Minimum assistance        Bed to Chair: Minimum assistance; Moderate assistance              Balance:   Sitting: Intact  Standing: Impaired  Standing - Static: Fair  Standing - Dynamic : Fair  Ambulation/Gait Training:  Distance (ft): 35 Feet (ft)  Assistive Device: Gait belt (HHAx1 )  Ambulation - Level of Assistance: Minimal assistance;Assist x1; Moderate assistance        Gait Abnormalities: Decreased step clearance; Step to gait; Antalgic;Trunk sway increased (L foot drop (AFO))        Base of Support: Narrowed     Speed/Nikki: Pace decreased (<100 feet/min); Slow  Step Length: Left shortened;Right shortened                               Functional Measure  Fernández Balance Test:      Sitting to Standin  Standing Unsupported: 0  Sitting with Back Unsupported: 4  Standing to Sittin  Transfers: 1  Standing Unsupported with Eyes Closed: 0  Standing Unsupported with Feet Together: 0  Reach Forward with Outstretched Arm: 0   Object: 0  Turn to Look Over Shoulders: 0  Turn 360 Degrees: 0  Alternate Foot on Step/Stool: 0  Standing Unsupported One Foot in Front: 0  Stand on One Le  Total: 6             56=Maximum possible score;   0-20=High fall risk  21-40=Moderate fall risk   41-56=Low fall risk      Fernández Balance Test and G-code impairment scale:  Percentage of Impairment CH     0%    CI     1-19% CJ     20-39% CK     40-59% CL     60-79% CM     80-99% CN      100%   Fernández   Score 0-56 56 45-55 34-44 23-33 12-22 1-11 0         G codes: In compliance with CMSs Claims Based Outcome Reporting, the following G-code set was chosen for this patient based on their primary functional limitation being treated: The outcome measure chosen to determine the severity of the functional limitation was the [employer] with a score of 6/56 which was correlated with the impairment scale. · Mobility - Walking and Moving Around:               - CURRENT STATUS:    CM - 80%-99% impaired, limited or restricted               - GOAL STATUS:           CK - 40%-59% impaired, limited or restricted               - D/C STATUS:                       ---------------To be determined---------------       Physical Therapy Evaluation Charge Determination   History Examination Presentation Decision-Making   LOW Complexity : Zero comorbidities / personal factors that will impact the outcome / POC LOW Complexity : 1-2 Standardized tests and measures addressing body structure, function, activity limitation and / or participation in recreation  LOW Complexity : Stable, uncomplicated  LOW Complexity : FOTO score of       Based on the above components, the patient evaluation is determined to be of the following complexity level: LOW         Pain:  Pain Scale 1: Numeric (0 - 10)  Pain Intensity 1: 0              Activity Tolerance:   VSS on RA  Please refer to the flowsheet for vital signs taken during this treatment. After treatment:   [ ]     Patient left in no apparent distress sitting up in chair  [ ]     Patient left in no apparent distress in bed  [ ]     Call bell left within reach  [ ]     Nursing notified  [ ]     Caregiver present  [ ]     Bed alarm activated      COMMUNICATION/EDUCATION:   The patients plan of care was discussed with: Registered Nurse and . Patient was educated regarding Her deficit(s) of slurred speech, confusion as this relates to Her diagnosis of CVA. She demonstrated Good understanding as evidenced by verbalization. Patient and/or family was verbally educated on the BE FAST acronym for signs/symptoms of CVA and TIA. BE FAST was written on patient's communication board  for visual education and reinforcement. All questions answered with patient indicating good understanding.      [X]  Fall prevention education was provided and the patient/caregiver indicated understanding. [X]  Patient/family have participated as able in goal setting and plan of care. [X]  Patient/family agree to work toward stated goals and plan of care. [ ]  Patient understands intent and goals of therapy, but is neutral about his/her participation. [ ]  Patient is unable to participate in goal setting and plan of care.      Thank you for this referral.  Kim Sultana, PT, DPT   Time Calculation: 20 mins

## 2017-10-16 NOTE — PROGRESS NOTES
Care Management Interventions  PCP Verified by CM: Yes (3 weeks ago)  Mode of Transport at Discharge: Other (see comment) (family by car )  Transition of Care Consult (CM Consult): Home Health (arranged by the facility)  [address] No  Reason Outside [place]: Patient already serviced by other home care/hospice agency  Discharge Durable Medical Equipment: No (wheelchair )  Physical Therapy Consult: Yes (recommended home health PT)  Occupational Therapy Consult: Yes (no needs )  Speech Therapy Consult: No  Current Support Network: Other (lives in a private assisted living facility)  Confirm Follow Up Transport: Family (by car)  Plan discussed with Pt/Family/Caregiver: Yes (CM spoke with daughter who stated that the patient can go back to the [facility] at discharge.)  Discharge Location  Discharge Placement: Home with home health   The discharge summary and [facility] faxed to [facility]. The daughter will transport the patient by car. Follow up appointments were made.  Adela Lea RN #[id number]

## 2017-10-16 NOTE — PROGRESS NOTES
The patient is a 80year old female who was admitted for a new CVA with a history of T2DM,  CAD s/p CABG, HTN, Chronic diastolic HF/Atrial fib, and a wound on the left leg.   Her daughter informed AURORA that she can return to the [facility] at discharge. She has been taking the patient to the [facility] 2 days a week but cannot afford that now and asked about the [employer] program. She also stated that the patient will run out of her money within the next few months and does not know what to do then. AURORA gave her the information about the [employer] program, a copy of the Medicaid application, recommended she do the referral asap and told her about the spin down program through [place]. AURORA informed her Medicaid can pay for up t 40 hours a week care at home, long term care and could pay for the [facility] if she lived with the daughter. Daisy RN #[id number]  .

## 2017-10-16 NOTE — PROGRESS NOTES
TRANSFER - OUT REPORT:    Verbal report given to Johanna(name) on [place]  being transferred to [facility](unit) for routine progression of care       Report consisted of patients Situation, Background, Assessment and   Recommendations(SBAR). Information from the following report(s) SBAR was reviewed with the receiving nurse. Lines:   Peripheral IV 10/15/17 Right Forearm (Active)   Site Assessment Clean, dry, & intact 10/15/2017  7:43 PM   Phlebitis Assessment 0 10/15/2017  7:43 PM   Infiltration Assessment 0 10/15/2017  7:43 PM   Dressing Status Clean, dry, & intact 10/15/2017  7:43 PM   Dressing Type Transparent 10/15/2017  7:43 PM   Hub Color/Line Status Blue 10/15/2017  7:43 PM        Opportunity for questions and clarification was provided.       Patient transported with: daughter

## 2017-10-16 NOTE — PROGRESS NOTES
Pt confused, aggressive and combative, trying to get out of bed. Reoriented pt multiple times that she is in the hospital. 2000-  rollbelt put on. Pt still combative and aggressive, sitter present. 2020-called on call physican to order medication prn, currently awaiting response. 2034- Dr. Deirdre Zimmerman ordered Haldol to be given once, IM  2056- Haldol given    2300- pt is calm, cooperative. Rollbelt released, sitter present. Will continue to monitor patient.

## 2017-10-17 NOTE — PROGRESS NOTES
Carolanne Olszewski 80 y.o. [place]  [facility] [place] and Daily report 10/17/2017. Patient discharged from [facility] for CVA from dates: 10/14-10/16/2017. Per patient's discharge instructions patient's hospital follow up appointment was discharged. RRAT score: 30 High    Nurse Navigator(NN) contacted the [address] [place] [address] spoke to [facility] patient's nurse by telephone to perform post hospital discharge assessment. Verified as patient with 2 identifiers. Provided introduction to self, and explanation of the Nurses Navigator role. Call Information: Flor informed this writer patient had returned to facility and will be followed by home health for PT, OT . Inquired if knew why the patient follow up appointment scheduled with PCP was cancelled. Flor stated that patient's POA daughter Yunior Hubbard schedules and provides transport. Outgoing call placed to [address] [place] [address]. Patient identified utilizing name and . Introduced self and role of ambulatory navigator and contact information given. Ms Waldemar Johnson stated that the appointment was cancelled because she was not sure if patient could tolerate trip to the office. Explained the importance of the follow up appointment. Ms Waldemar Johnson stated she would need to speak with her sister before rescheduling follow up appointment. Will follow up with family and contact staff from the [address] [place] [address]. Red Flags:     Discharge Instructions :  Reviewed discharge instructions with patient. Patient verbalizes understanding of discharge instructions and follow-up care.        Medical History:     Past Medical History:   Diagnosis Date    A-fib [facility])     per patient and daughter   Suzan Tapia [facility])     Arthritis     Bladder infection, chronic     hx    Blockage of Heart Bypass Graft 2009    third-way heart bypass    CAD (coronary artery disease)     Chronic UTI     Congestive heart failure, unspecified     Depression     Hypercholesterolemia     Hypertension     Hypokalemia     Insomnia     Macular degeneration     Moderate mitral regurgitation     Murmur     NIDDM     daily medicatons for diabetes    Skin cancer     [facility] 2984,5245,    4 strokes and 1 TIA    Sun-damaged skin     Sunburn, blistering     Unspecified vitamin D deficiency        Labs Reviewed:  Recent Labs      10/16/17   0413   WBC  8.9   HGB  13.9   HCT  40.3   PLT  159     Recent Labs      10/16/17   0413  10/14/17   1548   NA  140   --    K  4.0   --    CL  106   --    CO2  26   --    GLU  158*   --    BUN  9   --    CREA  0.50*   --    CA  8.9   --    INR   --   1.8*     No components found for: GLPOC  No results for input(s): PH, PCO2, PO2, HCO3, FIO2 in the last 72 hours.   Recent Labs      10/14/17   1548   INR  1.8*

## 2017-10-18 NOTE — PROGRESS NOTES
Follow up call placed to [facility] caregiver at [facility]. Patient identified utilizing 2 identifiers. Flor reports patient is doing okay able to tolerate diabetic diet, and will be follow by [facility] for PT and OT.   Awaiting response from patient's to schedule follow up appointment with PCP

## 2017-10-24 NOTE — PROGRESS NOTES
Chief Complaint   Patient presents with    Skin Exam     1. Have you been to the ER, urgent care clinic since your last visit? Hospitalized since your last visit? [facility] 10/14/2017-10/16/2017 for a mini stroke. 2. Have you seen or consulted any other health care providers outside of the [address] [place] [address] since your last visit? Include any pap smears or colon screening.  No

## 2017-10-24 NOTE — MR AVS SNAPSHOT
Visit Information Date & Time Provider Department Dept. Phone Encounter #  
 10/24/2017 11:30 AM Rock Bradley NP [place] [address] [phone] [id number] Your Appointments 11/30/2017 11:30 AM  
New Patient with Russ Giron NP [facility]) Appt Note: Hospital follow-up [facility], 
[address] [place] [address], [address] [phone]  
[address] [facility], [address] [phone]  
  
    
 1/3/2018  9:45 AM  
ROUTINE CARE with Yesi Munroe MD  
[facility]) Appt Note: fu 4m; R/S 10/05/17 tpl  
 [address] [facility] [address] [facility] [address]  
[phone]  
  
   
 [address] [facility] [address] Upcoming Health Maintenance Date Due Pneumococcal 65+ High/Highest Risk (2 of 2 - PPSV23) 1/1/2006 DTaP/Tdap/Td series (1 - Tdap) 2/13/2015 EYE EXAM RETINAL OR DILATED Q1 8/6/2016 FOOT EXAM Q1 8/21/2016 MICROALBUMIN Q1 9/3/2016 INFLUENZA AGE 9 TO ADULT 8/1/2017 GLAUCOMA SCREENING Q2Y 8/6/2017 MEDICARE YEARLY EXAM 9/23/2017 HEMOGLOBIN A1C Q6M 4/5/2018 LIPID PANEL Q1 10/15/2018 Allergies as of 10/24/2017  Review Complete On: 10/24/2017 By: Bhavin Goff Severity Noted Reaction Type Reactions Aleve [Naproxen Sodium] Medium 02/15/2011   Side Effect Other (comments) Caused stomach ulcer and thrush Eliquis [Apixaban]  08/06/2016    Other (comments)  
 dizziness Labetalol  05/09/2016    Other (comments)  
 dizzyness Metformin  08/21/2015    Diarrhea With both IR and ER Plavix [Clopidogrel]  08/06/2016    Other (comments) Pt. Gladys Milton it was not effective Current Immunizations  Reviewed on 5/8/2016 Name Date Influenza Vaccine 10/9/2013 Pneumococcal Vaccine (Unspecified Type) 1/1/2001  Td, Adsorbed PF 2/12/2015 12:17 PM  
 Zoster Vaccine, Live 10/9/2013 Not reviewed this visit Vitals BP Pulse Temp Resp Height(growth percentile) Weight(growth percentile) 120/64 (BP 1 Location: Right arm, BP Patient Position: Sitting) 71 97.9 °F (36.6 °C) (Oral) 18 5' (1.524 m) 116 lb (52.6 kg) SpO2 BMI OB Status Smoking Status 99% 22.65 kg/m2 Hysterectomy Never Smoker BMI and BSA Data Body Mass Index Body Surface Area  
 22.65 kg/m 2 1.49 m 2 Preferred Pharmacy Pharmacy Name Phone [phone] [phone], [address] AT [place] [address] [phone] Your Updated Medication List  
  
   
This list is accurate as of: 10/24/17 11:33 AM.  Always use your most recent med list.  
  
  
  
  
 acetaminophen 650 mg CR tablet Commonly known as:  TYLENOL ARTHRITIS PAIN Take 1 Tab by mouth two (2) times daily as needed for Pain. aspirin delayed-release 81 mg tablet Take 1 Tab by mouth daily for 30 days. BD INSULIN PEN NEEDLE UF MINI 31 gauge x 3/16\" Ndle Generic drug:  Insulin Needles (Disposable) USE TO INJECT LANTUS EVERY DAY  
  
 calcium carbonate 200 mg calcium (500 mg) Chew Commonly known as:  TUMS Take 1 Tab by mouth two (2) times daily as needed. dabigatran etexilate 150 mg capsule Commonly known as:  PRADAXA TAKE ONE CAPSULE BY MOUTH EVERY 12 HOURS FOR CEREBROVASCULAR ACCIDENT IMODIUM PO Take 2 Tabs by mouth daily as needed for Diarrhea. insulin glargine 100 unit/mL (3 mL) Inpn Commonly known as:  LANTUS SOLOSTAR  
25 Units by SubCUTAneous route daily. levoFLOXacin 250 mg tablet Commonly known as:  LEVAQUIN  
  
 LOTREL 5-40 mg per capsule Generic drug:  amLODIPine-benazepril Take 1 Cap by mouth nightly. mirtazapine 7.5 mg tablet Commonly known as:  REMERON  
TK 1 T PO Q NIGHT  
  
 pravastatin 10 mg tablet Commonly known as:  PRAVACHOL  
 TAKE 1 TABLET BY MOUTH NIGHTLY. DISCONTINUE LIPITOR. PYRIDIUM PO Take 1 Tab by mouth daily as needed (UTI symptoms). traZODone 50 mg tablet Commonly known as:  DESYREL  
TAKE 1/2 TABLET BY MOUTH EVERY NIGHT Patient Instructions Self Skin Exam and Sunscreens Early detection and treatment is essential in the treatment of all forms of skin cancer. If caught early, all forms of skin cancer are curable. In addition to your regular visits, you should perform a monthly skin examination. Over time, you become familiar with what is normally found on your skin and can identify new or suspicious spots. One of the screening tools you can use to assess your skin is to follow the ABCDEs: 
 
A= Asymmetry (One half is unlike the other half) B= Border (An irregular, scalloped or poorly defined edge) C= Color (Is varied from one area to another, has shades of tan, brown/ black,       white, red or blue) D= Diameter (Spots larger than 6mm or a pencil eraser) E= Evolving (New spots or one that is changing in size, shape, or color) A follow- up interval will be customized based on your history of skin cancer or level of skin damage and risk factors. In any case, if you notice a suspicious or new spot, an appointment should be arranged between regular visits. Everyone should use sunscreen and sun-safe practices, which is especially important for those with a personal or family history of skin cancer. Suggestions for this include: 1. Use daily moisturizers containing SPF 30 or higher. 2. Wear long sleeve clothing with UPF ratings and a broad-brimmed hat. 3. Apply sunscreen with SPF 30 or higher to all sun exposed areas if you are going to be in the sun. A broad spectrum UVA/ UVB sunscreen is best.  Dont forget to REAPPLY every two hours or more often if swimming or sweating! 4. Avoid outside activities during peak sun hours, especially in the summer (10am- 2pm). 5. DO NOT use tanning beds. Using sunscreen and sun-safe practices can help reduce the likelihood of developing skin cancer or additional skin cancers in those previously diagnosed. Introducing [facility] SERVICES! Dear Flavio Castle: 
Thank you for requesting a [employer] account. Our records indicate that you already have an active [employer] account. You can access your account anytime at [email or web address] Did you know that you can access your hospital and ER discharge instructions at any time in [employer]? You can also review all of your test results from your hospital stay or ER visit. Additional Information If you have questions, please visit the Frequently Asked Questions section of the [employer] website at [email or web address] Remember, [employer] is NOT to be used for urgent needs. For medical emergencies, dial 911. Now available from your iPhone and Android! Please provide this summary of care documentation to your next provider. Your primary care clinician is listed as Ranjana Holt. If you have any questions after today's visit, please call [phone].

## 2017-10-24 NOTE — PROGRESS NOTES
Name: Sita Ayon       Age: 80 y.o. Date: 10/24/2017    Chief Complaint:   Chief Complaint   Patient presents with    Skin Exam       Subjective:    HPI  Ms. Sita Ayon is a 80 y.o. female who presents for a skin exam.  The patient's last skin exam was on 4/24/17 and the patient does have current complaints related to her skin. She reports improvement but not resolution of the lesions treated with cryotherapy at last visit on the right neck and left cheek. Her daughter also reports the patient had a stroke since last visit and also has a wound on her left leg that the home health nurse mentioned has an odor. The patient's pertinent skin history includes : SCC and SCCi    ROS: Constitutional: Negative. Dermatological : positive for - skin lesion changes      Social History     Social History    Marital status:      Spouse name: N/A    Number of children: 4    Years of education: 15     Occupational History    Not on file. Social History Main Topics    Smoking status: Never Smoker    Smokeless tobacco: Never Used    Alcohol use 0.0 oz/week     0 Standard drinks or equivalent per week      Comment: once a year    Drug use: No    Sexual activity: Not Currently     Other Topics Concern     Service No    Blood Transfusions No    Caffeine Concern No    Occupational Exposure No    Hobby Hazards No    Sleep Concern Yes    Stress Concern Yes    Weight Concern No    Special Diet Yes    Back Care Yes    Exercise Yes    Bike Helmet No    Seat Belt Yes    Self-Exams No     Social History Narrative    Lives in [facility] alone.  since 2008. Had one daughter who was murdered in March 2012. Has 2 living daughters and 1 living son. Worked for the [employer] in [place]. Likes [employer]. Moved from [place] in 2008.        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    third-way heart bypass    CAD (coronary artery disease)     Chronic UTI     Congestive heart failure, unspecified     Depression     Hypercholesterolemia     Hypertension     Hypokalemia     Insomnia     Macular degeneration     Moderate mitral regurgitation     Murmur     NIDDM     daily medicatons for diabetes    Skin cancer     Stroke ([facility] [address] [phone],    4 strokes and 1 TIA    Sun-damaged skin     Sunburn, blistering     Unspecified vitamin D deficiency        Past Surgical History:   Procedure Laterality Date    HX CORONARY ARTERY BYPASS GRAFT  12/9/09    3v    HX CORONARY ARTERY BYPASS GRAFT      3 vessel    HX HAMMER TOE REPAIR      HX HYSTERECTOMY      at age 29    HX MOHS PROCEDURES Left 01/16/2017    SCC left medial calf by Dr. Ana Ayon      bladder repair    IMPLANT  LOOP RECORDER  7/15/2016         ORAL SURGERY PROCEDURE      implants       Current Outpatient Prescriptions   Medication Sig Dispense Refill    aspirin delayed-release 81 mg tablet Take 1 Tab by mouth daily for 30 days. 30 Tab 0    insulin glargine (LANTUS SOLOSTAR) 100 unit/mL (3 mL) inpn 25 Units by SubCUTAneous route daily. 15 mL 0    amLODIPine-benazepril (LOTREL) 5-40 mg per capsule Take 1 Cap by mouth nightly.  LOPERAMIDE HCL (IMODIUM PO) Take 2 Tabs by mouth daily as needed for Diarrhea.  PHENAZOPYRIDINE HCL (PYRIDIUM PO) Take 1 Tab by mouth daily as needed (UTI symptoms).  dabigatran etexilate (PRADAXA) 150 mg capsule TAKE ONE CAPSULE BY MOUTH EVERY 12 HOURS FOR CEREBROVASCULAR ACCIDENT 60 Cap 3    acetaminophen (TYLENOL ARTHRITIS PAIN) 650 mg CR tablet Take 1 Tab by mouth two (2) times daily as needed for Pain.  30 Tab 5  calcium carbonate (TUMS) 200 mg calcium (500 mg) chew Take 1 Tab by mouth two (2) times daily as needed. 60 Tab 3    mirtazapine (REMERON) 7.5 mg tablet TK 1 T PO Q NIGHT  4    BD INSULIN PEN NEEDLE UF MINI 31 gauge x 3/16\" ndle USE TO INJECT LANTUS EVERY DAY  0    levoFLOXacin (LEVAQUIN) 250 mg tablet   0    traZODone (DESYREL) 50 mg tablet TAKE 1/2 TABLET BY MOUTH EVERY NIGHT 30 Tab 0    pravastatin (PRAVACHOL) 10 mg tablet TAKE 1 TABLET BY MOUTH NIGHTLY. DISCONTINUE LIPITOR. 90 Tab 5       Allergies   Allergen Reactions    Aleve [Naproxen Sodium] Other (comments)     Caused stomach ulcer and thrush    Eliquis [Apixaban] Other (comments)     dizziness    Labetalol Other (comments)     dizzyness    Metformin Diarrhea     With both IR and ER     Plavix [Clopidogrel] Other (comments)     Pt. Said it was not effective         Objective:    Visit Vitals    /64 (BP 1 Location: Right arm, BP Patient Position: Sitting)    Pulse 71    Temp 97.9 °F (36.6 °C) (Oral)    Resp 18    Ht 5' (1.524 m)    Wt 52.6 kg (116 lb)    SpO2 99%    BMI 22.65 kg/m2       Silverio Snider is a 80 y.o. female who appears well and in no distress. She is awake, alert, and oriented. There is no preauricular, submandibular, or cervical lymphadenopathy. I examined her face, neck, chest, forearms/ hands and lower legs. There are scattered seborrheic keratoses, some which are darkly pigmented. There is a thin scaled AK on the right neck x2, left cheek and right nasal dorsum. There are well healed scars on the legs, dry and scaly but no evidence of lesion recurrence. There is a lichenified SK on the left cheek and left forearm. No new skin cancers are identified. There is a scabbed area on the left anterior shin with scant drainage, no surrounding signs of cellulitis and no profound odor. Assessment/Plan:    1. Actinic Keratoses. The diagnosis of this precancerous lesion related to sun exposure was reviewed. Verbal consent was obtained. I treated 4 lesions with cryotherapy and post-cryotherapy care was reviewed. [facility]   OFFICE PROCEDURE PROGRESS NOTE   Chart reviewed for the following:   I, Osvaldo Claytonling Anniegenny, have reviewed the History, Physical and updated the Allergic reactions for Oswaldo Reeks. TIME OUT performed immediately prior to start of procedure:   I, Judy STANLEY Sandoval Brand, have performed the following reviews on Oswaldo Reeks   prior to the start of the procedure:     * Patient was identified by name and date of birth   * Agreement on procedure being performed was verified   * Risks and Benefits explained to the patient   * Procedure site verified and marked as necessary   * Patient was positioned for comfort   * Verbal consent was obtained    Time: 1155  Date of procedure: 10/24/2017  Procedure performed by: Fredy Mccloud. Nikole Howe DNP  Provider assisted by: none   Patient assisted by: self   How tolerated by patient: tolerated the procedure well with no complications   Comments: none    2. Personal history of skin cancer. I discussed sun protection, sunscreen use, the warning signs of skin cancer, the need for self-skin examinations, and the need for regular practitioner exams every 1 year. The patient should follow up sooner as needed if new, changing, or symptomatic skin lesions arise. 3. Seborrheic keratoses. The diagnosis was reviewed and the patient was reassured that no treatment is needed for these benign lesions. 4. Wound left anterior shin. Continue dressings, add vinegar soaks.

## 2017-10-30 NOTE — TELEPHONE ENCOUNTER
Called and explained that \"XR CHEST PORT\" is actually a portable chest xray and has nothing to do with a chest port.   Ms Page expressed understanding and had no further questions

## 2017-10-30 NOTE — TELEPHONE ENCOUNTER
Please call Kelvin Borrego Page back - she has questions about a chest port that Dr. Awa Arce ordered.   Call here at [phone]

## 2018-01-01 ENCOUNTER — HOME CARE VISIT (OUTPATIENT)
Dept: SCHEDULING | Facility: HOME HEALTH | Age: 83
End: 2018-01-01
Payer: MEDICARE

## 2018-01-01 ENCOUNTER — APPOINTMENT (OUTPATIENT)
Dept: GENERAL RADIOLOGY | Age: 83
DRG: 872 | End: 2018-01-01
Attending: EMERGENCY MEDICINE
Payer: MEDICARE

## 2018-01-01 ENCOUNTER — APPOINTMENT (OUTPATIENT)
Dept: CT IMAGING | Age: 83
DRG: 872 | End: 2018-01-01
Attending: EMERGENCY MEDICINE
Payer: MEDICARE

## 2018-01-01 ENCOUNTER — HOSPICE ADMISSION (OUTPATIENT)
Dept: HOSPICE | Facility: HOSPICE | Age: 83
End: 2018-01-01
Payer: MEDICARE

## 2018-01-01 ENCOUNTER — HOSPITAL ENCOUNTER (INPATIENT)
Age: 83
LOS: 5 days | DRG: 951 | End: 2018-02-10
Attending: INTERNAL MEDICINE | Admitting: INTERNAL MEDICINE
Payer: OTHER MISCELLANEOUS

## 2018-01-01 ENCOUNTER — HOME CARE VISIT (OUTPATIENT)
Dept: HOSPICE | Facility: HOSPICE | Age: 83
End: 2018-01-01
Payer: MEDICARE

## 2018-01-01 ENCOUNTER — APPOINTMENT (OUTPATIENT)
Dept: ULTRASOUND IMAGING | Age: 83
DRG: 872 | End: 2018-01-01
Attending: EMERGENCY MEDICINE
Payer: MEDICARE

## 2018-01-01 ENCOUNTER — HOSPITAL ENCOUNTER (INPATIENT)
Age: 83
LOS: 1 days | Discharge: HOSPICE | DRG: 872 | End: 2018-02-05
Attending: EMERGENCY MEDICINE | Admitting: INTERNAL MEDICINE
Payer: MEDICARE

## 2018-01-01 ENCOUNTER — HOME CARE VISIT (OUTPATIENT)
Dept: HOSPICE | Facility: HOSPICE | Age: 83
End: 2018-01-01

## 2018-01-01 ENCOUNTER — PATIENT OUTREACH (OUTPATIENT)
Dept: INTERNAL MEDICINE CLINIC | Age: 83
End: 2018-01-01

## 2018-01-01 VITALS
DIASTOLIC BLOOD PRESSURE: 65 MMHG | RESPIRATION RATE: 20 BRPM | BODY MASS INDEX: 22.65 KG/M2 | SYSTOLIC BLOOD PRESSURE: 113 MMHG | WEIGHT: 116 LBS | HEART RATE: 108 BPM | TEMPERATURE: 97.9 F | OXYGEN SATURATION: 99 %

## 2018-01-01 VITALS
DIASTOLIC BLOOD PRESSURE: 41 MMHG | HEART RATE: 83 BPM | TEMPERATURE: 99.7 F | OXYGEN SATURATION: 90 % | RESPIRATION RATE: 12 BRPM | SYSTOLIC BLOOD PRESSURE: 125 MMHG

## 2018-01-01 DIAGNOSIS — R13.12 OROPHARYNGEAL DYSPHAGIA: ICD-10-CM

## 2018-01-01 DIAGNOSIS — N30.01 ACUTE CYSTITIS WITH HEMATURIA: ICD-10-CM

## 2018-01-01 DIAGNOSIS — A41.9 SEPSIS, DUE TO UNSPECIFIED ORGANISM: Primary | ICD-10-CM

## 2018-01-01 LAB
ABO + RH BLD: NORMAL
ALBUMIN SERPL-MCNC: 3.2 G/DL (ref 3.5–5)
ALBUMIN/GLOB SERPL: 0.9 {RATIO} (ref 1.1–2.2)
ALP SERPL-CCNC: 184 U/L (ref 45–117)
ALT SERPL-CCNC: 162 U/L (ref 12–78)
ANION GAP SERPL CALC-SCNC: 8 MMOL/L (ref 5–15)
APPEARANCE UR: ABNORMAL
AST SERPL-CCNC: 114 U/L (ref 15–37)
ATRIAL RATE: 94 BPM
BACTERIA SPEC CULT: ABNORMAL
BACTERIA SPEC CULT: NORMAL
BACTERIA URNS QL MICRO: ABNORMAL /HPF
BASOPHILS # BLD: 0 K/UL (ref 0–0.1)
BASOPHILS # BLD: 0 K/UL (ref 0–0.1)
BASOPHILS NFR BLD: 0 % (ref 0–1)
BASOPHILS NFR BLD: 0 % (ref 0–1)
BILIRUB SERPL-MCNC: 4.2 MG/DL (ref 0.2–1)
BILIRUB UR QL CFM: POSITIVE
BLD PROD TYP BPU: NORMAL
BLD PROD TYP BPU: NORMAL
BLOOD GROUP ANTIBODIES SERPL: NORMAL
BPU ID: NORMAL
BPU ID: NORMAL
BUN SERPL-MCNC: 15 MG/DL (ref 6–20)
BUN/CREAT SERPL: 19 (ref 12–20)
CALCIUM SERPL-MCNC: 8.9 MG/DL (ref 8.5–10.1)
CALCULATED P AXIS, ECG09: 63 DEGREES
CALCULATED R AXIS, ECG10: 61 DEGREES
CALCULATED T AXIS, ECG11: 26 DEGREES
CC UR VC: ABNORMAL
CHLORIDE SERPL-SCNC: 103 MMOL/L (ref 97–108)
CO2 SERPL-SCNC: 25 MMOL/L (ref 21–32)
COLOR UR: ABNORMAL
CREAT SERPL-MCNC: 0.78 MG/DL (ref 0.55–1.02)
CROSSMATCH RESULT,%XM: NORMAL
CROSSMATCH RESULT,%XM: NORMAL
DIAGNOSIS, 93000: NORMAL
DIFFERENTIAL METHOD BLD: ABNORMAL
DIFFERENTIAL METHOD BLD: ABNORMAL
EOSINOPHIL # BLD: 0 K/UL (ref 0–0.4)
EOSINOPHIL # BLD: 0 K/UL (ref 0–0.4)
EOSINOPHIL NFR BLD: 0 % (ref 0–7)
EOSINOPHIL NFR BLD: 0 % (ref 0–7)
EPITH CASTS URNS QL MICRO: ABNORMAL /LPF
ERYTHROCYTE [DISTWIDTH] IN BLOOD BY AUTOMATED COUNT: 14.4 % (ref 11.5–14.5)
ERYTHROCYTE [DISTWIDTH] IN BLOOD BY AUTOMATED COUNT: 14.5 % (ref 11.5–14.5)
GLOBULIN SER CALC-MCNC: 3.4 G/DL (ref 2–4)
GLUCOSE BLD STRIP.AUTO-MCNC: 163 MG/DL (ref 65–100)
GLUCOSE BLD STRIP.AUTO-MCNC: 265 MG/DL (ref 65–100)
GLUCOSE BLD STRIP.AUTO-MCNC: 323 MG/DL (ref 65–100)
GLUCOSE SERPL-MCNC: 197 MG/DL (ref 65–100)
GLUCOSE UR STRIP.AUTO-MCNC: NEGATIVE MG/DL
HCT VFR BLD AUTO: 21.9 % (ref 35–47)
HCT VFR BLD AUTO: 39 % (ref 35–47)
HEMOCCULT STL QL: POSITIVE
HGB BLD-MCNC: 13.2 G/DL (ref 11.5–16)
HGB BLD-MCNC: 7 G/DL (ref 11.5–16)
HGB UR QL STRIP: ABNORMAL
IMM GRANULOCYTES # BLD: 0.1 K/UL (ref 0–0.04)
IMM GRANULOCYTES # BLD: 0.1 K/UL (ref 0–0.04)
IMM GRANULOCYTES NFR BLD AUTO: 1 % (ref 0–0.5)
IMM GRANULOCYTES NFR BLD AUTO: 1 % (ref 0–0.5)
INR PPP: 1.8 (ref 0.9–1.1)
KETONES UR QL STRIP.AUTO: ABNORMAL MG/DL
LACTATE BLD-SCNC: 3.5 MMOL/L (ref 0.4–2)
LACTATE SERPL-SCNC: 2.3 MMOL/L (ref 0.4–2)
LACTATE SERPL-SCNC: 2.3 MMOL/L (ref 0.4–2)
LEUKOCYTE ESTERASE UR QL STRIP.AUTO: ABNORMAL
LYMPHOCYTES # BLD: 0.4 K/UL (ref 0.8–3.5)
LYMPHOCYTES # BLD: 0.7 K/UL (ref 0.8–3.5)
LYMPHOCYTES NFR BLD: 3 % (ref 12–49)
LYMPHOCYTES NFR BLD: 6 % (ref 12–49)
MCH RBC QN AUTO: 30.3 PG (ref 26–34)
MCH RBC QN AUTO: 31 PG (ref 26–34)
MCHC RBC AUTO-ENTMCNC: 32 G/DL (ref 30–36.5)
MCHC RBC AUTO-ENTMCNC: 33.8 G/DL (ref 30–36.5)
MCV RBC AUTO: 91.5 FL (ref 80–99)
MCV RBC AUTO: 94.8 FL (ref 80–99)
MONOCYTES # BLD: 0.6 K/UL (ref 0–1)
MONOCYTES # BLD: 0.7 K/UL (ref 0–1)
MONOCYTES NFR BLD: 5 % (ref 5–13)
MONOCYTES NFR BLD: 6 % (ref 5–13)
NEUTS SEG # BLD: 10.2 K/UL (ref 1.8–8)
NEUTS SEG # BLD: 11.6 K/UL (ref 1.8–8)
NEUTS SEG NFR BLD: 87 % (ref 32–75)
NEUTS SEG NFR BLD: 91 % (ref 32–75)
NITRITE UR QL STRIP.AUTO: POSITIVE
NRBC # BLD: 0 K/UL (ref 0–0.01)
NRBC # BLD: 0 K/UL (ref 0–0.01)
NRBC BLD-RTO: 0 PER 100 WBC
NRBC BLD-RTO: 0 PER 100 WBC
P-R INTERVAL, ECG05: 136 MS
PH UR STRIP: 5.5 [PH] (ref 5–8)
PLATELET # BLD AUTO: 117 K/UL (ref 150–400)
PLATELET # BLD AUTO: 133 K/UL (ref 150–400)
PMV BLD AUTO: 13.8 FL (ref 8.9–12.9)
POTASSIUM SERPL-SCNC: 4.2 MMOL/L (ref 3.5–5.1)
PROT SERPL-MCNC: 6.6 G/DL (ref 6.4–8.2)
PROT UR STRIP-MCNC: 30 MG/DL
PROTHROMBIN TIME: 18.3 SEC (ref 9–11.1)
Q-T INTERVAL, ECG07: 350 MS
QRS DURATION, ECG06: 76 MS
QTC CALCULATION (BEZET), ECG08: 437 MS
RBC # BLD AUTO: 2.31 M/UL (ref 3.8–5.2)
RBC # BLD AUTO: 4.26 M/UL (ref 3.8–5.2)
RBC #/AREA URNS HPF: ABNORMAL /HPF (ref 0–5)
RBC MORPH BLD: ABNORMAL
RBC MORPH BLD: ABNORMAL
SERVICE CMNT-IMP: ABNORMAL
SERVICE CMNT-IMP: NORMAL
SODIUM SERPL-SCNC: 136 MMOL/L (ref 136–145)
SP GR UR REFRACTOMETRY: 1.02 (ref 1–1.03)
SPECIMEN EXP DATE BLD: NORMAL
STATUS OF UNIT,%ST: NORMAL
STATUS OF UNIT,%ST: NORMAL
TROPONIN I SERPL-MCNC: <0.04 NG/ML
UNIT DIVISION, %UDIV: 0
UNIT DIVISION, %UDIV: 0
UROBILINOGEN UR QL STRIP.AUTO: 1 EU/DL (ref 0.2–1)
VENTRICULAR RATE, ECG03: 94 BPM
WBC # BLD AUTO: 11.7 K/UL (ref 3.6–11)
WBC # BLD AUTO: 12.7 K/UL (ref 3.6–11)
WBC URNS QL MICRO: ABNORMAL /HPF (ref 0–4)

## 2018-01-01 PROCEDURE — 73100 X-RAY EXAM OF WRIST: CPT

## 2018-01-01 PROCEDURE — 83605 ASSAY OF LACTIC ACID: CPT

## 2018-01-01 PROCEDURE — 81001 URINALYSIS AUTO W/SCOPE: CPT | Performed by: EMERGENCY MEDICINE

## 2018-01-01 PROCEDURE — 82272 OCCULT BLD FECES 1-3 TESTS: CPT | Performed by: INTERNAL MEDICINE

## 2018-01-01 PROCEDURE — 65270000029 HC RM PRIVATE

## 2018-01-01 PROCEDURE — 74011250636 HC RX REV CODE- 250/636: Performed by: INTERNAL MEDICINE

## 2018-01-01 PROCEDURE — 86901 BLOOD TYPING SEROLOGIC RH(D): CPT

## 2018-01-01 PROCEDURE — G0299 HHS/HOSPICE OF RN EA 15 MIN: HCPCS

## 2018-01-01 PROCEDURE — 77010033678 HC OXYGEN DAILY

## 2018-01-01 PROCEDURE — 65270000015 HC RM PRIVATE ONCOLOGY

## 2018-01-01 PROCEDURE — P9016 RBC LEUKOCYTES REDUCED: HCPCS

## 2018-01-01 PROCEDURE — 87186 SC STD MICRODIL/AGAR DIL: CPT | Performed by: INTERNAL MEDICINE

## 2018-01-01 PROCEDURE — 0651 HSPC ROUTINE HOME CARE

## 2018-01-01 PROCEDURE — 36430 TRANSFUSION BLD/BLD COMPNT: CPT

## 2018-01-01 PROCEDURE — 71045 X-RAY EXAM CHEST 1 VIEW: CPT

## 2018-01-01 PROCEDURE — 85025 COMPLETE CBC W/AUTO DIFF WBC: CPT | Performed by: EMERGENCY MEDICINE

## 2018-01-01 PROCEDURE — 80053 COMPREHEN METABOLIC PANEL: CPT | Performed by: EMERGENCY MEDICINE

## 2018-01-01 PROCEDURE — 86920 COMPATIBILITY TEST SPIN: CPT

## 2018-01-01 PROCEDURE — 82962 GLUCOSE BLOOD TEST: CPT

## 2018-01-01 PROCEDURE — 84484 ASSAY OF TROPONIN QUANT: CPT | Performed by: EMERGENCY MEDICINE

## 2018-01-01 PROCEDURE — 87077 CULTURE AEROBIC IDENTIFY: CPT | Performed by: INTERNAL MEDICINE

## 2018-01-01 PROCEDURE — 87040 BLOOD CULTURE FOR BACTERIA: CPT | Performed by: EMERGENCY MEDICINE

## 2018-01-01 PROCEDURE — 74011636637 HC RX REV CODE- 636/637: Performed by: INTERNAL MEDICINE

## 2018-01-01 PROCEDURE — 74011000250 HC RX REV CODE- 250: Performed by: INTERNAL MEDICINE

## 2018-01-01 PROCEDURE — 70450 CT HEAD/BRAIN W/O DYE: CPT

## 2018-01-01 PROCEDURE — 36415 COLL VENOUS BLD VENIPUNCTURE: CPT | Performed by: INTERNAL MEDICINE

## 2018-01-01 PROCEDURE — 74011250637 HC RX REV CODE- 250/637: Performed by: INTERNAL MEDICINE

## 2018-01-01 PROCEDURE — 99284 EMERGENCY DEPT VISIT MOD MDM: CPT

## 2018-01-01 PROCEDURE — 85610 PROTHROMBIN TIME: CPT | Performed by: INTERNAL MEDICINE

## 2018-01-01 PROCEDURE — 76705 ECHO EXAM OF ABDOMEN: CPT

## 2018-01-01 PROCEDURE — 73110 X-RAY EXAM OF WRIST: CPT

## 2018-01-01 PROCEDURE — 87186 SC STD MICRODIL/AGAR DIL: CPT | Performed by: EMERGENCY MEDICINE

## 2018-01-01 PROCEDURE — 83605 ASSAY OF LACTIC ACID: CPT | Performed by: EMERGENCY MEDICINE

## 2018-01-01 PROCEDURE — 87077 CULTURE AEROBIC IDENTIFY: CPT | Performed by: EMERGENCY MEDICINE

## 2018-01-01 PROCEDURE — 93005 ELECTROCARDIOGRAM TRACING: CPT

## 2018-01-01 PROCEDURE — 87086 URINE CULTURE/COLONY COUNT: CPT | Performed by: INTERNAL MEDICINE

## 2018-01-01 PROCEDURE — 36415 COLL VENOUS BLD VENIPUNCTURE: CPT

## 2018-01-01 PROCEDURE — 74011250636 HC RX REV CODE- 250/636: Performed by: EMERGENCY MEDICINE

## 2018-01-01 PROCEDURE — 70360 X-RAY EXAM OF NECK: CPT

## 2018-01-01 PROCEDURE — 3336500001 HSPC ELECTION

## 2018-01-01 RX ORDER — INSULIN GLARGINE 100 [IU]/ML
20 INJECTION, SOLUTION SUBCUTANEOUS
Status: DISCONTINUED | OUTPATIENT
Start: 2018-01-01 | End: 2018-01-01

## 2018-01-01 RX ORDER — MAGNESIUM SULFATE 100 %
4 CRYSTALS MISCELLANEOUS AS NEEDED
Status: DISCONTINUED | OUTPATIENT
Start: 2018-01-01 | End: 2018-01-01 | Stop reason: HOSPADM

## 2018-01-01 RX ORDER — MORPHINE SULFATE 4 MG/ML
2 INJECTION, SOLUTION INTRAMUSCULAR; INTRAVENOUS
Status: DISCONTINUED | OUTPATIENT
Start: 2018-01-01 | End: 2018-01-01

## 2018-01-01 RX ORDER — KETOROLAC TROMETHAMINE 30 MG/ML
30 INJECTION, SOLUTION INTRAMUSCULAR; INTRAVENOUS
Status: DISCONTINUED | OUTPATIENT
Start: 2018-01-01 | End: 2018-01-01 | Stop reason: HOSPADM

## 2018-01-01 RX ORDER — MORPHINE SULFATE 2 MG/ML
2 INJECTION, SOLUTION INTRAMUSCULAR; INTRAVENOUS EVERY 4 HOURS
Status: DISCONTINUED | OUTPATIENT
Start: 2018-01-01 | End: 2018-01-01

## 2018-01-01 RX ORDER — MORPHINE SULFATE 2 MG/ML
1 INJECTION, SOLUTION INTRAMUSCULAR; INTRAVENOUS
Status: DISCONTINUED | OUTPATIENT
Start: 2018-01-01 | End: 2018-01-01

## 2018-01-01 RX ORDER — MORPHINE SULFATE 2 MG/ML
2 INJECTION, SOLUTION INTRAMUSCULAR; INTRAVENOUS
Status: DISCONTINUED | OUTPATIENT
Start: 2018-01-01 | End: 2018-01-01 | Stop reason: SDUPTHER

## 2018-01-01 RX ORDER — ONDANSETRON 2 MG/ML
4 INJECTION INTRAMUSCULAR; INTRAVENOUS
Status: DISCONTINUED | OUTPATIENT
Start: 2018-01-01 | End: 2018-01-01 | Stop reason: HOSPADM

## 2018-01-01 RX ORDER — INSULIN LISPRO 100 [IU]/ML
INJECTION, SOLUTION INTRAVENOUS; SUBCUTANEOUS
Status: DISCONTINUED | OUTPATIENT
Start: 2018-01-01 | End: 2018-01-01

## 2018-01-01 RX ORDER — ACETAMINOPHEN 650 MG/1
650 SUPPOSITORY RECTAL
Status: DISCONTINUED | OUTPATIENT
Start: 2018-01-01 | End: 2018-01-01 | Stop reason: HOSPADM

## 2018-01-01 RX ORDER — SODIUM CHLORIDE 0.9 % (FLUSH) 0.9 %
5-10 SYRINGE (ML) INJECTION EVERY 8 HOURS
Status: DISCONTINUED | OUTPATIENT
Start: 2018-01-01 | End: 2018-01-01 | Stop reason: HOSPADM

## 2018-01-01 RX ORDER — MORPHINE SULFATE 4 MG/ML
2 INJECTION, SOLUTION INTRAMUSCULAR; INTRAVENOUS EVERY 4 HOURS
Status: DISCONTINUED | OUTPATIENT
Start: 2018-01-01 | End: 2018-01-01 | Stop reason: CLARIF

## 2018-01-01 RX ORDER — LORAZEPAM 2 MG/ML
1 INJECTION INTRAMUSCULAR EVERY 4 HOURS
Status: DISCONTINUED | OUTPATIENT
Start: 2018-01-01 | End: 2018-01-01

## 2018-01-01 RX ORDER — LOPERAMIDE HYDROCHLORIDE 2 MG/1
2 CAPSULE ORAL
Status: DISCONTINUED | OUTPATIENT
Start: 2018-01-01 | End: 2018-01-01 | Stop reason: HOSPADM

## 2018-01-01 RX ORDER — GLYCOPYRROLATE 0.2 MG/ML
0.2 INJECTION INTRAMUSCULAR; INTRAVENOUS
Status: DISCONTINUED | OUTPATIENT
Start: 2018-01-01 | End: 2018-01-01 | Stop reason: HOSPADM

## 2018-01-01 RX ORDER — SODIUM CHLORIDE 0.9 % (FLUSH) 0.9 %
5 SYRINGE (ML) INJECTION AS NEEDED
Status: DISCONTINUED | OUTPATIENT
Start: 2018-01-01 | End: 2018-01-01 | Stop reason: HOSPADM

## 2018-01-01 RX ORDER — SODIUM CHLORIDE 9 MG/ML
250 INJECTION, SOLUTION INTRAVENOUS AS NEEDED
Status: DISCONTINUED | OUTPATIENT
Start: 2018-01-01 | End: 2018-01-01 | Stop reason: HOSPADM

## 2018-01-01 RX ORDER — MORPHINE SULFATE 10 MG/ML
1 INJECTION, SOLUTION INTRAMUSCULAR; INTRAVENOUS EVERY 4 HOURS
Status: DISCONTINUED | OUTPATIENT
Start: 2018-01-01 | End: 2018-01-01 | Stop reason: HOSPADM

## 2018-01-01 RX ORDER — DEXTROSE 50 % IN WATER (D50W) INTRAVENOUS SYRINGE
12.5-25 AS NEEDED
Status: DISCONTINUED | OUTPATIENT
Start: 2018-01-01 | End: 2018-01-01 | Stop reason: HOSPADM

## 2018-01-01 RX ORDER — ACETAMINOPHEN 325 MG/1
650 TABLET ORAL
Status: DISCONTINUED | OUTPATIENT
Start: 2018-01-01 | End: 2018-01-01

## 2018-01-01 RX ORDER — SODIUM CHLORIDE 9 MG/ML
100 INJECTION, SOLUTION INTRAVENOUS CONTINUOUS
Status: DISCONTINUED | OUTPATIENT
Start: 2018-01-01 | End: 2018-01-01 | Stop reason: ALTCHOICE

## 2018-01-01 RX ORDER — HYDRALAZINE HYDROCHLORIDE 20 MG/ML
10 INJECTION INTRAMUSCULAR; INTRAVENOUS
Status: DISCONTINUED | OUTPATIENT
Start: 2018-01-01 | End: 2018-01-01 | Stop reason: HOSPADM

## 2018-01-01 RX ORDER — LORAZEPAM 2 MG/ML
0.5 INJECTION INTRAMUSCULAR
Status: DISCONTINUED | OUTPATIENT
Start: 2018-01-01 | End: 2018-01-01

## 2018-01-01 RX ORDER — FACIAL-BODY WIPES
10 EACH TOPICAL DAILY PRN
Status: DISCONTINUED | OUTPATIENT
Start: 2018-01-01 | End: 2018-01-01 | Stop reason: HOSPADM

## 2018-01-01 RX ORDER — SODIUM CHLORIDE 9 MG/ML
75 INJECTION, SOLUTION INTRAVENOUS CONTINUOUS
Status: DISCONTINUED | OUTPATIENT
Start: 2018-01-01 | End: 2018-01-01

## 2018-01-01 RX ORDER — SODIUM CHLORIDE 0.9 % (FLUSH) 0.9 %
5-10 SYRINGE (ML) INJECTION AS NEEDED
Status: DISCONTINUED | OUTPATIENT
Start: 2018-01-01 | End: 2018-01-01 | Stop reason: HOSPADM

## 2018-01-01 RX ORDER — KETOROLAC TROMETHAMINE 30 MG/ML
15 INJECTION, SOLUTION INTRAMUSCULAR; INTRAVENOUS
Status: ACTIVE | OUTPATIENT
Start: 2018-01-01 | End: 2018-01-01

## 2018-01-01 RX ORDER — PEN NEEDLE, DIABETIC 31 GX5/16"
NEEDLE, DISPOSABLE MISCELLANEOUS
Qty: 100 PEN NEEDLE | Refills: 0 | Status: SHIPPED | OUTPATIENT
Start: 2018-01-01

## 2018-01-01 RX ORDER — LORAZEPAM 2 MG/ML
1 INJECTION INTRAMUSCULAR
Status: DISCONTINUED | OUTPATIENT
Start: 2018-01-01 | End: 2018-01-01 | Stop reason: HOSPADM

## 2018-01-01 RX ORDER — SODIUM CHLORIDE 9 MG/ML
250 INJECTION, SOLUTION INTRAVENOUS AS NEEDED
Status: DISCONTINUED | OUTPATIENT
Start: 2018-01-01 | End: 2018-01-01

## 2018-01-01 RX ORDER — DABIGATRAN ETEXILATE 150 MG/1
150 CAPSULE ORAL EVERY 12 HOURS
Status: DISCONTINUED | OUTPATIENT
Start: 2018-01-01 | End: 2018-01-01

## 2018-01-01 RX ORDER — BENAZEPRIL HYDROCHLORIDE 10 MG/1
40 TABLET ORAL DAILY
Status: DISCONTINUED | OUTPATIENT
Start: 2018-01-01 | End: 2018-01-01

## 2018-01-01 RX ORDER — MORPHINE SULFATE 4 MG/ML
1 INJECTION, SOLUTION INTRAMUSCULAR; INTRAVENOUS EVERY 4 HOURS
Status: DISCONTINUED | OUTPATIENT
Start: 2018-01-01 | End: 2018-01-01 | Stop reason: SDUPTHER

## 2018-01-01 RX ORDER — AMLODIPINE BESYLATE 5 MG/1
5 TABLET ORAL DAILY
Status: DISCONTINUED | OUTPATIENT
Start: 2018-01-01 | End: 2018-01-01

## 2018-01-01 RX ORDER — MORPHINE SULFATE 2 MG/ML
2 INJECTION, SOLUTION INTRAMUSCULAR; INTRAVENOUS
Status: DISCONTINUED | OUTPATIENT
Start: 2018-01-01 | End: 2018-01-01 | Stop reason: HOSPADM

## 2018-01-01 RX ORDER — ENOXAPARIN SODIUM 100 MG/ML
40 INJECTION SUBCUTANEOUS EVERY 24 HOURS
Status: DISCONTINUED | OUTPATIENT
Start: 2018-01-01 | End: 2018-01-01

## 2018-01-01 RX ORDER — SODIUM CHLORIDE 9 MG/ML
125 INJECTION, SOLUTION INTRAVENOUS CONTINUOUS
Status: DISCONTINUED | OUTPATIENT
Start: 2018-01-01 | End: 2018-01-01

## 2018-01-01 RX ORDER — CALCIUM CARBONATE 200(500)MG
200 TABLET,CHEWABLE ORAL
Status: DISCONTINUED | OUTPATIENT
Start: 2018-01-01 | End: 2018-01-01 | Stop reason: HOSPADM

## 2018-01-01 RX ORDER — MORPHINE SULFATE 4 MG/ML
2 INJECTION INTRAVENOUS
Status: DISCONTINUED | OUTPATIENT
Start: 2018-01-01 | End: 2018-01-01 | Stop reason: HOSPADM

## 2018-01-01 RX ORDER — SCOLOPAMINE TRANSDERMAL SYSTEM 1 MG/1
1 PATCH, EXTENDED RELEASE TRANSDERMAL
Status: DISCONTINUED | OUTPATIENT
Start: 2018-01-01 | End: 2018-01-01 | Stop reason: HOSPADM

## 2018-01-01 RX ORDER — MIRTAZAPINE 15 MG/1
7.5 TABLET, FILM COATED ORAL
Status: DISCONTINUED | OUTPATIENT
Start: 2018-01-01 | End: 2018-01-01 | Stop reason: ALTCHOICE

## 2018-01-01 RX ORDER — ASPIRIN 81 MG/1
81 TABLET ORAL DAILY
Status: DISCONTINUED | OUTPATIENT
Start: 2018-01-01 | End: 2018-01-01

## 2018-01-01 RX ORDER — LORAZEPAM 2 MG/ML
0.5 INJECTION INTRAMUSCULAR EVERY 4 HOURS
Status: DISCONTINUED | OUTPATIENT
Start: 2018-01-01 | End: 2018-01-01 | Stop reason: HOSPADM

## 2018-01-01 RX ORDER — PRAVASTATIN SODIUM 10 MG/1
10 TABLET ORAL
Status: DISCONTINUED | OUTPATIENT
Start: 2018-01-01 | End: 2018-01-01

## 2018-01-01 RX ADMIN — MORPHINE SULFATE 1 MG: 2 INJECTION, SOLUTION INTRAMUSCULAR; INTRAVENOUS at 11:39

## 2018-01-01 RX ADMIN — MORPHINE SULFATE 1 MG: 10 INJECTION, SOLUTION INTRAVENOUS at 19:40

## 2018-01-01 RX ADMIN — LORAZEPAM 0.5 MG: 2 INJECTION INTRAMUSCULAR; INTRAVENOUS at 12:52

## 2018-01-01 RX ADMIN — MORPHINE SULFATE 1 MG: 10 INJECTION, SOLUTION INTRAVENOUS at 15:20

## 2018-01-01 RX ADMIN — INSULIN GLARGINE 20 UNITS: 100 INJECTION, SOLUTION SUBCUTANEOUS at 21:34

## 2018-01-01 RX ADMIN — MORPHINE SULFATE 1 MG: 2 INJECTION, SOLUTION INTRAMUSCULAR; INTRAVENOUS at 17:10

## 2018-01-01 RX ADMIN — Medication 5 ML: at 10:15

## 2018-01-01 RX ADMIN — MORPHINE SULFATE 2 MG: 4 INJECTION, SOLUTION INTRAMUSCULAR; INTRAVENOUS at 18:57

## 2018-01-01 RX ADMIN — LORAZEPAM 1 MG: 2 INJECTION INTRAMUSCULAR; INTRAVENOUS at 10:08

## 2018-01-01 RX ADMIN — INSULIN LISPRO 4 UNITS: 100 INJECTION, SOLUTION INTRAVENOUS; SUBCUTANEOUS at 22:59

## 2018-01-01 RX ADMIN — LORAZEPAM 0.5 MG: 2 INJECTION INTRAMUSCULAR; INTRAVENOUS at 00:39

## 2018-01-01 RX ADMIN — MORPHINE SULFATE 2 MG: 2 INJECTION, SOLUTION INTRAMUSCULAR; INTRAVENOUS at 20:10

## 2018-01-01 RX ADMIN — MORPHINE SULFATE 1 MG: 4 INJECTION, SOLUTION INTRAMUSCULAR; INTRAVENOUS at 20:26

## 2018-01-01 RX ADMIN — MORPHINE SULFATE 1 MG: 4 INJECTION, SOLUTION INTRAMUSCULAR; INTRAVENOUS at 12:52

## 2018-01-01 RX ADMIN — MORPHINE SULFATE 2 MG: 4 INJECTION, SOLUTION INTRAMUSCULAR; INTRAVENOUS at 15:58

## 2018-01-01 RX ADMIN — LORAZEPAM 0.5 MG: 2 INJECTION INTRAMUSCULAR; INTRAVENOUS at 04:46

## 2018-01-01 RX ADMIN — LORAZEPAM 0.5 MG: 2 INJECTION INTRAMUSCULAR; INTRAVENOUS at 00:05

## 2018-01-01 RX ADMIN — MORPHINE SULFATE 2 MG: 2 INJECTION, SOLUTION INTRAMUSCULAR; INTRAVENOUS at 23:11

## 2018-01-01 RX ADMIN — MORPHINE SULFATE 1 MG: 10 INJECTION, SOLUTION INTRAVENOUS at 11:39

## 2018-01-01 RX ADMIN — PIPERACILLIN SODIUM AND TAZOBACTAM SODIUM 3.38 G: .375; 3 INJECTION, POWDER, LYOPHILIZED, FOR SOLUTION INTRAVENOUS at 13:59

## 2018-01-01 RX ADMIN — SODIUM CHLORIDE 500 ML: 900 INJECTION, SOLUTION INTRAVENOUS at 12:10

## 2018-01-01 RX ADMIN — MORPHINE SULFATE 1 MG: 4 INJECTION, SOLUTION INTRAMUSCULAR; INTRAVENOUS at 18:15

## 2018-01-01 RX ADMIN — MORPHINE SULFATE 1 MG: 10 INJECTION, SOLUTION INTRAVENOUS at 00:28

## 2018-01-01 RX ADMIN — LORAZEPAM 0.5 MG: 2 INJECTION INTRAMUSCULAR; INTRAVENOUS at 11:39

## 2018-01-01 RX ADMIN — SODIUM CHLORIDE 1578 ML: 900 INJECTION, SOLUTION INTRAVENOUS at 10:11

## 2018-01-01 RX ADMIN — Medication 10 ML: at 21:34

## 2018-01-01 RX ADMIN — Medication 5 ML: at 14:00

## 2018-01-01 RX ADMIN — ONDANSETRON HYDROCHLORIDE 4 MG: 2 INJECTION, SOLUTION INTRAMUSCULAR; INTRAVENOUS at 21:33

## 2018-01-01 RX ADMIN — LORAZEPAM 0.5 MG: 2 INJECTION INTRAMUSCULAR; INTRAVENOUS at 15:59

## 2018-01-01 RX ADMIN — MORPHINE SULFATE 2 MG: 2 INJECTION, SOLUTION INTRAMUSCULAR; INTRAVENOUS at 10:12

## 2018-01-01 RX ADMIN — CEFTRIAXONE SODIUM 1 G: 1 INJECTION, POWDER, FOR SOLUTION INTRAMUSCULAR; INTRAVENOUS at 11:46

## 2018-01-01 RX ADMIN — MORPHINE SULFATE 1 MG: 4 INJECTION, SOLUTION INTRAMUSCULAR; INTRAVENOUS at 20:44

## 2018-01-01 RX ADMIN — GLYCOPYRROLATE 0.2 MG: 0.2 INJECTION, SOLUTION INTRAMUSCULAR; INTRAVENOUS at 00:27

## 2018-01-01 RX ADMIN — LORAZEPAM 0.5 MG: 2 INJECTION INTRAMUSCULAR; INTRAVENOUS at 16:27

## 2018-01-01 RX ADMIN — DABIGATRAN ETEXILATE MESYLATE 150 MG: 150 CAPSULE ORAL at 16:53

## 2018-01-01 RX ADMIN — SODIUM CHLORIDE 100 ML/HR: 900 INJECTION, SOLUTION INTRAVENOUS at 13:52

## 2018-01-01 RX ADMIN — MORPHINE SULFATE 1 MG: 4 INJECTION, SOLUTION INTRAMUSCULAR; INTRAVENOUS at 04:46

## 2018-01-01 RX ADMIN — MORPHINE SULFATE 1 MG: 4 INJECTION, SOLUTION INTRAMUSCULAR; INTRAVENOUS at 00:25

## 2018-01-01 RX ADMIN — LORAZEPAM 1 MG: 2 INJECTION INTRAMUSCULAR; INTRAVENOUS at 07:50

## 2018-01-01 RX ADMIN — ONDANSETRON HYDROCHLORIDE 4 MG: 2 INJECTION, SOLUTION INTRAMUSCULAR; INTRAVENOUS at 15:12

## 2018-01-01 RX ADMIN — Medication 5 ML: at 15:20

## 2018-01-01 RX ADMIN — LORAZEPAM 0.5 MG: 2 INJECTION INTRAMUSCULAR; INTRAVENOUS at 19:40

## 2018-01-01 RX ADMIN — MORPHINE SULFATE 1 MG: 4 INJECTION, SOLUTION INTRAMUSCULAR; INTRAVENOUS at 16:43

## 2018-01-01 RX ADMIN — LORAZEPAM 0.5 MG: 2 INJECTION INTRAMUSCULAR; INTRAVENOUS at 20:26

## 2018-01-01 RX ADMIN — MORPHINE SULFATE 1 MG: 4 INJECTION, SOLUTION INTRAMUSCULAR; INTRAVENOUS at 08:44

## 2018-01-01 RX ADMIN — Medication 5 ML: at 08:08

## 2018-01-01 RX ADMIN — MORPHINE SULFATE 1 MG: 4 INJECTION, SOLUTION INTRAMUSCULAR; INTRAVENOUS at 04:20

## 2018-01-01 RX ADMIN — SODIUM CHLORIDE 500 ML: 900 INJECTION, SOLUTION INTRAVENOUS at 23:02

## 2018-01-01 RX ADMIN — PIPERACILLIN SODIUM AND TAZOBACTAM SODIUM 3.38 G: .375; 3 INJECTION, POWDER, LYOPHILIZED, FOR SOLUTION INTRAVENOUS at 21:34

## 2018-01-01 RX ADMIN — KETOROLAC TROMETHAMINE 30 MG: 30 INJECTION, SOLUTION INTRAMUSCULAR at 11:01

## 2018-01-01 RX ADMIN — SODIUM CHLORIDE 75 ML/HR: 900 INJECTION, SOLUTION INTRAVENOUS at 00:51

## 2018-01-01 RX ADMIN — MORPHINE SULFATE 2 MG: 4 INJECTION, SOLUTION INTRAMUSCULAR; INTRAVENOUS at 10:37

## 2018-01-01 RX ADMIN — LORAZEPAM 0.5 MG: 2 INJECTION INTRAMUSCULAR; INTRAVENOUS at 08:28

## 2018-01-01 RX ADMIN — ACETAMINOPHEN 650 MG: 325 TABLET ORAL at 17:42

## 2018-01-01 RX ADMIN — LORAZEPAM 0.5 MG: 2 INJECTION INTRAMUSCULAR; INTRAVENOUS at 16:43

## 2018-01-01 RX ADMIN — LORAZEPAM 0.5 MG: 2 INJECTION INTRAMUSCULAR; INTRAVENOUS at 00:27

## 2018-01-01 RX ADMIN — MORPHINE SULFATE 1 MG: 4 INJECTION, SOLUTION INTRAMUSCULAR; INTRAVENOUS at 00:39

## 2018-01-01 RX ADMIN — MORPHINE SULFATE 2 MG: 4 INJECTION, SOLUTION INTRAMUSCULAR; INTRAVENOUS at 16:14

## 2018-01-01 RX ADMIN — GLYCOPYRROLATE 0.2 MG: 0.2 INJECTION, SOLUTION INTRAMUSCULAR; INTRAVENOUS at 19:42

## 2018-01-01 RX ADMIN — MORPHINE SULFATE 2 MG: 2 INJECTION, SOLUTION INTRAMUSCULAR; INTRAVENOUS at 08:07

## 2018-01-01 RX ADMIN — LORAZEPAM 0.5 MG: 2 INJECTION INTRAMUSCULAR; INTRAVENOUS at 00:25

## 2018-01-01 RX ADMIN — GLYCOPYRROLATE 0.2 MG: 0.2 INJECTION, SOLUTION INTRAMUSCULAR; INTRAVENOUS at 09:39

## 2018-01-01 RX ADMIN — PIPERACILLIN SODIUM AND TAZOBACTAM SODIUM 3.38 G: .375; 3 INJECTION, POWDER, LYOPHILIZED, FOR SOLUTION INTRAVENOUS at 05:27

## 2018-01-01 RX ADMIN — LORAZEPAM 0.5 MG: 2 INJECTION INTRAMUSCULAR; INTRAVENOUS at 20:44

## 2018-01-01 RX ADMIN — Medication 10 ML: at 05:03

## 2018-01-01 RX ADMIN — INSULIN LISPRO 2 UNITS: 100 INJECTION, SOLUTION INTRAVENOUS; SUBCUTANEOUS at 17:02

## 2018-01-01 RX ADMIN — LORAZEPAM 1 MG: 2 INJECTION INTRAMUSCULAR; INTRAVENOUS at 02:55

## 2018-01-01 RX ADMIN — LORAZEPAM 0.5 MG: 2 INJECTION INTRAMUSCULAR; INTRAVENOUS at 04:20

## 2018-01-01 RX ADMIN — Medication 10 ML: at 15:16

## 2018-01-01 RX ADMIN — MORPHINE SULFATE 2 MG: 4 INJECTION, SOLUTION INTRAMUSCULAR; INTRAVENOUS at 11:26

## 2018-02-04 PROBLEM — A41.9 SEPSIS (HCC): Status: ACTIVE | Noted: 2018-01-01

## 2018-02-04 NOTE — H&P
Hospitalist Admission Note    NAME: Merline Gaw   :  3/16/1930   MRN:  [id number]     Date/Time:  2018 1:00 PM    Patient PCP: Wisam Yanes MD  ________________________________________________________________________    My assessment of this patient's clinical condition and my plan of care is as follows. Assessment / Plan:  Sepsis due to UTI  -CXR neg, head CT neg  -s/p IVF bolus in the ER  -cont' maintenance IVF  -repeat lactate, f/u urine cx(order as add on)  -empiric zosyn for UTI and possible intra-abd pathology    Dysphagia  -XR neck soft tissue neg for swelling or hypopharyngeal airway distention demonstrated  -dysphagia diet  -SLP consulted    Elevated LFT/INR with abd discomfort  Thrombocytopenia  -check abd US  -on empiric abx  -hold statin    T2DM  -A1c 6.9, SSI, lantus at lower dose, titrate prn    Hx of CVA with residual L side weakness  -cont' home pradaxa, ASA  -PT/OT    CAD s/p CABG, chronic diastolic CHF, paroxysmal afib and benign HTN  -cont' home meds  -monitor volume status while on IVF  -hydralazine prn    Dementia with depression  -cont' mirtazapine     Code Status: DN, P 10  Surrogate Decision Maker: daughter  DVT Prophylaxis: on pradexa, INR 1.8  GI Prophylaxis:  none  Baseline: wheelchair dependent, from the [facility]        Subjective:   CHIEF COMPLAINT: vomiting, dysuria    HISTORY OF PRESENT ILLNESS:     Mandi Milan is a 80 y.o.  female w pmhx significant for CVA with residual L side paresis,  HTN, depression, insomnia, CHF, CAD, hypercholesterolemia, hypokalemia, CVA, arthritis, DM, a-fib, heart murmur, macular degeneration, skin CA, chronic UTI, present to ED c/o vomiting and difficulty swallong started this past Friday. Pt as seen at [facility] on Friday and was given abx to UTI, however had not yet started taking it yet. Dysuria symptoms persisted so pt was taken to the ER by her daughter for further evaluation.   At baseline, pt is wheelchair dependent. Lives at the [facility] and is completely dependent on staffs/family members for her ALDs. In the ER, vitals; T 98.1, P 106, /72, SpO2 100% on RA  Pertinent labs: lactate 2.3, WBC 12  We were asked to admit for work up and evaluation of the above problems.      Past Medical History:   Diagnosis Date    A-fib [facility])     per patient and daughter   Edna Ocasio [facility])     Arthritis     Bladder infection, chronic     hx    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    third-way heart bypass    CAD (coronary artery disease)     Chronic UTI     Congestive heart failure, unspecified     Depression     Hypercholesterolemia     Hypertension     Hypokalemia     Insomnia     Macular degeneration     Moderate mitral regurgitation     Murmur     NIDDM     daily medicatons for diabetes    Skin cancer     Stroke ([facility] [address] [phone],    4 strokes and 1 TIA    Sun-damaged skin     Sunburn, blistering     Unspecified vitamin D deficiency         Past Surgical History:   Procedure Laterality Date    HX CORONARY ARTERY BYPASS GRAFT  12/9/09    3v    HX CORONARY ARTERY BYPASS GRAFT      3 vessel    HX HAMMER TOE REPAIR      HX HYSTERECTOMY      at age 29    HX MOHS PROCEDURES Left 01/16/2017    SCC left medial calf by Dr. Scarlett Dawn      bladder repair    IMPLANT  LOOP RECORDER  7/15/2016         ORAL SURGERY PROCEDURE      implants       Social History   Substance Use Topics    Smoking status: Never Smoker    Smokeless tobacco: Never Used    Alcohol use 0.0 oz/week     0 Standard drinks or equivalent per week      Comment: once a year        Family History   Problem Relation Age of Onset    Diabetes Daughter     Heart Disease Daughter      stent placed    Stroke Daughter     Stroke Sister     Diabetes Mother      Allergies   Allergen Reactions    Aleve [Naproxen Sodium] Other (comments)     Caused stomach ulcer and thrush    Eliquis [Apixaban] Other (comments)     dizziness    Labetalol Other (comments)     dizzyness    Metformin Diarrhea     With both IR and ER     Plavix [Clopidogrel] Other (comments)     Pt. Said it was not effective        Prior to Admission medications    Medication Sig Start Date End Date Taking? Authorizing Provider   BD INSULIN PEN NEEDLE UF MINI 31 gauge x 3/16\" ndle USE TO INJECT LANTUS EVERY DAY 1/18/18  Yes Tiarra Menjivar NP   amLODIPine-benazepril (LOTREL) 5-40 mg per capsule TAKE 1 CAPSULE BY MOUTH DAILY 11/13/17  Yes Kp Holly MD   mirtazapine (REMERON) 7.5 mg tablet TK 1 T PO Q NIGHT 10/9/17  Yes Historical Provider   BD INSULIN PEN NEEDLE UF MINI 31 gauge x 3/16\" ndle USE TO INJECT LANTUS EVERY DAY 8/10/17  Yes Historical Provider   levoFLOXacin (LEVAQUIN) 250 mg tablet  10/5/17  Yes Historical Provider   insulin glargine (LANTUS SOLOSTAR) 100 unit/mL (3 mL) inpn 25 Units by SubCUTAneous route daily. 10/16/17  Yes Milly Monsalve MD   amLODIPine-benazepril (LOTREL) 5-40 mg per capsule Take 1 Cap by mouth nightly. Yes Historical Provider   LOPERAMIDE HCL (IMODIUM PO) Take 2 Tabs by mouth daily as needed for Diarrhea. Yes Historical Provider   traZODone (DESYREL) 50 mg tablet TAKE 1/2 TABLET BY MOUTH EVERY NIGHT 7/26/17  Yes Tiarra Menjivar NP   PHENAZOPYRIDINE HCL (PYRIDIUM PO) Take 1 Tab by mouth daily as needed (UTI symptoms). Yes Historical Provider   dabigatran etexilate (PRADAXA) 150 mg capsule TAKE ONE CAPSULE BY MOUTH EVERY 12 HOURS FOR CEREBROVASCULAR ACCIDENT 6/30/17  Yes Kp Holly MD   pravastatin (PRAVACHOL) 10 mg tablet TAKE 1 TABLET BY MOUTH NIGHTLY. DISCONTINUE LIPITOR. 12/1/16  Yes Kp Holly MD   acetaminophen (TYLENOL ARTHRITIS PAIN) 650 mg CR tablet Take 1 Tab by mouth two (2) times daily as needed for Pain. 10/24/16  Yes Kp Holly MD   calcium carbonate (TUMS) 200 mg calcium (500 mg) chew Take 1 Tab by mouth two (2) times daily as needed.  10/24/16  Yes Kp Holly MD       REVIEW OF SYSTEMS:     I am not able to complete the review of systems because: The patient is intubated and sedated    The patient has altered mental status due to his acute medical problems    The patient has baseline aphasia from prior stroke(s)    The patient has baseline dementia and is not reliable historian    The patient is in acute medical distress and unable to provide information           Total of 12 systems reviewed as follows:       POSITIVE= BOLDtext  Negative = text not BOLD  General:  fever, chills, sweats, generalized weakness, weight loss/gain,      loss of appetite   Eyes:    blurred vision, eye pain, loss of vision, double vision  ENT:    rhinorrhea, pharyngitis   Respiratory:   cough, sputum production, SOB, SANDOVAL, wheezing, pleuritic pain   Cardiology:   chest pain, palpitations, orthopnea, PND, edema, syncope   Gastrointestinal:  abdominal pain , N/V, diarrhea, dysphagia, constipation, bleeding   Genitourinary:  frequency, urgency, dysuria, hematuria, incontinence   Muskuloskeletal :  arthralgia, myalgia, back pain  Hematology:  easy bruising, nose or gum bleeding, lymphadenopathy   Dermatological: rash, ulceration, pruritis, color change / jaundice  Endocrine:   hot flashes or polydipsia   Neurological:  headache, dizziness, confusion, focal weakness, paresthesia,     Speech difficulties, memory loss, gait difficulty  Psychological: Feelings of anxiety, depression, agitation    Objective:   VITALS:    Visit Vitals    /67    Pulse 84    Temp 98.1 °F (36.7 °C)    Resp 20    Wt 52.6 kg (116 lb)    SpO2 100%    BMI 22.65 kg/m2       PHYSICAL EXAM:    General:    Alert, cooperative, no distress, appears stated age. HEENT: Atraumatic, anicteric sclerae, pink conjunctivae     No oral ulcers, mucosa dry, throat clear, dentition fair  Neck:  Supple, symmetrical,  thyroid: non tender  Lungs:   Clear to auscultation bilaterally. No Wheezing or Rhonchi. No rales. Chest wall:  No tenderness  No Accessory muscle use.   Heart: Regular  rhythm,  No  murmur   No edema  Abdomen:   Soft, non-tender. Not distended. Bowel sounds normal  Extremities: No cyanosis. No clubbing,      Skin turgor normal, Capillary refill normal, Radial dial pulse 2+  Skin:     Not pale. Not Jaundiced  No rashes   Psych:  Not depressed. Not anxious or agitated. Neurologic: L arm contracted. No aphasia or slurred speech. Symmetrical strength, Sensation grossly intact. Alert and oriented X3    _______________________________________________________________________  Care Plan discussed with:    Comments   Patient x    Family  x    RN x    Care Manager                    Consultant:      _______________________________________________________________________  Expected  Disposition:   Home with Family    HH/PT/OT/RN    SNF/LTC x   MARK    ________________________________________________________________________  TOTAL TIME:  72 Minutes    Critical Care Provided     Minutes non procedure based      Comments    x Reviewed previous records   >50% of visit spent in counseling and coordination of care x Discussion with patient and/or family and questions answered       ________________________________________________________________________  Signed: Deirdre Whitfield MD    Procedures: see electronic medical records for all procedures/Xrays and details which were not copied into this note but were reviewed prior to creation of Plan.     LAB DATA REVIEWED:    Recent Results (from the past 24 hour(s))   EKG, 12 LEAD, INITIAL    Collection Time: 02/04/18 10:00 AM   Result Value Ref Range    Ventricular Rate 94 BPM    Atrial Rate 94 BPM    P-R Interval 136 ms    QRS Duration 76 ms    Q-T Interval 350 ms    QTC Calculation (Bezet) 437 ms    Calculated P Axis 63 degrees    Calculated R Axis 61 degrees    Calculated T Axis 26 degrees    Diagnosis       Sinus rhythm with premature supraventricular complexes and with frequent   premature ventricular complexes  Septal infarct , age undetermined  When compared with ECG of 11-SEP-2016 14:08,  premature ventricular complexes are now present  premature supraventricular complexes are now present  Septal infarct is now present     LACTIC ACID    Collection Time: 02/04/18 10:02 AM   Result Value Ref Range    Lactic acid 2.3 (HH) 0.4 - 2.0 MMOL/L   METABOLIC PANEL, COMPREHENSIVE    Collection Time: 02/04/18 10:02 AM   Result Value Ref Range    Sodium 136 136 - 145 mmol/L    Potassium 4.2 3.5 - 5.1 mmol/L    Chloride 103 97 - 108 mmol/L    CO2 25 21 - 32 mmol/L    Anion gap 8 5 - 15 mmol/L    Glucose 197 (H) 65 - 100 mg/dL    BUN 15 6 - 20 MG/DL    Creatinine 0.78 0.55 - 1.02 MG/DL    BUN/Creatinine ratio 19 12 - 20      GFR est AA >60 >60 ml/min/1.73m2    GFR est non-AA >60 >60 ml/min/1.73m2    Calcium 8.9 8.5 - 10.1 MG/DL    Bilirubin, total 4.2 (H) 0.2 - 1.0 MG/DL    ALT (SGPT) 162 (H) 12 - 78 U/L    AST (SGOT) 114 (H) 15 - 37 U/L    Alk. phosphatase 184 (H) 45 - 117 U/L    Protein, total 6.6 6.4 - 8.2 g/dL    Albumin 3.2 (L) 3.5 - 5.0 g/dL    Globulin 3.4 2.0 - 4.0 g/dL    A-G Ratio 0.9 (L) 1.1 - 2.2     CBC WITH AUTOMATED DIFF    Collection Time: 02/04/18 10:02 AM   Result Value Ref Range    WBC 12.7 (H) 3.6 - 11.0 K/uL    RBC 4.26 3.80 - 5.20 M/uL    HGB 13.2 11.5 - 16.0 g/dL    HCT 39.0 35.0 - 47.0 %    MCV 91.5 80.0 - 99.0 FL    MCH 31.0 26.0 - 34.0 PG    MCHC 33.8 30.0 - 36.5 g/dL    RDW 14.4 11.5 - 14.5 %    PLATELET 206 (L) 177 - 400 K/uL    MPV 13.8 (H) 8.9 - 12.9 FL    NRBC 0.0 0  WBC    ABSOLUTE NRBC 0.00 0.00 - 0.01 K/uL    NEUTROPHILS 91 (H) 32 - 75 %    LYMPHOCYTES 3 (L) 12 - 49 %    MONOCYTES 5 5 - 13 %    EOSINOPHILS 0 0 - 7 %    BASOPHILS 0 0 - 1 %    IMMATURE GRANULOCYTES 1 (H) 0.0 - 0.5 %    ABS. NEUTROPHILS 11.6 (H) 1.8 - 8.0 K/UL    ABS. LYMPHOCYTES 0.4 (L) 0.8 - 3.5 K/UL    ABS. MONOCYTES 0.6 0.0 - 1.0 K/UL    ABS. EOSINOPHILS 0.0 0.0 - 0.4 K/UL    ABS. BASOPHILS 0.0 0.0 - 0.1 K/UL    ABS. IMM.  GRANS. 0.1 (H) 0.00 - 0.04 K/UL    DF AUTOMATED      RBC COMMENTS NORMOCYTIC, NORMOCHROMIC     TROPONIN I    Collection Time: 02/04/18 10:02 AM   Result Value Ref Range    Troponin-I, Qt. <0.04 <0.05 ng/mL   URINALYSIS W/ RFLX MICROSCOPIC    Collection Time: 02/04/18 10:16 AM   Result Value Ref Range    Color DARK YELLOW      Appearance CLOUDY (A) CLEAR      Specific gravity 1.018 1.003 - 1.030      pH (UA) 5.5 5.0 - 8.0      Protein 30 (A) NEG mg/dL    Glucose NEGATIVE  NEG mg/dL    Ketone TRACE (A) NEG mg/dL    Blood TRACE (A) NEG      Urobilinogen 1.0 0.2 - 1.0 EU/dL    Nitrites POSITIVE (A) NEG      Leukocyte Esterase LARGE (A) NEG      WBC 20-50 0 - 4 /hpf    RBC 0-5 0 - 5 /hpf    Epithelial cells FEW FEW /lpf    Bacteria 4+ (A) NEG /hpf   BILIRUBIN, CONFIRM    Collection Time: 02/04/18 10:16 AM   Result Value Ref Range    Bilirubin UA, confirm POSITIVE (A) NEG     POC LACTIC ACID    Collection Time: 02/04/18 11:52 AM   Result Value Ref Range    Lactic Acid (POC) 3.5 (HH) 0.4 - 2.0 mmol/L

## 2018-02-04 NOTE — ED PROVIDER NOTES
EMERGENCY DEPARTMENT HISTORY AND PHYSICAL EXAM      Date: 2/4/2018  Patient Name: Scott Rincon    History of Presenting Illness     Chief Complaint   Patient presents with   Tiburcio Fu     Unable to swallow food since Thursday       History Provided By: Patient and Patient's Daughter    HPI: Scott Rincon, 80 y.o. female with PMHx significant for HTN, depression, insomnia, CHF, CAD, hypercholesterolemia, hypokalemia, CVA, arthritis, DM, a-fib, heart murmur, macular degeneration, skin CA, chronic UTI who presents in wheelchair to the ED with cc of gradually worsening dysphagia x 4 days. Daughter reports associated vomiting secondary to trying to swallow. Pt and daughter deny use of medication to modify her symptoms. Daughter reports a hx of similar symptoms secondary to esophagitis secondary to NSAID use. Daughter states that the pt has LLE weakness and LUE contractures from prior CVA. Daughter notes that the pt's last BM occurred yesterday. Daughter states that the [facility] and had a WBC of 17. Daughter notes that the pt c/o dysuria at night. Daughter states that the pt was rx'd antibiotics but has not had a chance to fill them yet. Pt and daughter deny any abdominal pain, diarrhea, CP, SOB, fevers, chills, numbness, weakness that changed from baseline, or HA. - tobacco use, + EtOH use, - Illicit drug use    PCP: Eric Medina MD    There are no other complaints, changes, or physical findings at this time.     Current Facility-Administered Medications   Medication Dose Route Frequency Provider Last Rate Last Dose    sodium chloride (NS) flush 5-10 mL  5-10 mL IntraVENous PRN Jaylen Dolan MD        sodium chloride (NS) flush 5-10 mL  5-10 mL IntraVENous Q8H Dora Fisher MD        sodium chloride (NS) flush 5-10 mL  5-10 mL IntraVENous PRN Dora Fisher MD        acetaminophen (TYLENOL) tablet 650 mg  650 mg Oral Q4H PRN Dora Fisher MD        [facility] injection 4 mg  4 mg IntraVENous Q4H PRN Katie Alford MD        bisacodyl (DULCOLAX) suppository 10 mg  10 mg Rectal DAILY PRN Katie Alford MD        [START ON 2/5/2018] amLODIPine (NORVASC) tablet 5 mg  5 mg Oral DAILY Katie Alford MD        calcium carbonate (TUMS) chewable tablet 200 mg [elemental]  200 mg Oral BID PRN Katie Alford MD        dabigatran etexilate (PRADAXA) capsule 150 mg  150 mg Oral Q12H Katie Alford MD        insulin glargine (LANTUS) injection 20 Units  20 Units SubCUTAneous QHS Katie Alford MD        loperamide (IMODIUM) capsule 2 mg  2 mg Oral Q4H PRN Katie Alford MD        mirtazapine (REMERON) tablet 7.5 mg  7.5 mg Oral QHS Katie Alford MD        hydrALAZINE (APRESOLINE) 20 mg/mL injection 10 mg  10 mg IntraVENous Q6H PRN Katie Alford MD        insulin lispro (HUMALOG) injection   SubCUTAneous AC&HS Katie Alford MD        glucose chewable tablet 16 g  4 Tab Oral PRN Katie Alford MD        dextrose (D50W) injection syrg 12.5-25 g  12.5-25 g IntraVENous PRN Katie Alford MD        glucagon (GLUCAGEN) injection 1 mg  1 mg IntraMUSCular PRN Katie Alford MD        0.9% sodium chloride infusion  100 mL/hr IntraVENous CONTINUOUS Bekah MÉNDEZ  mL/hr at 02/04/18 1352 100 mL/hr at 02/04/18 1352    [START ON 2/5/2018] benazepril (LOTENSIN) tablet 40 mg  40 mg Oral DAILY Kirstin Harman MD        piperacillin-tazobactam (ZOSYN) 3.375 g in  ml premix/cmpd  3.375 g IntraVENous NOW Kirstin Harman MD        piperacillin-tazobactam (ZOSYN) 3.375 g in  ml premix/cmpd  3.375 g IntraVENous Q8H Katie Alford MD         Current Outpatient Prescriptions   Medication Sig Dispense Refill    BD INSULIN PEN NEEDLE UF MINI 31 gauge x 3/16\" ndle USE TO INJECT LANTUS EVERY  Pen Needle 0    amLODIPine-benazepril (LOTREL) 5-40 mg per capsule TAKE 1 CAPSULE BY MOUTH DAILY 90 Cap 0    mirtazapine (REMERON) 7.5 mg tablet TK 1 T PO Q NIGHT  4    BD INSULIN PEN NEEDLE UF MINI 31 gauge x 3/16\" ndle USE TO INJECT LANTUS EVERY DAY  0    levoFLOXacin (LEVAQUIN) 250 mg tablet   0    insulin glargine (LANTUS SOLOSTAR) 100 unit/mL (3 mL) inpn 25 Units by SubCUTAneous route daily. 15 mL 0    amLODIPine-benazepril (LOTREL) 5-40 mg per capsule Take 1 Cap by mouth nightly.  LOPERAMIDE HCL (IMODIUM PO) Take 2 Tabs by mouth daily as needed for Diarrhea.  traZODone (DESYREL) 50 mg tablet TAKE 1/2 TABLET BY MOUTH EVERY NIGHT 30 Tab 0    PHENAZOPYRIDINE HCL (PYRIDIUM PO) Take 1 Tab by mouth daily as needed (UTI symptoms).  dabigatran etexilate (PRADAXA) 150 mg capsule TAKE ONE CAPSULE BY MOUTH EVERY 12 HOURS FOR CEREBROVASCULAR ACCIDENT 60 Cap 3    pravastatin (PRAVACHOL) 10 mg tablet TAKE 1 TABLET BY MOUTH NIGHTLY. DISCONTINUE LIPITOR. 90 Tab 5    acetaminophen (TYLENOL ARTHRITIS PAIN) 650 mg CR tablet Take 1 Tab by mouth two (2) times daily as needed for Pain. 30 Tab 5    calcium carbonate (TUMS) 200 mg calcium (500 mg) chew Take 1 Tab by mouth two (2) times daily as needed.  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    third-way heart bypass    CAD (coronary artery disease)     Chronic UTI     Congestive heart failure, unspecified     Depression     Hypercholesterolemia     Hypertension     Hypokalemia     Insomnia     Macular degeneration     Moderate mitral regurgitation     Murmur     NIDDM     daily medicatons for diabetes    Skin cancer     Stroke ([address] [id number],    4 strokes and 1 TIA    Sun-damaged skin     Sunburn, blistering     Unspecified vitamin D deficiency        Past Surgical History:  Past Surgical History:   Procedure Laterality Date    HX CORONARY ARTERY BYPASS GRAFT  12/9/09    3v    HX CORONARY ARTERY BYPASS GRAFT      3 vessel    HX HAMMER TOE REPAIR      HX HYSTERECTOMY      at age 29    HX MOHS PROCEDURES Left 01/16/2017    SCC left medial calf by Dr. Reyna Blankenship      bladder repair    IMPLANT  LOOP RECORDER  7/15/2016         ORAL SURGERY PROCEDURE      implants       Family History:  Family History   Problem Relation Age of Onset    Diabetes Daughter     Heart Disease Daughter      stent placed    Stroke Daughter     Stroke Sister     Diabetes Mother        Social History:  Social History   Substance Use Topics    Smoking status: Never Smoker    Smokeless tobacco: Never Used    Alcohol use 0.0 oz/week     0 Standard drinks or equivalent per week      Comment: once a year       Allergies: Allergies   Allergen Reactions    Aleve [Naproxen Sodium] Other (comments)     Caused stomach ulcer and thrush    Eliquis [Apixaban] Other (comments)     dizziness    Labetalol Other (comments)     dizzyness    Metformin Diarrhea     With both IR and ER     Plavix [Clopidogrel] Other (comments)     Pt. Phong Fu it was not effective         Review of Systems   Review of Systems   Constitutional: Negative for chills and fever. HENT: Positive for trouble swallowing. Respiratory: Negative for shortness of breath. Cardiovascular: Negative for chest pain. Gastrointestinal: Positive for nausea and vomiting. Negative for abdominal pain and diarrhea. Genitourinary: Positive for dysuria. Neurological: Negative for weakness, numbness and headaches. All other systems reviewed and are negative. Physical Exam   Physical Exam     Vital signs and nursing notes reviewed    CONSTITUTIONAL: Alert, in no apparent distress; well-developed; well-nourished. HEAD:  Normocephalic, atraumatic  EYES: PERRL; EOM's intact. ENTM: Nose: no rhinorrhea; Throat: no erythema or exudate, mucous membranes dry  Neck:  Supple. trachea is midline. RESP: Chest clear, equal breath sounds. - W/R/R  CV: S1 and S2 WNL; No murmurs, gallops or rubs.  2+ radial and DP pulses bilaterally. GI: non-distended, normal bowel sounds, abdomen soft and non-tender. No masses or organomegaly. : No costo-vertebral angle tenderness. BACK:  Non-tender, normal appearance  UPPER EXT:  Normal inspection. no joint or soft tissue swelling. Left arm contracted. Left wrist tenderness with no overlying redness or induration. LOWER EXT: No edema, no calf tenderness. NEURO: Alert and oriented x3, 5/5 strength and light touch sensation intact in bilateral upper and lower extremities. No facial droop  SKIN: No rashes;  Warm and dry  PSYCH: Normal mood, normal affect    Diagnostic Study Results     Labs -     Recent Results (from the past 12 hour(s))   EKG, 12 LEAD, INITIAL    Collection Time: 02/04/18 10:00 AM   Result Value Ref Range    Ventricular Rate 94 BPM    Atrial Rate 94 BPM    P-R Interval 136 ms    QRS Duration 76 ms    Q-T Interval 350 ms    QTC Calculation (Bezet) 437 ms    Calculated P Axis 63 degrees    Calculated R Axis 61 degrees    Calculated T Axis 26 degrees    Diagnosis       Sinus rhythm with premature supraventricular complexes and with frequent   premature ventricular complexes  Septal infarct , age undetermined  When compared with ECG of 11-SEP-2016 14:08,  premature ventricular complexes are now present  premature supraventricular complexes are now present  Septal infarct is now present     LACTIC ACID    Collection Time: 02/04/18 10:02 AM   Result Value Ref Range    Lactic acid 2.3 (HH) 0.4 - 2.0 MMOL/L   METABOLIC PANEL, COMPREHENSIVE    Collection Time: 02/04/18 10:02 AM   Result Value Ref Range    Sodium 136 136 - 145 mmol/L    Potassium 4.2 3.5 - 5.1 mmol/L    Chloride 103 97 - 108 mmol/L    CO2 25 21 - 32 mmol/L    Anion gap 8 5 - 15 mmol/L    Glucose 197 (H) 65 - 100 mg/dL    BUN 15 6 - 20 MG/DL    Creatinine 0.78 0.55 - 1.02 MG/DL    BUN/Creatinine ratio 19 12 - 20      GFR est AA >60 >60 ml/min/1.73m2    GFR est non-AA >60 >60 ml/min/1.73m2    Calcium 8.9 8.5 - 10.1 MG/DL Bilirubin, total 4.2 (H) 0.2 - 1.0 MG/DL    ALT (SGPT) 162 (H) 12 - 78 U/L    AST (SGOT) 114 (H) 15 - 37 U/L    Alk. phosphatase 184 (H) 45 - 117 U/L    Protein, total 6.6 6.4 - 8.2 g/dL    Albumin 3.2 (L) 3.5 - 5.0 g/dL    Globulin 3.4 2.0 - 4.0 g/dL    A-G Ratio 0.9 (L) 1.1 - 2.2     CBC WITH AUTOMATED DIFF    Collection Time: 02/04/18 10:02 AM   Result Value Ref Range    WBC 12.7 (H) 3.6 - 11.0 K/uL    RBC 4.26 3.80 - 5.20 M/uL    HGB 13.2 11.5 - 16.0 g/dL    HCT 39.0 35.0 - 47.0 %    MCV 91.5 80.0 - 99.0 FL    MCH 31.0 26.0 - 34.0 PG    MCHC 33.8 30.0 - 36.5 g/dL    RDW 14.4 11.5 - 14.5 %    PLATELET 718 (L) 107 - 400 K/uL    MPV 13.8 (H) 8.9 - 12.9 FL    NRBC 0.0 0  WBC    ABSOLUTE NRBC 0.00 0.00 - 0.01 K/uL    NEUTROPHILS 91 (H) 32 - 75 %    LYMPHOCYTES 3 (L) 12 - 49 %    MONOCYTES 5 5 - 13 %    EOSINOPHILS 0 0 - 7 %    BASOPHILS 0 0 - 1 %    IMMATURE GRANULOCYTES 1 (H) 0.0 - 0.5 %    ABS. NEUTROPHILS 11.6 (H) 1.8 - 8.0 K/UL    ABS. LYMPHOCYTES 0.4 (L) 0.8 - 3.5 K/UL    ABS. MONOCYTES 0.6 0.0 - 1.0 K/UL    ABS. EOSINOPHILS 0.0 0.0 - 0.4 K/UL    ABS. BASOPHILS 0.0 0.0 - 0.1 K/UL    ABS. IMM.  GRANS. 0.1 (H) 0.00 - 0.04 K/UL    DF AUTOMATED      RBC COMMENTS NORMOCYTIC, NORMOCHROMIC     TROPONIN I    Collection Time: 02/04/18 10:02 AM   Result Value Ref Range    Troponin-I, Qt. <0.04 <0.05 ng/mL   URINALYSIS W/ RFLX MICROSCOPIC    Collection Time: 02/04/18 10:16 AM   Result Value Ref Range    Color DARK YELLOW      Appearance CLOUDY (A) CLEAR      Specific gravity 1.018 1.003 - 1.030      pH (UA) 5.5 5.0 - 8.0      Protein 30 (A) NEG mg/dL    Glucose NEGATIVE  NEG mg/dL    Ketone TRACE (A) NEG mg/dL    Blood TRACE (A) NEG      Urobilinogen 1.0 0.2 - 1.0 EU/dL    Nitrites POSITIVE (A) NEG      Leukocyte Esterase LARGE (A) NEG      WBC 20-50 0 - 4 /hpf    RBC 0-5 0 - 5 /hpf    Epithelial cells FEW FEW /lpf    Bacteria 4+ (A) NEG /hpf   BILIRUBIN, CONFIRM    Collection Time: 02/04/18 10:16 AM   Result Value Ref Range    Bilirubin UA, confirm POSITIVE (A) NEG     POC LACTIC ACID    Collection Time: 02/04/18 11:52 AM   Result Value Ref Range    Lactic Acid (POC) 3.5 (HH) 0.4 - 2.0 mmol/L       Radiologic Studies -   XR WRIST LT AP/LAT   Final Result      XR NECK SOFT TISSUE   Final Result      CT HEAD WO CONT   Final Result      XR CHEST PORT   Final Result      US ABD LTD    (Results Pending)     CT Results  (Last 48 hours)               02/04/18 1110  CT HEAD WO CONT Final result    Impression:  IMPRESSION: Atrophy and chronic infarctions again demonstrated. No acute   infarction or acute intracranial hemorrhage demonstrated. Narrative:  EXAM:  CT HEAD WO CONT       INDICATION:   Neuro deficit, acute single, stable or partly resolved; difficulty   with swallowing; eval for new/subacute CVA       COMPARISON: CT October 14, 2017. CONTRAST:  None. TECHNIQUE: Unenhanced CT of the head was performed using 5 mm images. Brain and   bone windows were generated. CT dose reduction was achieved through use of a   standardized protocol tailored for this examination and automatic exposure   control for dose modulation. FINDINGS:   There is unchanged mild to moderate ventricular and cortical sulcal prominence   with ex vacuo enlargement of the posterior body and atrium-occipital horn of the   right lateral ventricle. Chronic right frontoparietal and posterior temporal   infarction is again demonstrated. Chronic infarction of the anterior right   external capsule is again demonstrated. Bilateral periventricular diminished   attenuation consistent with chronic small vessel ischemic disease the white   matter is again shown. Lacunar area of diminished attenuation in the   anterior-superior right efrain is unchanged, consistent with remote infarct. .   There is no intracranial hemorrhage, extra-axial collection, mass, mass effect   or midline shift. The basilar cisterns are open.  No acute infarct is   identified. The bone windows demonstrate no abnormalities. The visualized   portions of the paranasal sinuses and mastoid air cells are clear. CXR Results  (Last 48 hours)               02/04/18 1036  XR CHEST PORT Final result    Impression:  IMPRESSION: No acute findings. Narrative:  EXAM:  XR CHEST PORT       INDICATION:  Sepsis       COMPARISON:  9/11/2016       FINDINGS: A portable AP radiograph of the chest was obtained at 1025 hours. There is no pneumothorax or pleural effusion. The lungs are clear. Median   sternotomy wires are again shown. Cardiac size remains normal. Minimal-mild   atherosclerotic thoracic aortic tortuosity is unchanged, with otherwise normal   mediastinal and hilar contour. The bones are severely osteopenic. Posttraumatic   deformity of the right humeral head is again shown. Medical Decision Making   I am the first provider for this patient. I reviewed the vital signs, available nursing notes, past medical history, past surgical history, family history and social history. Vital Signs-Reviewed the patient's vital signs. Patient Vitals for the past 12 hrs:   Temp Pulse Resp BP SpO2   02/04/18 1230 - 84 20 160/67 100 %   02/04/18 1200 - 88 27 159/76 99 %   02/04/18 1130 - 85 28 158/64 96 %   02/04/18 0940 98.1 °F (36.7 °C) (!) 106 18 184/72 100 %       Pulse Oximetry Analysis - 100% on RA    Cardiac Monitor:   Rate: 106 bpm  Rhythm: Normal Sinus Rhythm      EKG interpretation: (Preliminary) 1000  Rhythm: normal sinus rhythm; and regular . Rate (approx.): 94; Axis: normal; HI interval: normal; QRS interval: normal ; ST/T wave: no acute ischemic changes; Other findings: Occasional PVCs. Written by TERRELL Tinajero, as dictated by Mihai Bustamante MD.    Records Reviewed: Nursing Notes and Old Medical Records    Provider Notes (Medical Decision Making):      80 y.o. female presenting with dysphagia as primary compliant.  Found to have severe sepsis with UTI as likely source. Tbili is elevated no focal abdominal pain, gallbladder US ordered. No obstructions seen on CXR and neck XR for dysphagia, still consider central cause as small stroke. Plan for admission received early goal directed therapy including IV fluids and antibiotics. ED Course:   Initial assessment performed. The patients presenting problems have been discussed, and they are in agreement with the care plan formulated and outlined with them. I have encouraged them to ask questions as they arise throughout their visit. CONSULT NOTE:   11:23 AM  Jil Bojorquez MD spoke with Wing Heimlich, MD,   Specialty: Hospitalist  Discussed pt's hx, disposition, and available diagnostic and imaging results. Reviewed care plans. Consultant will evaluate pt for admission. Written by Antonia Bailey ED Scribe, as dictated by Jil Bojorquez MD.    PROGRESS NOTE:  11:27 AM  Pt has been re-evaluated. Pt and family updated on plan of care. Written by Antonia Bailey ED scribe, as dictated by Jil Bojorquez MD    11:32 AM - I suspect that this patient has an active infection. 11:32 AM - The patient met criteria for severe sepsis at this time.       PROVIDER SEPSIS PHYSICAL EXAM EVAL  Vital signs reviewed (see nursing documentation for further details):  Vitals:    02/04/18 0940   BP: 184/72   Pulse: (!) 106   Resp: 18   Temp: 98.1 °F (36.7 °C)   SpO2: 100%       Cardiac exam:Regular Rate    Pulmonary exam:Normal    Peripheral pulses:Normal    Capillary refill:Normal    Skin exam:pink    Exam performed Jacqui Fuller MD    Critical Care Time:     CRITICAL CARE NOTE :    IMPENDING DETERIORATION -Cardiovascular and Metabolic    ASSOCIATED RISK FACTORS - Hypotension, Shock and Metabolic changes    MANAGEMENT- Bedside Assessment and Supervision of Care    INTERPRETATION -  Xrays, ECG, Blood Pressure and Cardiac Output Measures     INTERVENTIONS - hemodynamic mngmt and antibiotic management    CASE REVIEW - Hospitalist, Nursing and Family    TREATMENT RESPONSE -Improved    PERFORMED BY - Self        NOTES   :      I have spent 50 minutes of critical care time involved in lab review, consultations with specialist, family decision- making, bedside attention and documentation. During this entire length of time I was immediately available to the patient . Brooks Buchanan MD    PROGRESS NOTE:  1:54 PM  Pt has been re-evaluated. Pt bed ready upstairs. No US yet. No abdominal pain on initial exam. On re-exam reveals RUQ pain, discussed with hospitalist with expansion of antibiotics to cover for intraabdominal infection beyond UTI. Written by Capri Shepherd, ED scribe, as dictated by Brooks Buchanan MD    Disposition:    ADMIT NOTE:  11:02 AM  Patient is being admitted to the hospital.  The results of their tests and reasons for their admission have been discussed with them and/or available family. They convey agreement and understanding for the need to be admitted and for their admission diagnosis. Consultation has been made with the inpatient physician specialist for hospitalization. PLAN:  1. Admit to hospitalist    Diagnosis     Clinical Impression:   1. Sepsis, due to unspecified organism ([address]    2. Acute cystitis with hematuria    3. Oropharyngeal dysphagia        Attestations: This note is prepared by Capri Shepherd, acting as Scribe for Brooks Buchanan MD.    Brooks Buchanan MD: The scribe's documentation has been prepared under my direction and personally reviewed by me in its entirety. I confirm that the note above accurately reflects all work, treatment, procedures, and medical decision making performed by me.

## 2018-02-04 NOTE — ED NOTES
Verbal report given to Jacques(name) on Precilla Mon being transferred to [facility](unit) for ordered procedure    Report consisted of patient's Situation, Background, Assessment and Recommendations (SBAR)    Information from the following report(s)  SBAR, Kardex and Intake/Output was reviewed with the receiving nurse. Opportunity for questions and clarification was provided. Patient transported with:      Last Filed Values:  Temp: 98.1 °F (36.7 °C) (02/04/18 0940)  Pulse (Heart Rate): 84 (02/04/18 1230)  Resp Rate: 20 (02/04/18 1230)  O2 Sat (%): 100 % (02/04/18 1230)  BP: 160/67 (02/04/18 1230)  MAP (Monitor): 94 (02/04/18 1230)  MAP (Calculated): 109 (02/04/18 0940)  Level of Consciousness: Alert (02/04/18 0940)      WBC   Date Value Ref Range Status   02/04/2018 12.7 (H) 3.6 - 11.0 K/uL Final     Comment:     Due to mathematical rounding between the [address], and the new "[employer] Hematology analyzers, the reported automated differential may vary by up to +/- 0.5% per cell line. This finding may produce a result that is 100% +/- 3%, which is clinically insignificant. 10/16/2017 8.9 3.6 - 11.0 K/uL Final   10/14/2017 11.0 3.6 - 11.0 K/uL Final       Blood Cultures Drawn:  yes    Initial Lactic Acid (LA):  Time 1000,  Result 2.3    Repeat LA:  Time Due 1200, Done & Result 3.5 another repeat will be due at 1400 reported to [facility]    Fluid Resuscitation:  Total needed 2000 mL, Status infusing    All Antibiotics Started:  yes, Dose Due finished    VS x 2 post-fluid resuscitation:   yes    Vasopressor Infusion:  no     Provider Reassessment needed and notified:  yes ,     Additional Interventions/Comments:  Reported lactic has gone up and we will need to do a repeat at 1400. Ultrasound is not ready at this time for the patient. They will send for patient once they are on the floor.

## 2018-02-04 NOTE — PROGRESS NOTES
TRANSFER - IN REPORT:    Verbal report received from Wilfredo Cha RN(name) on Alex Reeves  being received from ER(unit) for routine progression of care      Report consisted of patients Situation, Background, Assessment and   Recommendations(SBAR). Information from the following report(s) SBAR, Kardex, ED Summary, Intake/Output, MAR and Cardiac Rhythm NSR PACS was reviewed with the receiving nurse. Opportunity for questions and clarification was provided. Assessment completed upon patients arrival to unit and care assumed.

## 2018-02-04 NOTE — ED NOTES
Called and updated Jass Cheese that the lactic acid was drawn and sent and zosyn had been started. Ultrasound not available to take the patient at this time.

## 2018-02-05 PROBLEM — K92.2 GI BLEED: Status: ACTIVE | Noted: 2018-01-01

## 2018-02-05 NOTE — PROGRESS NOTES
Verbal shift change report given to [facility] (oncoming nurse) by Sharon Ambrose RN (offgoing nurse). Report included the following information SBAR, Kardex, Intake/Output, MAR and Recent Results.

## 2018-02-05 NOTE — PROGRESS NOTES
Initial Nutrition Assessment:    INTERVENTIONS/RECOMMENDATIONS:   · Continue regular diet    ASSESSMENT:   Chart reviewed, medically noted for new dysphagia and PMH shown below. Nutrition referral triggered due to MST score. Noted for hospice meeting today. Past documented weights show no significant wt loss PTA. Will wait to see outcomes of hospice meeting. Past Medical History:   Diagnosis Date    A-fib [facility])     per patient and daughter   Haley Arroyo [facility])     Arthritis     Bladder infection, chronic     hx    Blockage of Heart Bypass Graft 2009    third-way heart bypass    CAD (coronary artery disease)     Chronic UTI     Congestive heart failure, unspecified     Depression     Hypercholesterolemia     Hypertension     Hypokalemia     Insomnia     Macular degeneration     Moderate mitral regurgitation     Murmur     NIDDM     daily medicatons for diabetes    Skin cancer     Stroke ([facility] [address] 2443,6701,    4 strokes and 1 TIA    Sun-damaged skin     Sunburn, blistering     Unspecified vitamin D deficiency        Diet Order: Regular  % Eaten:  No data found.     Pertinent Medications: [x]Reviewed:   Pertinent Labs: [x]Reviewed: B, elevated LFT;s and T. Bili   Food Allergies: [x]NKFA  []Other   Last BM:   Edema:        []RUE   []LUE   []RLE   []LLE      Pressure Injury:      [] Stage I   [] Stage II   [] Stage III   [] Stage IV      Wt Readings from Last 30 Encounters:   18 52.6 kg (116 lb)   10/24/17 52.6 kg (116 lb)   10/15/17 55.2 kg (121 lb 11.2 oz)   10/15/17 56.6 kg (124 lb 12.5 oz)   10/05/17 52.6 kg (116 lb)   17 56 kg (123 lb 7.3 oz)   17 54.9 kg (121 lb)   17 54.4 kg (120 lb)   17 56.7 kg (125 lb)   17 56.7 kg (125 lb)   17 56.7 kg (125 lb)   17 60.8 kg (134 lb)   16 60.8 kg (134 lb)   16 60.8 kg (134 lb)   16 59.9 kg (132 lb)   11/10/16 59.9 kg (132 lb)   10/24/16 59.9 kg (132 lb)   10/21/16 60.8 kg (134 lb) 10/11/16 61.2 kg (135 lb)   09/22/16 56.7 kg (125 lb)   09/11/16 57 kg (125 lb 10.6 oz)   08/06/16 60.9 kg (134 lb 5 oz)   08/04/16 62.8 kg (138 lb 7.2 oz)   07/29/16 61.7 kg (136 lb)   07/15/16 62.6 kg (138 lb)   07/14/16 63 kg (138 lb 14.4 oz)   06/15/16 62.4 kg (137 lb 9.6 oz)   06/09/16 64.7 kg (142 lb 9.6 oz)   05/11/16 68 kg (150 lb)   05/11/16 64.6 kg (142 lb 6.7 oz)       Anthropometrics:   Height:   Weight: 52.6 kg (116 lb)   IBW (%IBW):   ( ) UBW (%UBW):   (  %)   Last Weight Metrics:  Weight Loss Metrics 2/4/2018 10/24/2017 10/15/2017 10/5/2017 9/29/2017 7/5/2017 5/30/2017   Today's Wt 116 lb 116 lb 121 lb 11.2 oz 116 lb 123 lb 7.3 oz 121 lb 120 lb   BMI 22.65 kg/m2 22.65 kg/m2 23.77 kg/m2 22.65 kg/m2 24.11 kg/m2 23.63 kg/m2 23.44 kg/m2       BMI: Body mass index is 22.65 kg/(m^2). This BMI is indicative of:   []Underweight    [x]Normal    []Overweight    [] Obesity   [] Extreme Obesity (BMI>40)     Estimated Nutrition Needs (Based on):   1150 Kcals/day (BMR: 900 x 1.3) , 55 g (1 g/kg) Protein  Carbohydrate: At Least 130 g/day  Fluids: 1150 mL/day (1ml/kcal) or per primary team    NUTRITION DIAGNOSES:   Problem:  Swallowing difficulty      Etiology: related to unknow etiology     Signs/Symptoms: as evidenced by H&P documentation      NUTRITION INTERVENTIONS:  Meals/Snacks: General/healthful diet                  GOAL:   consume >50% of meals in 2-4 days    LEARNING NEEDS (Diet, Food/Nutrient-Drug Interaction):    [x] None Identified   [] Identified and Education Provided/Documented   [] Identified and Pt declined/was not appropriate     Cultureal, Restorationist, OR Ethnic Dietary Needs:    [x] None Identified   [] Identified and Addressed     [x] Interdisciplinary Care Plan Reviewed/Documented    [x] Discharge Planning:  General healthy diet     MONITORING /EVALUATION:      Food/Nutrient Intake Outcomes:  Total energy intake  Physical Signs/Symptoms Outcomes: Weight/weight change, Electrolyte and renal profile, GI    NUTRITION RISK:    [x] High      to        [x] Moderate           []  Low  []  Minimal/Uncompromised    PT SEEN FOR:    []  MD Consult: []Calorie Count      []Diabetic Diet Education        []Diet Education     []Electrolyte Management     []General Nutrition Management and Supplements     []Management of Tube Feeding     []TPN Recommendations    [x]  RN Referral:  [x]MST score >=2     []Enteral/Parenteral Nutrition PTA     []Pregnant: Gestational DM or Multigestation     []Pressure Ulcer/Wound Care needs        []  Low BMI  []  LOS Referral       Karon Vaughan RDN  Pager [phone]  Weekend Pager [phone]

## 2018-02-05 NOTE — PROGRESS NOTES
Patient has received one unit of blood. Family wants to speak with physicians about hospice and comfort care. They asked me to wait on giving her the second unit until that time. Also they refused her medications this morning. She has great difficulty swallowing.

## 2018-02-05 NOTE — HOSPICE
190 Jen Blum RN consultation visit note. Order for hospice noted. History and events of this hospitalization reviewed. TC to daughter Cande Zepeda at [phone]   Support given and plans are to meet at 1700 today in patient's room.     Thank you for asking us to be a part of the care for this lady and her family    Please call [phone] if questions or concerns    Khloe Garcia RN [facility]

## 2018-02-05 NOTE — PROGRESS NOTES
Hospitalist Progress Note    NAME: Luz Baldwin   :  3/16/1930   MRN:  [id number]       Assessment / Plan:  Acute blood loss anemia  Sepsis due to UTI, treated with zosyn, now discontinued  Dysphagia    Elevated LFT/INR with abd discomfort  Thrombocytopenia  T2DM  Hx of CVA with residual L sided weakness/debility   Dementia with depression  CAD s/p CABG, chronic diastolic CHF, paroxysmal afib and benign HTN  -in the setting of asa/pradaxa use for secondary CVA prevention, now discontinued/  -discussed with family at bedside regarding further work up and resuscitation. however family and pt politely declined further work up/treatment. Pt received 1 of 2 units prbc ordered. Family wished for no further treatment/resuscitation. Discussed risk of no further treatment could mean sudden death. Family wish to make patient comfort care. Hospice nurse consulted. Medications adjusted to reflect comfort care  Pt being evaluated by hospice team for possible inpt comfort care. If she does not meet criteria for inpt hospice, then will proceed with comfort care at the [facility] where pt reside prior to admission. Discussed with our CM Author Raúl     Code Status: DNR  Surrogate Decision Maker: daughter  DVT Prophylaxis: NONE  GI Prophylaxis:  none  Baseline: wheelchair dependent, from the [facility]     Subjective:     Chief Complaint / Reason for Physician Visit  Pt seen at [facility]. Had large bloody bm overnight with declined in hgb  She appears to be comfortable in bed at this time. Pt's 2 daughters at bedside requesting comfort care and no further treatment. Discussed with RN events overnight.      Review of Systems:  Symptom Y/N Comments  Symptom Y/N Comments   Fever/Chills n   Chest Pain n    Poor Appetite    Edema     Cough    Abdominal Pain n    Sputum    Joint Pain     SOB/SANDOVAL n   Pruritis/Rash     Nausea/vomit    Tolerating PT/OT     Diarrhea    Tolerating Diet     Constipation    Other       Could NOT obtain due to:      Objective:     VITALS:   Last 24hrs VS reviewed since prior progress note. Most recent are:  Patient Vitals for the past 24 hrs:   Temp Pulse Resp BP SpO2   02/05/18 1057 97.9 °F (36.6 °C) (!) 108 20 113/65 99 %   02/05/18 0911 - - (!) 32 - -   02/05/18 0745 97.8 °F (36.6 °C) (!) 102 18 140/67 99 %   02/05/18 0650 98 °F (36.7 °C) (!) 109 20 (!) 155/96 100 %   02/05/18 0545 97.5 °F (36.4 °C) 92 20 125/75 100 %   02/05/18 0445 97.3 °F (36.3 °C) (!) 107 20 114/57 99 %   02/05/18 0415 97.4 °F (36.3 °C) 99 18 103/49 100 %   02/05/18 0400 97.9 °F (36.6 °C) (!) 104 18 125/62 100 %   02/05/18 0336 97.6 °F (36.4 °C) (!) 108 18 104/61 100 %   02/05/18 0006 97.4 °F (36.3 °C) (!) 117 23 123/55 100 %   02/04/18 2128 97.8 °F (36.6 °C) (!) 128 23 123/51 95 %   02/04/18 1544 98.2 °F (36.8 °C) 99 24 158/73 99 %     No intake or output data in the 24 hours ending 02/05/18 1448     PHYSICAL EXAM:  General: WD, WN. NAD  EENT:  EOMI. Anicteric sclerae. MMM  Resp:  CTA bilaterally, no wheezing or rales. No accessory muscle use  CV:  Regular  rhythm,  No edema  GI:  Soft, Non distended, Non tender.  +Bowel sounds  Neurologic:  Alert and oriented X 2  Psych:   Not anxious nor agitated  Skin:  No rashes. No jaundice    Reviewed most current lab test results and cultures  YES  Reviewed most current radiology test results   YES  Review and summation of old records today    NO  Reviewed patient's current orders and MAR    YES  PMH/SH reviewed - no change compared to H&P  ________________________________________________________________________  Care Plan discussed with:    Comments   Patient x    Family  x daughters   RN x    Care Manager x    Consultant  x hospice                     Multidiciplinary team rounds were held today with , nursing, pharmacist and clinical coordinator. Patient's plan of care was discussed; medications were reviewed and discharge planning was addressed. ________________________________________________________________________  Total NON critical care TIME:  35   Minutes    Total CRITICAL CARE TIME Spent:   Minutes non procedure based      Comments   >50% of visit spent in counseling and coordination of care     ________________________________________________________________________  Leopold Hamming, MD     Procedures: see electronic medical records for all procedures/Xrays and details which were not copied into this note but were reviewed prior to creation of Plan. LABS:  I reviewed today's most current labs and imaging studies.   Pertinent labs include:  Recent Labs      02/04/18 2303 02/04/18   1002   WBC  11.7*  12.7*   HGB  7.0*  13.2   HCT  21.9*  39.0   PLT  133*  117*     Recent Labs      02/04/18 2303 02/04/18   1002   NA   --   136   K   --   4.2   CL   --   103   CO2   --   25   GLU   --   197*   BUN   --   15   CREA   --   0.78   CA   --   8.9   ALB   --   3.2*   TBILI   --   4.2*   SGOT   --   114*   ALT   --   162*   INR  1.8*   --        Signed: Leopold Hamming, MD

## 2018-02-05 NOTE — PROGRESS NOTES
Pressure Ulcer Prevention Alert Received for Talat < 14 (moderate risk).        Care Plan/Interventions for Nursin. Complete Talat Pressure Ulcer Risk Scale and use sub scores to identify appropriate interventions. 2. Perform Assessment: skin, changes in LOC, visual cues for pain, monitor skin under medical devices  3. Respond to Reduced Sensory Perception: changes in LOC, check visual cues for pain, float heels, suspension boots, pressure redistribution bed/mattress/chair cushion, turning and reposition approximately every 2 hours (pillows & wedges), pad between skin to skin, turn & reposition  4. Manage Moisture: absorbent under pads, internal / external urinary device, internal /  external fecal device, minimize layers, contain wound drainage, access need for specialty bed, limit adult briefs, maintain skin hydration (lotion/cream), moisture barrier, offer toileting every hour  5. Promote Activity: increase time out of bed, chair cushion, PT/OT evaluation, trapeze to reposition, pressure redistribution bed/mattress/chair  6. Address Reduced Mobility: float heels / suspension boot, HOB 30 degrees or less, pressure redistribution bed/mattress/cushion, PT / OT evaluation, turn and reposition approximately every 2 hours (pillows & wedges)  7. Promote Nutrition: document food / fluid / supplement intake, encourage/assist with meals as needed  8. Reduce Friction and Shear: transferring/repositioning devices (lift/draw sheet), lift team/ patient mobility team, feet elevated on foot rest, minimize layers, foam dressing / transparent film / skin sealants, protective barrier creams and emollients, transfer aides (board, Cordell lift, ceiling lift, stand assist), HOB 30 degrees or less, trapeze to reposition.   Wound Care Team

## 2018-02-05 NOTE — PROGRESS NOTES
Gi    Received consult  Patient has been seen by Dr Rachelle Contreras in the past    I left voice mail for Dr Jesus King MD  8:08 AM  2/5/2018

## 2018-02-05 NOTE — PROGRESS NOTES
Bedside and Verbal shift change report given to Hunter Alberto (oncoming nurse) by Gerald Venegas (offgoing nurse). Report included the following information SBAR, Kardex, Intake/Output, MAR and Recent Results. Zone Phone for oncoming shift:   [phone]    Shift Summary: Patient rested quietly throughout the night. Patient experienced 2 large bloody BMs. Hospitalist notified and came to bedside to evaluate the patient. Orders placed. Patient completed unit 1 of 2 of PRBC, without any problems. LDAs               Peripheral IV 02/04/18 Right Antecubital (Active)   Site Assessment Clean, dry, & intact 2/5/2018  3:36 AM   Phlebitis Assessment 0 2/5/2018  3:36 AM   Infiltration Assessment 0 2/5/2018  3:36 AM   Dressing Status Clean, dry, & intact 2/5/2018  3:36 AM   Dressing Type Tape;Transparent 2/5/2018  3:36 AM   Hub Color/Line Status Pink; Infusing 2/5/2018  3:36 AM   Action Taken Blood drawn 2/4/2018 10:13 AM       Peripheral IV 02/04/18 Left Hand (Active)   Site Assessment Clean, dry, & intact 2/5/2018  3:36 AM   Phlebitis Assessment 0 2/5/2018  3:36 AM   Infiltration Assessment 0 2/5/2018  3:36 AM   Dressing Status Clean, dry, & intact 2/5/2018  3:36 AM   Dressing Type Tape;Transparent 2/5/2018  3:36 AM   Hub Color/Line Status Pink; Infusing 2/5/2018  3:36 AM   Action Taken Blood drawn 2/4/2018 10:14 AM                        Intake & Output     Last Bowel Movement Last Bowel Movement Date: 02/04/18   Glucose Checks [] N/A  [x] AC/HS  [] Q6  Concerns:   Nutrition Active Orders   Diet    DIET DYSPHAGIA ADVANCED SOFT (NDD3)       Consults [x]PT  [x]OT  [x]Speech  []Case Management   Cardiac Monitoring []N/A [x]Yes Expires: 24 hours

## 2018-02-05 NOTE — PROGRESS NOTES
Pt admitted with dysphagia//anemia. Pt lives with dtr or [facility], has a completed DDNR, PCP Dr Anthony Rothman, wheelchair bound, needs 24/7 assist, has Medicare//AARP, and is awaiting [facility] [place] [facility] consult. Palliative Care has seen the pt in the past.  Pt apparently is in need of Medicaid. Will speak with dtr regarding d/c planning. 1645  D/C plans discussed with Dr Claudell Cassis. I've left a VM for the [facility] asking with whom do they contract hospice with and if pt can return with hospice. She will call me back in the am.      Care Management Interventions  PCP Verified by CM:  Yes  Mode of Transport at Discharge: Self  Transition of Care Consult (CM Consult): Discharge Planning  MyChart Signup: No  Discharge Durable Medical Equipment: Yes  Health Maintenance Reviewed: Yes  Physical Therapy Consult: No  Occupational Therapy Consult: No  Speech Therapy Consult: No  Current Support Network: Family Lives Nearby, Lives with Caregiver, Relative's Home  Confirm Follow Up Transport: Family  Plan discussed with Pt/Family/Caregiver: Yes  Freedom of Choice Offered: Yes  Discharge Location  Discharge Placement: Home

## 2018-02-05 NOTE — PROGRESS NOTES
Physical Therapy    Orders received, chart reviewed and cleared by nurse to see patient. Two daughters present at time of PT arrival.  They were having a discussion with OT. They do not want therapy. They are looking for comfort care. Will honor their wishes. Will sign off at this time.     Unknown Denisse, PT

## 2018-02-05 NOTE — PROGRESS NOTES
Speech pathology note  Reviewed chart and discussed case with RN. Patient's family present and politely refused swallowing evaluation, stating that patient is in hospice. Will respect their wishes and sign off. Thank you.     Bettina Mccurdy, CCC-SLP

## 2018-02-05 NOTE — PROGRESS NOTES
All in agreement with [facility] consult per MD request.  Order place in [facility] with them

## 2018-02-05 NOTE — PROGRESS NOTES
Father Hien Malik responded to my phone call. He will visit patient tomorrow morning for Anointing of the Sick. I will visit to offer Communion.   SHARI Garza, [facility], [address] [place] [address] Avenue    [address] [place] [address] Road Paging Service  [phone])

## 2018-02-05 NOTE — DISCHARGE SUMMARY
Discharge Summary      Name: Mario Alberto Oliveira  [id number]  YOB: 1930 (Age: 80 y.o.)   Date of Admission: 2/4/2018  Date of Discharge: 2/5/2018  Attending Physician: Michelle Torres MD    Discharge Diagnosis:   Acute blood loss anemia  Sepsis due to UTI  Dysphagia    Elevated LFT/INR with abd discomfort  Thrombocytopenia  T2DM  Hx of CVA with residual L sided weakness/debility   Dementia with depression  CAD s/p CABG, chronic diastolic CHF, paroxysmal afib and benign HTN    Consultations:  Hospice     Brief Admission History/Reason for Admission Per Michelle Torres MD:   Lilian Deluca is a 80 y.o.  female w pmhx significant for CVA with residual L side paresis,  HTN, depression, insomnia, CHF, CAD, hypercholesterolemia, hypokalemia, CVA, arthritis, DM, a-fib, heart murmur, macular degeneration, skin CA, chronic UTI, present to ED c/o vomiting and difficulty swallong started this past Friday. Pt as seen at [facility] on Friday and was given abx to UTI, however had not yet started taking it yet. Dysuria symptoms persisted so pt was taken to the ER by her daughter for further evaluation. At baseline, pt is wheelchair dependent. Lives at the [facility] and is completely dependent on staffs/family members for her ALDs. In the ER, vitals; T 98.1, P 106, /72, SpO2 100% on RA  Pertinent labs: lactate 2.3, WBC 12  We were asked to admit for work up and evaluation of the above problems. Brief Hospital Course by Main Problems:   Acute blood loss anemia  Sepsis due to UTI, treated with zosyn, now discontinued. Dysphagia    Elevated LFT/INR with abd discomfort  Thrombocytopenia  T2DM  Hx of CVA with residual L sided weakness/debility   Dementia with depression  CAD s/p CABG, chronic diastolic CHF, paroxysmal afib and benign HTN  Pt admitted for UTI, started on zosyn.   Her hospital course was complicated by acute GI bleed in the setting of asa/pradaxa use for secondary CVA prevention. Discussed with family at bedside regarding further work up and resuscitation. however family and pt politely declined further work up/treatment. Pt received 1 of 2 units prbc ordered. Family wished for no further treatment/resuscitation. Discussed risk of no further treatment could mean sudden death. Family wish to make patient comfort care. Hospice nurse consulted. Medications adjusted to reflect comfort care. Pt being evaluated by hospice team for possible inpt comfort care. If she does not meet criteria for inpt hospice, then will proceed with comfort care at the [facility] where pt reside prior to admission. Discussed with our [address] [place] [address]    Discharge Exam:  Patient seen and examined by me on discharge day. Pertinent Findings:  Visit Vitals    /67 (BP 1 Location: Left arm, BP Patient Position: At rest)    Pulse (!) 102    Temp 97.8 °F (36.6 °C)    Resp (!) 32    Wt 52.6 kg (116 lb)    SpO2 99%    BMI 22.65 kg/m2     Gen:    Not in distress  Chest: Clear lungs  CVS:   Regular rhythm.   No edema  Abd:  Soft, not distended, not tender    Discharge/Recent Laboratory Results:  Recent Labs      02/04/18   1002   NA  136   K  4.2   CL  103   CO2  25   BUN  15   GLU  197*   CA  8.9     Recent Labs      02/04/18   2303   HGB  7.0*   HCT  21.9*   WBC  11.7*   PLT  133*       Discharge Medications:  Current Discharge Medication List          DISPOSITION:    Home with Family:    Home with HH/PT/OT/RN:    SNF/LTC:    MARK:    OTHER: inpt hospice         Code: DNR    Total time in minutes spent coordinating this discharge (includes going over instructions, follow-up, prescriptions, and preparing report for sign off to her PCP) :  35  minutes

## 2018-02-05 NOTE — WOUND CARE
Wound Care Consult: chart reviewed. Pt. Assessed for her gluteal cleft / sacral skin that is a stage 1 pressure injury. This was present on admission. The lesion is red and non-blanchable but no broken skin currently. Patient is on Hospice and her two daughters are at the bedside. Pt. Has acute blood loss anemia due to GI bleed. Pt. And family have decided on No more blood transfusions. Comfort measures only. The patient was recently cleaned up just prior to this assessment from a large BM of blood. Patient also c/o a headache for which she is now medicated. Patient had two more grossly bloody BM's during this assessment requiring two linen changes. Patient did not c/o pain with any of this and she was cooperative with being moved back and forth to clean her up. Recommend ES Desitin to protect the skin from any enzymatic skin burns that cause pain.    Kumar Hu RN, BSN, CWON ([id number])

## 2018-02-05 NOTE — PROGRESS NOTES
Spiritual Care Assessment/Progress Notes    Jovany Saldivar [id number]  [id number]    3/16/1930  80 y.o.  female    Patient Telephone Number: [phone] (home)   Sabianism Affiliation: Chacho Bates   Language: English   Extended Emergency Contact Information  Primary Emergency Contact: Liz Phone: [phone]  Relation: Child  Secondary Emergency Contact: [place] Phone: [phone]  Relation: Child   Patient Active Problem List    Diagnosis Date Noted    Sepsis ([facility] 02/04/2018    S/P CABG (coronary artery bypass graft) 3 vessel 09/22/2016    Macular degeneration 09/22/2016    Stroke ([facility]     Status post placement of implantable loop recorder 07/15/2016    Tricuspid valve insufficiency 07/14/2016    Mitral valve insufficiency 07/14/2016    CVA (cerebral vascular accident) ([facility] 05/06/2016    PUD (peptic ulcer disease)     Advance care planning 01/28/2016    Unspecified vitamin D deficiency 08/21/2015    TIA (transient ischemic attack) 08/19/2013    Cancer of skin, squamous cell 07/02/2013    Occlusion and stenosis of carotid artery without mention of cerebral infarction 11/27/2012    Edema 11/07/2012    Osteoarthrosis, unspecified whether generalized or localized, unspecified site 05/31/2011    Pain in joint, shoulder region 05/31/2011    Coronary atherosclerosis of native coronary artery 09/07/2010    Essential hypertension, benign 06/01/2010    Pure hypercholesterolemia 06/01/2010    DM type 2 (diabetes mellitus, type 2) ([facility] 06/01/2010    Acute upper respiratory infections of unspecified site 06/01/2010        Date: 2/5/2018       Level of Sabianism/Spiritual Activity:  [x]         Involved in shailesh tradition/spiritual practice    []         Not involved in shailesh tradition/spiritual practice  [x]         Spiritually oriented    []         Claims no spiritual orientation    []         seeking spiritual identity  []         Feels alienated from Scientologist practice/tradition  []         Feels angry about Scientologist practice/tradition  [x]         Spirituality/Scientologist tradition is a resource for coping at this time. []         Not able to assess due to medical condition    Services Provided Today:  []         crisis intervention    []         reading Scriptures  [x]         spiritual assessment    [x]         prayer  [x]         empathic listening/emotional support  [x]         rites and rituals (cite in comments)  []         life review     []         Scientologist support  []         theological development    []         advocacy  []         ethical dialog     []         blessing  []         bereavement support    [x]         support to family  []         anticipatory grief support   []         help with AMD  []         spiritual guidance    []         meditation      Spiritual Care Needs  [x]         Emotional Support  [x]         Spiritual/Episcopal Care  [x]         Loss/Adjustment  []         Advocacy/Referral                /Ethics  []         No needs expressed at               this time  []         Other: (note in               comments)  [address]  []         Follow up visits with               pt/family  []         Provide materials  [x]         Schedule sacraments  [x]         Contact Community               Clergy  [x]         Follow up as needed  []         Other: (note in               comments)     Comments:  Responded to call from [address], [facility] as patient was requesting [employer]. Patient's two daughters were present. Provided prayer and offered patient Holy Communion. Patient also shared that she would like to be anointed. Contacted Rev. Day Whittaker on her behalf; waiting for return call. Will continue to visit patient daily for Communion and to support her and her family as needed.     Yahir Soto, MPS, [address] [facility], [address] [place] [address] Paging Service  [phone]

## 2018-02-05 NOTE — PROGRESS NOTES
Spoke to pts daughters at bedside. Pt is from [facility] and family is hoping for hospice services at discharge. They prefer for pt not to return to the [facility] if possible. Pt is wheelchair bound and total assist for ADLS. Family's hope is for comfort care but they have not met with hospice and do not like palliative services (\"they are useless\"). Nurse aware. Family are in agreement that OT services are not indicated at this time and agree for therapy to sign off.

## 2018-02-06 NOTE — PROGRESS NOTES
Oncology Nursing Communication Tool  6:24 PM  2/6/2018     Bedside and Verbal shift change report given to OLIVIER Lopez (incoming nurse) by Soy Noe (outgoing nurse) on [facility]. Report included the following information SBAR, Kardex, Procedure Summary, Intake/Output, MAR, Accordion and Recent Results. Shift Summary: Pt rested comfortably throughout shift. Minimal response to stimuli throughout shift, prn Morphine x1 premedicated for incontinence care. Issues for physician to address: none        Oncology Shift Note   Admission Date 2/5/2018   Admission Diagnosis GI Bleed  GI bleed  GI bleed   Code Status Prior   Consults None      Cardiac Monitoring [] Yes [] No      Purposeful Hourly Rounding [] Yes    Raysa Score Total Score: 4   Raysa score 3 or > [] Bed Alarm [] [employer] [] 1:1 sitter [] Patient refused (Place signed refusal form in chart)      Pain Managed [] Yes [] No    Key Pain Meds             acetaminophen (TYLENOL ARTHRITIS PAIN) 650 mg CR tablet Take 1 Tab by mouth two (2) times daily as needed for Pain. Influenza Vaccine Received Flu Vaccine for Current Season (usually Sept-March): Yes           Oxygen needs?  [] Room air Oxygen @  []1L    []2L    []3L   []4L    []5L   []6L     Use home O2? [] Yes [] No  Perform O2 challenge test using  smartphrase (.oxygenchallenge)      Last bowel movement Last Bowel Movement Date: 02/05/18  bowel movement      Urinary Catheter             LDAs               Peripheral IV 02/04/18 Right Antecubital (Active)   Site Assessment Clean, dry, & intact 2/6/2018 10:59 AM   Phlebitis Assessment 0 2/6/2018 10:59 AM   Infiltration Assessment 0 2/6/2018 10:59 AM   Dressing Status Clean, dry, & intact 2/6/2018 10:59 AM   Dressing Type Transparent;Tape 2/6/2018 10:59 AM   Hub Color/Line Status Pink;Flushed;Patent 2/6/2018 10:59 AM   Action Taken Blood drawn 2/4/2018 10:13 AM                         Readmission Risk Assessment Tool Score High Risk            27       Total Score        3 Has Seen PCP in Last 6 Months (Yes=3, No=0)    4 IP Visits Last 12 Months (1-3=4, 4=9, >4=11)    20 Charlson Comorbidity Score (Age + Comorbid Conditions)        Criteria that do not apply:    . Living with Significant Other. Assisted Living. LTAC. SNF. or   Rehab    Patient Length of Stay (>5 days = 3)    Pt.  Coverage (Medicare=5 , Medicaid, or Self-Pay=4)       Expected Length of Stay - - -   Actual Length of Stay [address] [facility]

## 2018-02-06 NOTE — HOSPICE
[facility] Help to Those in Need  [phone]    Inpatient Nursing Admission   Patient Name: Timmy Caal  YOB: 1930  Age: 80 y.o.        Date of Hospice Admission: 2/5/2018  Hospice Attending Elected by Patient: Irma Irizarry MD  Primary Care Physician: Susana Aden MD  Admitting RN: Chan Pascual  : Not present       Level of Care Routine  Facility of Care: [facility]  Patient Room: [address] [place] [address] Dr   ER Visits/ Hospitalizations in past year:   Hospice Diagnosis: GI Bleed  GI bleed  GI bleed  Onset Date of Hospice Diagnosis:   Summary of Disease Progression Leading to Hospice Diagnosis:     Co-Morbidities:   Patient Active Problem List   Diagnosis Code    Essential hypertension, benign I10    Pure hypercholesterolemia E78.00    DM type 2 (diabetes mellitus, type 2) ([facility] E11.9    Acute upper respiratory infections of unspecified site J06.9    Coronary atherosclerosis of native coronary artery I25.10    Osteoarthrosis, unspecified whether generalized or localized, unspecified site M19.90    Pain in joint, shoulder region M25.519    Edema R60.9    Occlusion and stenosis of carotid artery without mention of cerebral infarction I65.29    Cancer of skin, squamous cell C44.92    TIA (transient ischemic attack) G45.9    Unspecified vitamin D deficiency E55.9    Advance care planning Z71.89    CVA (cerebral vascular accident) ([facility] I63.9    PUD (peptic ulcer disease) K27.9    Tricuspid valve insufficiency I07.1    Mitral valve insufficiency I34.0    Status post placement of implantable loop recorder Z95.818    Stroke ([facility] I63.9    S/P CABG (coronary artery bypass graft) 3 vessel Z95.1    Macular degeneration H35.30    Sepsis ([facility] A41.9    GI bleed K92.2     Diagnoses RELATED to the terminal prognosis: Upper GI bleed   Dyspnagia   Anemia   Urosepsis    Other Diagnoses:    Dementia  CAD   Diastolic CHF   HX CVA   DM II   afib    Rationale for a prognosis of life expectancy of 6 months or less if the disease follows its normal course (Disease Specific History):   Pt sent to Ed for dysphagia and admitted for GI bleed yesterday. Had large melena to maroon stools last night and again this afternoon. Hb dropped from 13 yesteray to 7.0 today. Transfused 1 unit prbc's. No GI endoscopy. Ongoing abdominal discomfort and headaches. Pt also with UTI--GNR in urine and blood cultures-started on iv abx, pt able to take in liquids only. Pt and family request comfort care only. Admitted to hospice today  The patient's principle diagnosis has resulted in sever anemia, abdominal pain and headaches     Luz Baldwin is a 80 y.o. who was admitted to [facility]. The patient's principle diagnosis of GI bleed  has resulted in patient being restless, unable to comfortable after frequent repositioning, thirst, headache, abdominal pain. Functionally, the patient's Palliative Performance Scale has declined over a period of 2 days  and is estimated at  20. Objective information that support this patients limited prognosis includes: . LABS as below        The patient/family chose comfort measures with the support of Hospice. Patient meets for Routine  LOC as evidenced by patient with uncontrolled symptoms of pain, dyspnea and restlessness yet no symptom meds had been given.            ASSESSMENT    Patient self-reports:  [x]  Yes        SYMPTOMS:     SIGNS/PHYSICAL FINDINGS: Patient lethargic, complaining of headache frequently holding her forehead, pale, skin cool, crackles audible upper airways with occasional wet cough, peripheral iv site RAC unremarkable, abdomen is rounded and tender to gentle touch with bowel sounds, pedal pulses very faint, incontinent of urine      KARNOFSKY: 20  FAST for all dementia:  7B      CLINICAL INFORMATION     Wt Readings from Last 3 Encounters:   02/04/18 52.6 kg (116 lb)   10/24/17 52.6 kg (116 lb)   10/15/17 55.2 kg (121 lb 11.2 oz)     Ht Readings from Last 3 Encounters:   10/24/17 5' (1.524 m)   10/14/17 5' (1.524 m)   10/15/17 5' (1.524 m)             LAB VALUES  WBC 11.7   HGB 2/4 1000 13.2     HBG 2/4 2300  7.0      Lab Results   Component Value Date/Time    Protein, total 6.6 02/04/2018 10:02 AM    Albumin 3.2 02/04/2018 10:02 AM       Currently this patient has:  [x] Supplemental O2 [x] Peripheral IV      PLAN     1. Headache/abdominal pain/ tachypnea/restlessness   Morphine 2 mg IV q 1 hr prn   Lorazepam 1 mg IV q 1 hr prn    O2 4 lpm nc prn   2. Adventitious lung sounds with wet non productive cough. Scopolamine patch scheduled and Glycopyrrolate 0.2 mg IV q 4 hours prn   3. Potential for fever and not able to access rectally    Toradol 15 mg IV q 6 hours prn  4. Potential for nausea   Ondansetron 4 mg IV q 4 hours prn     Hospice Team Frequency Orders:  Skilled Nurse -  Every other day x 7 days  with 5 PRN visits for symptom control. MSW  1 visit for initial assessment/evaluation for family support and need for volunteer services. Bharathi Guzmán  1 visit for initial assessment/evaluation for spiritual support.      ADVANCE CARE PLANNING (Complete in ACP Flow Sheet)   Code Status: DNR  Durable DNR:  [x]  No  Code Status Discussed/Confirmed:  Preference for Other Life Sustaining Treatment Discussed/Confirmed:  Hospitalization Preference:   family prefers for patient to remain inpatient     Advance Care Planning 2/4/2018   Patient's Healthcare Decision Maker is: Verbal statement (Legal Next of Kin remains as decision maker)   Primary Decision Maker Name Lynda Darden   Primary Decision Maker Phone Number [phone]   Primary Decision Maker Relationship to Patient Adult child   Secondary Decision Maker Name Ave Chery   Secondary Decision Maker Phone Number [phone]   Secondary Decision Maker Relationship to Patient Adult child   Confirm Advance Directive Yes, on file   Does the patient have other document types Do Not Resuscitate  Service: [] Yes  []  No      [] Unknown  Appropriate for Pinning Ceremony:  [] Yes     [] No  Zoroastrian: PRESBYTERIAN   Home: [place]     1. Discharge Plan: It is expected that patient will pass while inpatient yet [facility] is an option  2. Patient/Family teaching: Education provided in why patient was having the symptoms she was  And of med regimen in place for those symptoms   INSTRUCTED TO CALL HOSPICE WITH CONCERS/QUESTIONS/NEEDS  3. Response to patient/family teaching: understanding and appreciation expressed        SOCIAL/EMOTIONAL/SPIRITUAL NEEDS     Spiritual Issues Identified: none    Psych/ Social/ Emotional Issues Identified: 2 daughters Sho Mcdonald and Susan Osborne have cared for patient for years in spite of all the difficulty that goes along with the anger expressed by patient's with dementia at times. They love their mother very much and feel it is time for her to be at peace. There was another daughter who passed and these 2 are raising that daughter's son   Also son present later on in the day    Yolanda Cobb is MPOA    Caregiver Support:  [x] Provided information on End of Life Care   [] Material Provided: Gone From My Sight or Cale Masterson  contacted, discharge to hospice order received  Dr. Beth Espinosa contacted, agrees to serve as attending provider for hospice and provided verbal certification of terminal illness with life expectancy of 6 months or less. Orders for hospice admission, medications and plan of treatment received. Medication reconciliation completed. MEDS: See medication list below  DME: Per hospital  Supplies: Per hospital  IDT communication to include MD, SN, SW, CH and support team    ALLERGIES AND MEDICATIONS     Allergies:    Allergies   Allergen Reactions    Aleve [Naproxen Sodium] Other (comments)     Caused stomach ulcer and thrush    Eliquis [Apixaban] Other (comments)     dizziness    Labetalol Other (comments)     dizzyness    Metformin Diarrhea     With both IR and ER     Plavix [Clopidogrel] Other (comments)     Pt.  Said it was not effective     Current Facility-Administered Medications   Medication Dose Route Frequency    saline peripheral flush soln 5 mL  5 mL InterCATHeter PRN    ondansetron (ZOFRAN) injection 4 mg  4 mg IntraVENous Q4H PRN    LORazepam (ATIVAN) injection 0.5 mg  0.5 mg IntraVENous Q1H PRN    ketorolac (TORADOL) injection 15 mg  15 mg IntraVENous Q6H PRN    scopolamine (TRANSDERM-SCOP) 1 mg 1 Patch  1 Patch TransDERmal Q72H PRN    glycopyrrolate (ROBINUL) injection 0.2 mg  0.2 mg IntraVENous Q4H PRN    morphine injection 2 mg  2 mg IntraVENous Q1H PRN

## 2018-02-06 NOTE — PROGRESS NOTES
TRANSFER - IN REPORT:    Verbal report received from Katlyn(name) on Malou Mac  [facility] in [facility] condition(hospice)      Report consisted of patients Situation, Background, Assessment and   Recommendations(SBAR). Information from the following report(s) SBAR, Kardex, Procedure Summary, Intake/Output, MAR, Accordion and Recent Results was reviewed with the receiving nurse. Opportunity for questions and clarification was provided. Assessment completed upon patients arrival to unit and care assumed.

## 2018-02-06 NOTE — PROGRESS NOTES
Bedside and Verbal shift change report given to Katlyn AGUAYO (oncoming nurse) by Shahram Mccullough (offgoing nurse). Report included the following information SBAR, Kardex, Intake/Output, MAR and Recent Results. Zone Phone for oncoming shift:   [phone]    Shift Summary: Patient rested quietly throughout the night. PRN pain medication given per pain scale. LDAs               Peripheral IV 02/04/18 Right Antecubital (Active)   Site Assessment Clean, dry, & intact 2/6/2018  3:45 AM   Phlebitis Assessment 0 2/6/2018  3:45 AM   Infiltration Assessment 0 2/6/2018  3:45 AM   Dressing Status Clean, dry, & intact 2/6/2018  3:45 AM   Dressing Type Tape;Transparent 2/6/2018  3:45 AM   Hub Color/Line Status Pink;Flushed 2/6/2018  3:45 AM   Action Taken Blood drawn 2/4/2018 10:13 AM                        Intake & Output   Date 02/05/18 0700 - 02/06/18 0659 02/06/18 0700 - 02/07/18 0659   Shift [id number] 1900-0659 24 Hour Total 6179-2149 7304-3878 24 Hour Total   I  N  T  A  K  E   P.O.  120 120         P. O.  120 120       Shift Total  120 120      O  U  T  P  U  T   Shift Total         NET  120 120      Weight (kg)            Last Bowel Movement Last Bowel Movement Date: 02/05/18   Glucose Checks [x] N/A  [] AC/HS  [] Q6  Concerns:   Nutrition Active Orders   Diet    DIET FULL LIQUID       Consults []PT  []OT  []Speech  []Case Management   Cardiac Monitoring [x]N/A []Yes Expires:

## 2018-02-06 NOTE — PROGRESS NOTES
Patient was anointed with the Sacrament of the Sick on February 6, 2018.   Documented by  SHARI Lucas, [facility], [address] [place] [address] Paging Service  [phone]

## 2018-02-06 NOTE — PHYSICIAN ADVISORY
Short Stay Review       Pt Name:  Mario Alberto Oliveira   MR#  [id number]   CSN#   [id number]   [address] [place] [address]  [phone]  @ [facility]   Hospitalization date  2/4/2018  9:43 AM   Current Attending Physician  Francesco Schulte MD     A discharge order has been placed for this episode of hospital care for Ms. Mario Alberto Oliveira; since this hospital stay is less than two midnights, I reviewed MsPeggy Amber Jose Ward's chart.      Ms. Christophre Moreno healthcare insurance/benefit include:  Payor: Alison Cole / Plan: Sandra Boo / Product Type: Medicare /     Utilization Review related clinical summary:   The pt was discharged to hospice care     On the basis of chart review, this patient's hospitalization status      is appropriate for Laney Lopez MD MPH FACP   Physician Advisor    [address] [place] [address]   Utilization Review, Care Management         CSN:  [id number]   TERESSA:   [id number]  Admitted on :  2/4/2018   Discharge order  2/5/2018

## 2018-02-06 NOTE — HOSPICE
Completed spiritual assessment with the patient's daughters. The patient was sleeping for th first time today and they did not want to wake her up. The patient grew up [address] She  a Buddhist and left the [employer] to attend a [employer] and then later a [employer] where she found good fellowship. When she found out she was dying she requested Communion and the Annointing of the [address] Er daughters said she was always a Jewish at heart and was at peace knowing she had partaken of the Sacraments before death. The patient is aware she is dying and is at peace. She has been asking to die for years. Her daughters are at peace with the decisions they have made to place the patient in Hospice. The  and daughters had an ethical discussion about healthcare, the end-of-life and God and how they connect and are kept from connecting. There are no anxieties or worries at this time. They acknowledge their grief while at the same time their relief that the patient's suffering is almost over. The  validated these emotions and thoughts. Spiritual acuity - 1. The patient has a strong shailesh and has received end-of-life sacraments. No immediate spiritual care issues.       Frequency: 1 wk 2; 5 PRN 14

## 2018-02-06 NOTE — HOSPICE
[address] [facility] Help to Those in Need  [phone]    Social Work Admission Note  Patient Name: Gwen Short  YOB: 1930  Age: 80 y.o. Date of Visit: 18  Facility of Care: [facility]  Patient Room: 1112/01     Hospice Attending: Janiece Rubinstein, MD  Hospice Diagnosis: GI Bleed  GI bleed  GI bleed    Level of Care:    []  GIP    []  Respite   [x]  Routine    NARRATIVE   This MSW visited the room of pt who was resting, MSW met with pts children Eleuterio Francois, and daughters Latonya Viramontes ( [phone]) and Seth Lucas ( [phone]), and grandson for initial SW assessment. Pt is a 80year old CF with a hospice diagnosis of upper GI bleed, with comorbid dementia, CAD, diastolic CHF, hx of cva, DM II, and a-fib. Pt was admitted 2018 from the [facility] due to GI bleed. Pt is , her   in . Pt and her  were living in [facility] when he . Pt then moved to [place] to be near family. Family describe pt as active and a strict parent. Family reported pt had a good life traveling the world, and completed her bucket list twice over, until  became sick and . Family reports pt was unhappy as her health declined and hearing issues and vision issues became worse, keeping her form doing the things she enjoyed such as reading. MSW and family discussed pts poor QOL with multiple health issues and dementia. Daughters/son reported yesterday was difficult as they realized pt would not come back from this. Daughters have been pts caregiver for the past several years and report pts anger and cognitive issues related to her dementia have become worse over time. MSW provided active listening as family vented frustration with the above. Family is more accepting of pts terminality as they are grateful she is finally at peace. MSW provided emotional support and grief counseling for anticipatory grief and loss associated with pts EOL.  MSW and family discussed loss of father, and sister. Sue Greenberg is raising  sisters son, she moved with him when he was 6. MSW and family discussed sons coping and any needs he may have at this time. Family report he is well supported by his Taoist and family. MSW shared Bereavement information for grief support. MSW shared [facility] for information re the EOL process. Pt is [address] [facility], but was raised [employer] [place]. ADVANCE CARE PLANNING    Code Status: DDNR Durable DNR: X_ Yes  _ No  Advance Care Planning 2018   Patient's Healthcare Decision Maker is: Verbal statement (Legal Next of Kin remains as decision maker)   Primary Decision Maker Name Kellee Duong   Primary Decision Maker Phone Number [phone]   Primary Decision Maker Relationship to Patient Adult child   Secondary Decision Maker Name Adonay Rao   Secondary Decision Maker Phone Number [phone]   Secondary Decision Maker Relationship to Patient Adult child   Confirm Advance Directive Yes, on file   Does the patient have other document types Do Not Resuscitate       Relationship Status:  []  Single     []        []      []  Domestic Partner     [x]  /  []  Common Law  []    []  Unknown    If in a relationship, name of partner/spouse:  Duration of relationship:    Tenriism: PRESBYTERIAN     Home:   Resources Provided:     Social Work Initial Assessment     Gender:  female    Race/Ethnicity: (zohreh all that apply)  []  [place]) or [place] Native  []    []  Black or   []   or   []  [address] [facility] or [facility]  [x]  Norvel Marines  []  Unknown      Service:    []  Yes   [x]  No       []  Unknown  Appropriate for Pinning Ceremony:   []  Yes      [x]  No  Is patient using VA benefits?    []  Yes      [x]  No     Primary [address] [facility]  []   Needed  []   utilized during visit    Ability to express thoughts/needs/feelings  []  Expressed thoughts/feelings/needs without difficulty  [] Requires extra time and cuing  []  Speech limited single words  []  Uses only gestures (eye, blinking eye or head movement/pointing)  []  Unable to express thoughts/feelings/needs (speech unintelligible or inappropriate)  [x]  Unresponsive  Notes:      Mental Status:  []  Alert-oriented to:     []  Person     []  Place     []  Time  []  Comatose-responds to:    []   Verbal stimuli    []  Tactile stimuli    []  Painful stimuli  []  Forgetful  []  Disoriented/Confused  []  Lethargic  []  Agitated  [x]  Other (specify):  Unresponsive    Notes:      Patients description of Illness/Current Health Status:    [x]  Patient unable to discuss Unresponsive    []  Patient unwilling to discuss  []  (Specify)        Knowledge/Understanding of Disease Process  Patient:    []  Demonstrates knowledge/understanding of disease process   []  Demonstrates knowledge/understanding of treatment plan   []  Demonstrates knowledge/understanding of prognosis   []  Demonstrates acceptance of prognosis   []  Demonstrates knowledge/understanding of resuscitation status   [x]  Other (specify)Unresponsive     Caregiver:   [x]  Demonstrates knowledge/understanding of disease process   [x]  Demonstrates knowledge/understanding of treatment plan   [x]  Demonstrates knowledge/understanding of prognosis   [x]  Demonstrates acceptance of prognosis   [x]  Demonstrates knowledge/understanding of resuscitation status   [x]  Other (specify)DAUGHTERS  HAVE BEEN CARING FOR PT FOR A LONG TIME  Notes:      Patients living arrangement/care setting:  Use the PRIOR COLUMN when the PATIENTS current health status necessitated a change in his/her primary residence.     Prior Current Response              []             []    Patients own home/residence              [x]             []    Home of family member/friend              []             []    Boarding home              [x]             []    Assisted living facility/longterm center              [] [x]    Hospital/Acute care facility              []             []    Skilled nursing facility              []             []    Long term care facility/Nursing home              []             [x]    Hospice in Patient      Primary Caregiver:  []  No Primary Caregiver  Name of Primary Caregiver: DYLLAN FOY  Relationship or Primary Caregiver:    []  Spouse/Significant other       [x]  Natural Child        []  Step child       []  Sibling   []  Parent   []  Friend/Neighbor   []  Community/Jewish Volunteer   []  Paid help   []  Other (specify):___________  Notes:       Family members/Significant others:  Joelle Romero  Relationship:DAUGHTER   Phone Number:[phone]  Actively involved in care? [x]  Yes  []  No    Name: Hakeem Castorena   Relationship:DAUGHTER   Phone Number: [phone]  Actively involved in care? [x]  Yes  []  No    Name:  Relationship:  Phone Number:  Actively involved in care?   []  Yes  []  No    Social support systems: (select ONE best description)  [x]  Excellent social support system which includes three or more family members or friends  []  Good social support system which includes two or less members or friends  []  [address] support which includes one family member or friend  []  Poor social support; no family members or friends; basically ALONE  Notes:      Emotional Status: (zohreh all that apply)    Patient Caregiver Response                 [x]                [x]    Mood/Affect stable and appropriate                   []                []    Angry                 []                []    Anxious                 []                []    Apprehensive                 []                []    Avoidant                 []                []    Clinging                 []                []    Depressed                 []                []    Distraught                 []                []    Elated                 []                []    Euphoric                 []                []    Fearful []                []    Flat Affect                 []                []    Helpless                 []                []    Hostile                 []                []    Impulsive                 []                []    Irritable                 []                []    Labile                 []                []    Manic                 []                []    Restlessness                 []                [x]    Sad                 []                []    Suspicious                 []                []    Tearful                 []                []    Withdrawn     Notes:     Coping Skills (strengths/weakness):    Patient: Coping Skills (strength/weakness):  Unresponsive   Family/caregiver (strength/weakness): DAUGHTERS HAVE BEEN PTS LONG TIME CAREGIVER, PT HAS HAD ANGER AND ISSUES OF PARANOIA AND HALLUCINATIONS THAT HAVE BEEN HARD FOR SISTERS TO COPE WITH, MULTIPLE LOSSES W/IN THE FAMILY , FATHER  AND SISTER WAS MURDERED,     [place] of care (zohreh all that apply):     [x]  No burden evident   []  Family must administer medications   []  Illness causing financial strain   []  Family/Support feels overwhelmed   []  Family/Support sleep disturbed with patients care   []  Patients care causes extra physical stress  of death  []  Illness causes changes in family lifestyle  []  Illness impacting family/support employment  []  Family experiencing increased time demands  []  Patients behavior endangers family  []  Denial of patients illness  []  Concern over outcome of illness/fear  []  Patients behavior embarrassing to family   Notes:      Risk Factors: (zohreh all that apply):    [x]  No burden evident   []  Alcohol abuse  []  Financial resources inadequate to meet basic needs (food/house/etc)  []  Financial resources inadequate to meet health care needs (supplies/equipment/medications)  []  Food/nutrition resources inadequate  []  Home environment unsafe/inadequate for home care  []  Homicidal risk  []  Lives alone or without concerned relatives  []  Multiple medications/complex schedule  []  Physical limitations increase likelihood of falls  []  Plan of care/treatments complicated  []  Substance use/abuse  []  Suicidal risk  []  Visual impairment threatens safety/ability to perform self-care  []  Other (specify):     Abuse/Neglect (actual/potential risks):  [x]  No signs of abuse/neglect  []  History of abuse/neglect                 []  WDZAVCOW          []  Sexual  []  History of domestic violence  []  Lacks adequate physical care  []  Lacks emotional nurturing/support  []  Lacks appropriate stimulation/cognitive experiences  []  Left alone inappropriately  []  Lacks necessary supervision  []  Inadequate or delayed medical care  []  Unsafe environment (i.e guns/drug use/history of violence in the home/etc.)  []  Bruising or other physical signs of injury present  []  Other (specify):  Notes:   []  Refer to child/adult protective services      Current Sources of Stress (in Addition to Current Illness):   [x]  None reported  []  Bills/Debt    []  Career/Job change    []   (short term)    []   (long term)    []  Death of a child (recent)    []  Death of a parent (recent)   []  Death of a spouse (recent)   []  Employment status changed   []  Family discord    []  Financial loss/Inadequate inther (specify):come  []  Job loss  []  Legal issues unresolved  []  Lifestyle change  []  Marital discord  []  Marriage within the last year  []  Paperwork (insurance/legal/etc) overwhelming  []  Separation/Divorce  []  Other (specify):  Notes:      Current [employer] Being Utilized     1. Interventions/Plan of Care     1. Assess social and emotional factors related to coping with end of life issues  2. Community resource planning/referral   3. Relocation to different care setting if/when symptoms stabilize WILL LIKELY PASS AT [facility], [facility] IF STABLE. Discharge Planning     1.  WILL LIKELY PASS AT [facility]    MSW Assessment Completed by: Susy Wlof  02/06/18    Time In:11 AM       Time Out:13;00 PM

## 2018-02-06 NOTE — H&P
[facility] Help to Those in Need  [phone]    Patient Name: Hank Jansen  YOB: 1930    Date of Provider Hospice Visit: 02/05/18    Level of Care:   [] General Inpatient (GIP)    [x] Routine   [] Respite    Location of Care:  [] [facility] [] [address] [x] ED [facility] [] [facility] [] Matilda Altman [address] [place] [address]    Date of [facility] Admission: 2-5-18  Hospice Medical Director at time of admission: Dr. Victor M Hawthorne Diagnosis: Upper Gi bleed  Diagnoses RELATED to the terminal prognosis: dysphagia. Anemia, urosepsis  Other Diagnoses: dementia, cad. Diastolic chf, hx cva, dm-2, afib     HOSPICE SUMMARY   Do not cut and paste chart information other than imaging findings    Hank Jansen is a 80y.o. year old who was admitted to [facility]. Pt sent to Ed for dysphagia and admitted for GI bleed yesterday. Had large melena to maroon stools last night and again this afternoon. Hb dropped from 13 yesteray to 7.0 today. Transfused 1 unit prbc's. No GI endoscopy. Ongoing abdominal discomfort and headaches. Pt also with UTI--GNR in urine and blood cultures-started on iv abx, pt able to take in liquids only. Pt and family request comfort care only. Admitted to hospice today  The patient's principle diagnosis has resulted in sever anemia, abdominal pain and headaches   Refer to LCD     Functionally, the patient's Karnofsky and/or Palliative Performance Scale has declined over a period of days and is estimated at 30. The patient is dependent on the following ADLs:all    Objective information that support this patients limited prognosis includes: Hb  13.2 down to 7.0 on 2-4-18  Blood and urine cx--GNR      The patient/family chose comfort measures with the support of Hospice. HOSPICE DIAGNOSES   Active Symptoms:  1. Headaches  2. Abdominal pain  3. Weakness  4. confusion     PLAN   1. Admit to hospice at routine level of care  2. Morphine 2mg iv q1 hr prn pain  3.  Ativan 1 mg q4 hs prn restlessness  4. May have sips/clears    5.  and SW to support family needs  6. Disposition: to LTC if stable with cessation of melena and symptoms controlled    Prognosis estimated based on 02/05/18 clinical assessment is:   [] Hours to Days    [x] Days to Weeks    [] Other:    Communicated plan of care with: Hospice Case Manager;  Hospice IDT; Care Team     GOALS OF CARE     Resuscitation Status: DNR  Durable DNR: [] Yes [] No    Advance Care Planning 2/4/2018   Patient's Healthcare Decision Maker is: Verbal statement (Legal Next of Kin remains as decision maker)   Primary Decision Maker Name Margarita Garica   Primary Decision Maker Phone Number [phone]   Primary Decision Maker Relationship to Patient Adult child   Secondary Decision Maker Name Jacques Looney   Secondary Decision Maker Phone Number [phone]   Secondary Decision Maker Relationship to Patient Adult child   Confirm Advance Directive Yes, on file   Does the patient have other document types Do Not Resuscitate        HISTORY     History obtained from: chart and family and hospice RN    CHIEF COMPLAINT: headaces and upset stomach  The patient is:   [x] Verbal  [] Nonverbal  [] Unresponsive    HPI/SUBJECTIVE:   Large melena last night and this afternoon  Pt mostly lethargic and sleeping a lot       REVIEW OF SYSTEMS     The following systems were: [x] reviewed  [] unable to be reviewed    Positive ROS include:  Constitutional: fatigue, weakness, in pain,   Ears/nose/mouth/throat: dysphaigic  Respiratory:  Gastrointestinal:poor appetite, abdominal pain,  Musculoskeletal:pain, , swelling legs  Neurologic:confusion, , weakness  Psychiatric  Endocrine:     Adult Non-Verbal Pain Assessment Score: 1    Face  [] 0   No particular expression or smile  [x] 1   Occasional grimace, tearing, frowning, wrinkled forehead  [] 2   Frequent grimace, tearing, frowning, wrinkled forehead    Activity (movement)  [x] 0   Lying quietly, normal position  [] 1   Seeking attention through movement or slow, cautious movement  [] 2   Restless, excessive activity and/or withdrawal reflexes    Guarding  [x] 0   Lying quietly, no positioning of hands over areas of body  [] 1   Splinting areas of the body, tense  [] 2   Rigid, stiff    Physiology (vital signs)  [x] 0   Stable vital signs  [] 1   Change in any of the following: SBP > 20mm Hg; HR > 20/minute  [] 2   Change in any of the following: SBP > 30mm Hg; HR > 25/minute    Respiratory  [x] 0   Baseline RR/SpO2, compliant with ventilator  [] 1   RR > 10 above baseline, or 5% drop SpO2, mild asynchrony with ventilator  [] 2   RR > 20 above baseline, or 10% drop SpO2, asynchrony with ventilator     FUNCTIONAL ASSESSMENT     Palliative Performance Scale (PPS):30     PSYCHOSOCIAL/SPIRITUAL ASSESSMENT     Active Problems:    * No active hospital problems.  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    third-way heart bypass    CAD (coronary artery disease)     Chronic UTI     Congestive heart failure, unspecified     Depression     Hypercholesterolemia     Hypertension     Hypokalemia     Insomnia     Macular degeneration     Moderate mitral regurgitation     Murmur     NIDDM     daily medicatons for diabetes    Skin cancer     Stroke ([facility] [address] [phone],    4 strokes and 1 TIA    Sun-damaged skin     Sunburn, blistering     Unspecified vitamin D deficiency       Past Surgical History:   Procedure Laterality Date    HX CORONARY ARTERY BYPASS GRAFT  12/9/09    3v    HX CORONARY ARTERY BYPASS GRAFT      3 vessel    HX HAMMER TOE REPAIR      HX HYSTERECTOMY      at age 29    HX MOHS PROCEDURES Left 01/16/2017    SCC left medial calf by Dr. Gisella Zhao      bladder repair    IMPLANT  LOOP RECORDER  7/15/2016         ORAL SURGERY PROCEDURE      implants Social History   Substance Use Topics    Smoking status: Never Smoker    Smokeless tobacco: Never Used    Alcohol use 0.0 oz/week     0 Standard drinks or equivalent per week      Comment: once a year     Family History   Problem Relation Age of Onset    Diabetes Daughter     Heart Disease Daughter      stent placed    Stroke Daughter     Stroke Sister     Diabetes Mother       Allergies   Allergen Reactions    Aleve [Naproxen Sodium] Other (comments)     Caused stomach ulcer and thrush    Eliquis [Apixaban] Other (comments)     dizziness    Labetalol Other (comments)     dizzyness    Metformin Diarrhea     With both IR and ER     Plavix [Clopidogrel] Other (comments)     Pt. Aleatha Bis it was not effective      No current facility-administered medications for this encounter. PHYSICAL EXAM     Wt Readings from Last 3 Encounters:   02/04/18 116 lb (52.6 kg)   10/24/17 116 lb (52.6 kg)   10/15/17 121 lb 11.2 oz (55.2 kg)       There were no vitals taken for this visit. Supplemental O2  [] Yes  [x] NO  Last bowel movement:     Currently this patient has:  [x] Peripheral IV [] PICC  [] PORT [] ICD    [] Byrd Catheter [] NG Tube   [] PEG Tube    [] Rectal Tube [] Drain  [] Other:     Constitutional: elderly female, frail , pale but alert for a brief while, holding her forehead .  Able to answer basic questions and speak to family members  Eyes: perrl  ENMT: dry  Cardiovascular: irrr  Respiratory: clear  Gastrointestinal: soft ,mild discomfort to palpation  Musculoskeletal:edema of lower legs and ankles  Skin:intact  Neurologic:left hemiplegia  Psychiatric: alert  Other:       Pertinent Lab and or Imaging Tests:  Lab Results   Component Value Date/Time    Sodium 136 02/04/2018 10:02 AM    Potassium 4.2 02/04/2018 10:02 AM    Chloride 103 02/04/2018 10:02 AM    CO2 25 02/04/2018 10:02 AM    Anion gap 8 02/04/2018 10:02 AM    Glucose 197 02/04/2018 10:02 AM    BUN 15 02/04/2018 10:02 AM    Creatinine 0.78 02/04/2018 10:02 AM    BUN/Creatinine ratio 19 02/04/2018 10:02 AM    GFR est AA >60 02/04/2018 10:02 AM    GFR est non-AA >60 02/04/2018 10:02 AM    Calcium 8.9 02/04/2018 10:02 AM     Lab Results   Component Value Date/Time    Protein, total 6.6 02/04/2018 10:02 AM    Albumin 3.2 02/04/2018 10:02 AM           Total time: 50 min  Counseling / coordination time: d/w hospice RN and 2 daughters and son at bedside  > 50% counseling / coordination?:

## 2018-02-06 NOTE — PROGRESS NOTES
TRANSFER - OUT REPORT:    Verbal report given to Evette(name) on Phyliss Click  being transferred to Oncology(unit) for routine progression of care       Report consisted of patients Situation, Background, Assessment and   Recommendations(SBAR). Information from the following report(s) Kardex, Procedure Summary, Intake/Output, MAR and Recent Results was reviewed with the receiving nurse. Lines:   Peripheral IV 02/04/18 Right Antecubital (Active)   Site Assessment Clean, dry, & intact 2/6/2018  3:45 AM   Phlebitis Assessment 0 2/6/2018  3:45 AM   Infiltration Assessment 0 2/6/2018  3:45 AM   Dressing Status Clean, dry, & intact 2/6/2018  3:45 AM   Dressing Type Tape;Transparent 2/6/2018  3:45 AM   Hub Color/Line Status Pink;Flushed 2/6/2018  3:45 AM   Action Taken Blood drawn 2/4/2018 10:13 AM        Opportunity for questions and clarification was provided.       Patient transported with:   Registered Nurse

## 2018-02-06 NOTE — PROGRESS NOTES
Follow up visit on Oncology unit to assess if patient was able to partake of Holy Communion. She appeared to be resting soundly and did not stir when addressed. Daughters were at bedside offering update on events that have occurred since we met yesterday. They will page  if the patient wakes and expresses desire for sacrament of Holy Communion.     SHARI Escamilla, [facility], [address] [place] [address] Road Paging Service  [phone])

## 2018-02-07 NOTE — HOSPICE
[facility] RN note:  Family requesting meeting to discuss pain management. Family describing facial grimacing with any movement and requesting scheduled morphine in addition to the ativan, \"we want her not to experience any pain. \"  Discussed with Dr Frederic Cabezas. Will add morphine 1mg IV every 4 hrs with pre medication of any position changes. Family in agreement. Thank you for the opportunity to care for this pt and family. Please contact hospice at [phone] with any questions or concerns.

## 2018-02-07 NOTE — PROGRESS NOTES
Oncology Nursing Communication Tool  6:53 PM  2/7/2018     Bedside and Verbal shift change report given to John RN (incoming nurse) by Leydi Ndiaye RN (outgoing nurse) on [facility]. Report included the following information SBAR, Kardex, Intake/Output, MAR and Recent Results. Shift Summary: pain medications      Issues for physician to address: none         Oncology Shift Note   Admission Date 2/5/2018   Admission Diagnosis GI Bleed  GI bleed  GI bleed   Code Status Prior   Consults None      Cardiac Monitoring [] Yes [x] No      Purposeful Hourly Rounding [x] Yes    Raysa Score Total Score: 3   Raysa score 3 or > [] Bed Alarm [] Avasys [] 1:1 sitter [] Patient refused (Place signed refusal form in chart)      Pain Managed [x] Yes [] No    Key Pain Meds             acetaminophen (TYLENOL ARTHRITIS PAIN) 650 mg CR tablet Take 1 Tab by mouth two (2) times daily as needed for Pain. Influenza Vaccine Received Flu Vaccine for Current Season (usually Sept-March): Yes           Oxygen needs?  [x] Room air Oxygen @  []1L    []2L    []3L   []4L    []5L   []6L     Use home O2? [] Yes [x] No  Perform O2 challenge test using  smartphrase (.oxygenchallenge)      Last bowel movement Last Bowel Movement Date: 02/05/18  bowel movement      Urinary Catheter             LDAs               Peripheral IV 02/04/18 Right Antecubital (Active)   Site Assessment Clean, dry, & intact 2/7/2018  8:17 AM   Phlebitis Assessment 0 2/7/2018  8:17 AM   Infiltration Assessment 0 2/7/2018  8:17 AM   Dressing Status Clean, dry, & intact 2/7/2018  8:17 AM   Dressing Type Tape;Transparent 2/7/2018  8:17 AM   Hub Color/Line Status Pink;Capped;Flushed 2/7/2018  8:17 AM   Action Taken Blood drawn 2/4/2018 10:13 AM                         Readmission Risk Assessment Tool Score High Risk            27       Total Score        3 Has Seen PCP in Last 6 Months (Yes=3, No=0)    4 IP Visits Last 12 Months (1-3=4, 4=9, >4=11)    20 Charlson Comorbidity Score (Age + Comorbid Conditions)        Criteria that do not apply:    . Living with Significant Other. Assisted Living. LTAC. SNF. or   Rehab    Patient Length of Stay (>5 days = 3)    Pt.  Coverage (Medicare=5 , Medicaid, or Self-Pay=4)       Expected Length of Stay - - -   Actual Length of Stay 2          Anthony Ace RN

## 2018-02-07 NOTE — HOSPICE
[facility] Those in Need  [phone]    Routine Nursing Note   Patient Name: Alex Reeves  YOB: 1930  Age: 80 y.o. Date of Visit: 02/07/18  Facility of Care: [facility]  Patient Room: 1112/     Hospice Attending: Med Darnell MD  Hospice Diagnosis: GI Bleed  GI bleed  GI bleed    Level of Care: Routine    ASSESSMENT, PLAN AND INTERVENTIONS     1. Patient to continue at routine level of care  2. Patient requiring 3 doses of IV prn ativan in last 24 hours; patient with episode of agitation this morning; we will schedule 0.5 mg ativan IVP q4hrs; continue with ativan 1 mg q1hr IVP prn for terminal agitation  3. Continue with morphine 2 mg IVP q1hr prn for shortness of breath/generalized pain  4. Continue with scopolamine 1 mg transdermal patch [id number] prn and glycopyrollate 0.2 mg IVP q4hrs prn for excessive upper airway secretions  5. Hospice IDT to continue to coordinate care    Spiritual Interventions: patient self identifies as Jehovah's witness; hospice chaplain continuing to offer support to family    Psych/ Social/ Emotional Interventions: strong supportive family with three adult children Argentina Urrutia, and Jorden Guy; hospice MSW providing support to family    Care Coordination Needs: hospice RN, hospice MD, hospice chaplain, hospice MSW, and unit nursing staff continue to coordinate care    Care plan and New Orders discussed / approved with Rox Smith MD.    Description History and Chart Review     List number of doses of PRN medications in last 24 hours:  Medication 1: lorazepam 1 mg q1hr IVP  Number of doses: 3 doses    Medication 2: morphine 2 mg q1hr IVP  Number of doses: 2    Medication 3:   Number of doses:    DISCHARGE PLANNING     1. Discharge Plan: back to [facility] if symptoms become manageable and patient's condition stabilizes  2. Patient/Family teaching: family educated on symptom management and end of life conditons  3.  Response to patient/family teaching: family verbalized understanding of teaching    ASSESSMENT    KARNOFSKY: 20%    Prognosis estimated based on 02/07/18 clinical assessment is:   [] Few to Many Hours  [x] Hours to Days   [] Few to Many Days   [] Days to Weeks   [] Few to Many Weeks   [] Weeks to Months   [] Few to Many Months    Quality Measure: Patient self-reports:  [] Yes    [x] No    ESAS:   Time of Assessment: 1300  Pain (1-10):3  Fatigue (1-10): 2  Shortness of breath (1-10):3  Nausea (1-10): 0  Appetite (1-10):    Anxiety: (1-10):   Depression: (1-10):   Well-being: (1-10):   Constipation: _ Yes  _ No  LAST BM:     CLINICAL INFORMATION   Patient Vitals for the past 12 hrs:   Temp Pulse Resp BP SpO2   02/07/18 0725 97.3 °F (36.3 °C) 98 18 199/79 97 %       Currently this patient has:  [x] Supplemental O2   [x] IV    [] PICC      [] PORT   [] NG Tube    [] PEG Tube   [] Ostomy     [] Byrd draining _______ urine  [] Other:     SIGNS/PHYSICAL FINDINGS     Skin (including wound):  [] Warm, dry, supple, intact and color normal for race  [x] Warm   [x] Dry   [] Cool     [] Clammy       [] Diaphoretic    Turgor   [] Normal   [x] Decreased  Color:   [] Pink   [x] Pale   [] Cyanotic   [] Erythema   [] Jaundice   [] Normal for Race  []  Wounds:    Neuro:  [] Lethargy  [] Restlessness / agitation  [] Confusion / delirium  [] Hallucinations  [] Responds to maximal stimulation  [x] Unresponsive  [] Seizures     Cardiac:  [] Dyspnea on Exertion  [] JVD  [] Murmur  [] Palpitations  [] Hypotension  [x] Hypertension  [x] Tachycardia  [] Bradycardia  [] Irregular HR  [x] Pulses Decreased  [] Pulses Absent  [x] Edema:   BLE +1  [] Mottling:      (Location)    Respiratory:  Breath sounds:    [x] Diminished   [] Wheeze   [] Rhonchi   [] Rales   [x] Even and unlabored  [] Labored:            [] Cough   [] Non Productive   [] Productive    [] Description:           [] Deep suctioned   [x] O2 at 2 LPM  [] High flow oxygen greater than 10 LPM  [] Bi-Pap    GI  [x] Abdomen (soft, non-tender, non-distended)   [] Ascites  [] Nausea  [] Vomiting  [] Incontinent of bowels  [x] Bowel sounds (Hypoactive)  [] Diarrhea  [] Constipation (see above including last bowel movement)  [] Checked for impaction  [] Last BM     Nutrition  Diet:_Full liquid_________  Appetite:   [] Good   [] Fair   [x] Poor   [] Tube Feeding       [] Voiding  [x] Incontinent   [] Byrd    Musculoskeletal  [] Balance/[place] Unsteady   [x] Weak   Strength:    [] Normal    [] Limited    [x] Decreasing   Activities:    [] Up as tolerated   [x] Bedridden    [] Specify:    SAFETY  [x] 24 hr. Caregiver   [x] Side rails ? [x] Hospital bed   [] Reviewed Falls & Safety     ALLERGIES AND MEDICATIONS     Allergies: Allergies   Allergen Reactions    Aleve [Naproxen Sodium] Other (comments)     Caused stomach ulcer and thrush    Eliquis [Apixaban] Other (comments)     dizziness    Labetalol Other (comments)     dizzyness    Metformin Diarrhea     With both IR and ER     Plavix [Clopidogrel] Other (comments)     Pt.  Said it was not effective       Current Facility-Administered Medications   Medication Dose Route Frequency    morphine injection 2 mg  2 mg IntraVENous Q1H PRN    saline peripheral flush soln 5 mL  5 mL InterCATHeter PRN    ondansetron (ZOFRAN) injection 4 mg  4 mg IntraVENous Q4H PRN    ketorolac (TORADOL) injection 15 mg  15 mg IntraVENous Q6H PRN    scopolamine (TRANSDERM-SCOP) 1 mg 1 Patch  1 Patch TransDERmal Q72H PRN    glycopyrrolate (ROBINUL) injection 0.2 mg  0.2 mg IntraVENous Q4H PRN    LORazepam (ATIVAN) injection 1 mg  1 mg IntraVENous Q1H PRN          Visit Time In: 12:30  Visit Time Out: 13:30

## 2018-02-07 NOTE — PROGRESS NOTES
Oncology Nursing Communication Tool  7:23 AM  2/7/2018     Bedside shift change report given to Karen Alexandra RN (incoming nurse) by Ross Cunningham RN (outgoing nurse) on [facility]. Report included the following information SBAR, Kardex, MAR and Accordion. Shift Summary: Pt became restless around 2 am, medicated with ativan, pt settled down and slept through out the rest of the shift      Issues for physician to address: =         Oncology Shift Note   Admission Date 2/5/2018   Admission Diagnosis GI Bleed  GI bleed  GI bleed   Code Status Prior   Consults None      Cardiac Monitoring [] Yes [x] No      Purposeful Hourly Rounding [x] Yes    Raysa Score Total Score: 3   Raysa score 3 or > [] Bed Alarm [] Avasys [] 1:1 sitter [] Patient refused (Place signed refusal form in chart)      Pain Managed [x] Yes [] No    Key Pain Meds             acetaminophen (TYLENOL ARTHRITIS PAIN) 650 mg CR tablet Take 1 Tab by mouth two (2) times daily as needed for Pain. Influenza Vaccine Received Flu Vaccine for Current Season (usually Sept-March): Yes           Oxygen needs?  [x] Room air Oxygen @  []1L    []2L    []3L   []4L    []5L   []6L     Use home O2? [] Yes [] No  Perform O2 challenge test using  smartphrase (.oxygenchallenge)      Last bowel movement Last Bowel Movement Date: 02/05/18  bowel movement      Urinary Catheter             LDAs               Peripheral IV 02/04/18 Right Antecubital (Active)   Site Assessment Clean, dry, & intact 2/7/2018  3:15 AM   Phlebitis Assessment 0 2/7/2018  3:15 AM   Infiltration Assessment 0 2/7/2018  3:15 AM   Dressing Status Clean, dry, & intact 2/7/2018  3:15 AM   Dressing Type Transparent;Tape 2/7/2018  3:15 AM   Hub Color/Line Status Pink;Capped 2/7/2018  3:15 AM   Action Taken Blood drawn 2/4/2018 10:13 AM                         Readmission Risk Assessment Tool Score High Risk            27       Total Score        3 Has Seen PCP in Last 6 Months (Yes=3, No=0)    4 IP Visits Last 12 Months (1-3=4, 4=9, >4=11)    20 Charlson Comorbidity Score (Age + Comorbid Conditions)        Criteria that do not apply:    . Living with Significant Other. Assisted Living. LTAC. SNF. or   Rehab    Patient Length of Stay (>5 days = 3)    Pt.  Coverage (Medicare=5 , Medicaid, or Self-Pay=4)       Expected Length of Stay - - -   Actual Length of Stay [address], RN

## 2018-02-07 NOTE — PROGRESS NOTES
Oncology Nursing Communication Tool  3:45 PM  2/7/2018     Bedside and Verbal shift change report given to OLIVIER Fletcher (incoming nurse) by Jasmeet Rojo (outgoing nurse[facility]. Report included the following information SBAR, Kardex, MAR and Accordion. Shift Summary: Patient has rested comfortably with family at bedside throughout shift. Patient now has scheduled ativan. Patient has not had BM today. Issues for physician to address: none       Oncology Shift Note   Admission Date 2/5/2018   Admission Diagnosis GI Bleed  GI bleed  GI bleed   Code Status Prior   Consults None      Cardiac Monitoring [] Yes [x] No      Purposeful Hourly Rounding [x] Yes    Raysa Score Total Score: 3   Raysa score 3 or > [] Bed Alarm [] Avasys [] 1:1 sitter [] Patient refused (Place signed refusal form in chart)      Pain Managed [x] Yes [] No    Key Pain Meds             acetaminophen (TYLENOL ARTHRITIS PAIN) 650 mg CR tablet Take 1 Tab by mouth two (2) times daily as needed for Pain. Influenza Vaccine Received Flu Vaccine for Current Season (usually Sept-March): Yes           Oxygen needs?  [x] Room air Oxygen @  []1L    []2L    []3L   []4L    []5L   []6L     Use home O2? [] Yes [] No  Perform O2 challenge test using  smartphrase (.oxygenchallenge)      Last bowel movement Last Bowel Movement Date: 02/05/18  bowel movement      Urinary Catheter             LDAs               Peripheral IV 02/04/18 Right Antecubital (Active)   Site Assessment Clean, dry, & intact 2/7/2018  8:17 AM   Phlebitis Assessment 0 2/7/2018  8:17 AM   Infiltration Assessment 0 2/7/2018  8:17 AM   Dressing Status Clean, dry, & intact 2/7/2018  8:17 AM   Dressing Type Tape;Transparent 2/7/2018  8:17 AM   Hub Color/Line Status Pink;Capped;Flushed 2/7/2018  8:17 AM   Action Taken Blood drawn 2/4/2018 10:13 AM                         Readmission Risk Assessment Tool Score High Risk            27       Total Score 3 Has Seen PCP in Last 6 Months (Yes=3, No=0)    4 IP Visits Last 12 Months (1-3=4, 4=9, >4=11)    20 Charlson Comorbidity Score (Age + Comorbid Conditions)        Criteria that do not apply:    . Living with Significant Other. Assisted Living. LTAC. SNF. or   Rehab    Patient Length of Stay (>5 days = 3)    Pt.  Coverage (Medicare=5 , Medicaid, or Self-Pay=4)       Expected Length of Stay - - -   Actual Length of Stay [address]

## 2018-02-07 NOTE — PROGRESS NOTES
Chart review of pt's admission for high risk review call for ED [facility] patients. Pt noted to have been admitted initially for dysuria r/t ongoing UTI. Originally was dx'd at [facility] and Abx were scripted on Friday 2/2, but by the 4th they had not yet been started. Pt resides at the [facility]. AT [place], mobility is via [facility], and is ADL dependent on both staff and family. Pt Septic and initial work-up started. Pt had further complications of Anemia 2/2 GIB r/t anticoagulation tx for CVA prevention. Family and pt decided to stop treatment and convert to comfort care.  Transitioned to Hospice. (2/5)

## 2018-02-07 NOTE — HOSPICE
[facility]  [phone]    Patient Name: Hank Jansen  YOB: 1930    Date of Provider Hospice Visit: 02/07/18    Level of Care:   [] General Inpatient (GIP)    [x] Routine   [] Respite    Location of Care:  [] [facility] [] [facility] [x] [address] [facility] [] [facility] [] [facility]    Date of [address] Admission: 2-5-18  Hospice Medical Director at time of admission: Dr. Victor M Hawthorne Diagnosis: Upper Gi bleed  Diagnoses RELATED to the terminal prognosis: dysphagia. Anemia, urosepsis  Other Diagnoses: dementia, cad. Diastolic chf, hx cva, dm-2, afib     HOSPICE SUMMARY   Do not cut and paste chart information other than imaging findings    Hank Jansen is a 80y.o. year old who was admitted to [address] [place] [address]. Pt sent to Ed for dysphagia and admitted for GI bleed yesterday. Had large melena to maroon stools last night and again this afternoon. Hb dropped from 13 yesteray to 7.0 today. Transfused 1 unit prbc's. No GI endoscopy. Ongoing abdominal discomfort and headaches. Pt also with UTI--GNR in urine and blood cultures-started on iv abx, pt able to take in liquids only. Pt and family request comfort care only. Admitted to hospice today  The patient's principle diagnosis has resulted in sever anemia, abdominal pain and headaches   Refer to LCD     Functionally, the patient's Karnofsky and/or Palliative Performance Scale has declined over a period of days and is estimated at 30. The patient is dependent on the following ADLs:all    Objective information that support this patients limited prognosis includes: Hb  13.2 down to 7.0 on 2-4-18  Blood and urine cx--GNR      The patient/family chose comfort measures with the support of Hospice. HOSPICE DIAGNOSES   Active Symptoms:  1. Headaches  2. Abdominal pain  3. Weakness  4. confusion     PLAN   1. Admitted to hospice at routine level of care  2. Morphine 2mg iv q1 hr prn pain  3.  Ativan 1 mg q4 hs prn restlessness which has been effective  4. May have sips/clears    5.  and SW to support family needs  6. Disposition: to LTC if stable with cessation of melena and symptoms controlled    Prognosis estimated based on 02/07/18 clinical assessment is:   [] Hours to Days    [x] Days to Weeks    [] Other:    Communicated plan of care with: Hospice Case Manager;  Hospice IDT; Care Team     GOALS OF CARE     Resuscitation Status: DNR  Durable DNR: [] Yes [] No    Advance Care Planning 2/4/2018   Patient's Healthcare Decision Maker is: Verbal statement (Legal Next of Kin remains as decision maker)   Primary Decision Maker Name Jennifer Ro   Primary Decision Maker Phone Number [phone]   Primary Decision Maker Relationship to Patient Adult child   Secondary Decision Maker Name Los Larson   Secondary Decision Maker Phone Number [phone]   Secondary Decision Maker Relationship to Patient Adult child   Confirm Advance Directive Yes, on file   Does the patient have other document types Do Not Resuscitate        HISTORY     History obtained from: chart and family and hospice RN    CHIEF COMPLAINT:   The patient is:   [x] Verbal  [] Nonverbal  [] Unresponsive    HPI/SUBJECTIVE:   Very resltess at 2 am, medicated with ativan and slept rest of the morning  Restless and agitated yesterday per family  Minimal oral intake, sips       REVIEW OF SYSTEMS     The following systems were: [x] reviewed  [] unable to be reviewed    Positive ROS include:  Constitutional: fatigue, weakness, in pain,   Ears/nose/mouth/throat: dysphaigic  Respiratory:  Gastrointestinal:poor appetite, abdominal pain,  Musculoskeletal:pain, , swelling legs  Neurologic:confusion, , weakness  Psychiatric  Endocrine:     Adult Non-Verbal Pain Assessment Score: 1    Face  [] 0   No particular expression or smile  [x] 1   Occasional grimace, tearing, frowning, wrinkled forehead  [] 2   Frequent grimace, tearing, frowning, wrinkled forehead    Activity (movement)  [x] 0   Lying quietly, normal position  [] 1   Seeking attention through movement or slow, cautious movement  [] 2   Restless, excessive activity and/or withdrawal reflexes    Guarding  [x] 0   Lying quietly, no positioning of hands over areas of body  [] 1   Splinting areas of the body, tense  [] 2   Rigid, stiff    Physiology (vital signs)  [x] 0   Stable vital signs  [] 1   Change in any of the following: SBP > 20mm Hg; HR > 20/minute  [] 2   Change in any of the following: SBP > 30mm Hg; HR > 25/minute    Respiratory  [x] 0   Baseline RR/SpO2, compliant with ventilator  [] 1   RR > 10 above baseline, or 5% drop SpO2, mild asynchrony with ventilator  [] 2   RR > 20 above baseline, or 10% drop SpO2, asynchrony with ventilator     FUNCTIONAL ASSESSMENT     Palliative Performance Scale (PPS):30     PSYCHOSOCIAL/SPIRITUAL ASSESSMENT     Active Problems:    GI bleed (2/5/2018)      Past Medical History:   Diagnosis Date    A-fib [facility])     per patient and daughter   Angelica Farfan [facility]     Arthritis     Bladder infection, chronic     hx    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    third-way heart bypass    CAD (coronary artery disease)     Chronic UTI     Congestive heart failure, unspecified     Depression     Hypercholesterolemia     Hypertension     Hypokalemia     Insomnia     Macular degeneration     Moderate mitral regurgitation     Murmur     NIDDM     daily medicatons for diabetes    Skin cancer     Stroke ([facility] [address] [phone],    4 strokes and 1 TIA    Sun-damaged skin     Sunburn, blistering     Unspecified vitamin D deficiency       Past Surgical History:   Procedure Laterality Date    HX CORONARY ARTERY BYPASS GRAFT  12/9/09    3v    HX CORONARY ARTERY BYPASS GRAFT      3 vessel    HX HAMMER TOE REPAIR      HX HYSTERECTOMY      at age 29    HX MOHS PROCEDURES Left 01/16/2017    SCC left medial calf by Dr. Gu December      bladder repair    IMPLANT  LOOP RECORDER 7/15/2016         ORAL SURGERY PROCEDURE      implants      Social History   Substance Use Topics    Smoking status: Never Smoker    Smokeless tobacco: Never Used    Alcohol use 0.0 oz/week     0 Standard drinks or equivalent per week      Comment: once a year     Family History   Problem Relation Age of Onset    Diabetes Daughter     Heart Disease Daughter      stent placed    Stroke Daughter     Stroke Sister     Diabetes Mother       Allergies   Allergen Reactions    Aleve [Naproxen Sodium] Other (comments)     Caused stomach ulcer and thrush    Eliquis [Apixaban] Other (comments)     dizziness    Labetalol Other (comments)     dizzyness    Metformin Diarrhea     With both IR and ER     Plavix [Clopidogrel] Other (comments)     Pt.  Said it was not effective      Current Facility-Administered Medications   Medication Dose Route Frequency    morphine injection 2 mg  2 mg IntraVENous Q1H PRN    saline peripheral flush soln 5 mL  5 mL InterCATHeter PRN    ondansetron (ZOFRAN) injection 4 mg  4 mg IntraVENous Q4H PRN    ketorolac (TORADOL) injection 15 mg  15 mg IntraVENous Q6H PRN    scopolamine (TRANSDERM-SCOP) 1 mg 1 Patch  1 Patch TransDERmal Q72H PRN    glycopyrrolate (ROBINUL) injection 0.2 mg  0.2 mg IntraVENous Q4H PRN    LORazepam (ATIVAN) injection 1 mg  1 mg IntraVENous Q1H PRN        PHYSICAL EXAM     Wt Readings from Last 3 Encounters:   02/04/18 116 lb (52.6 kg)   10/24/17 116 lb (52.6 kg)   10/15/17 121 lb 11.2 oz (55.2 kg)       Visit Vitals    /79 (BP 1 Location: Right arm, BP Patient Position: At rest)    Pulse 98    Temp 97.3 °F (36.3 °C)    Resp 18    SpO2 97%       Supplemental O2  [] Yes  [x] NO  Last bowel movement:     Currently this patient has:  [x] Peripheral IV [] PICC  [] PORT [] ICD    [] Byrd Catheter [] NG Tube   [] PEG Tube    [] Rectal Tube [] Drain  [] Other:     Constitutional: elderly female, frail , pale but alert for a brief while, holding her forehead .  Able to answer basic questions and speak to family members  Eyes: perrl  ENMT: dry  Cardiovascular: irrr  Respiratory: clear  Gastrointestinal: soft ,mild discomfort to palpation  Musculoskeletal:edema of lower legs and ankles  Skin:intact  Neurologic:left hemiplegia  Psychiatric: alert  Other:       Pertinent Lab and or Imaging Tests:  Lab Results   Component Value Date/Time    Sodium 136 02/04/2018 10:02 AM    Potassium 4.2 02/04/2018 10:02 AM    Chloride 103 02/04/2018 10:02 AM    CO2 25 02/04/2018 10:02 AM    Anion gap 8 02/04/2018 10:02 AM    Glucose 197 (H) 02/04/2018 10:02 AM    BUN 15 02/04/2018 10:02 AM    Creatinine 0.78 02/04/2018 10:02 AM    BUN/Creatinine ratio 19 02/04/2018 10:02 AM    GFR est AA >60 02/04/2018 10:02 AM    GFR est non-AA >60 02/04/2018 10:02 AM    Calcium 8.9 02/04/2018 10:02 AM     Lab Results   Component Value Date/Time    Protein, total 6.6 02/04/2018 10:02 AM    Albumin 3.2 (L) 02/04/2018 10:02 AM           Total time: 20 min  Counseling / coordination time: d/w hospice RN and 1 daughters and son at bedside  > 50% counseling / coordination?:

## 2018-02-08 NOTE — HOSPICE
[facility] Help to Those in Need  [phone]    Patient Name: Allan Carreon  YOB: 1930    Date of Provider Hospice Visit: 02/08/18    Level of Care:   [] General Inpatient (GIP)    [x] Routine   [] Respite    Location of Care:  [] [facility] [] [facility] [x] ED [facility] [] [address] [] Josephinefrank Altman [address] [place] [address]    Date of [address] [facility] Admission: 2-5-18  Hospice Medical Director at time of admission: Dr. Samantha Black Diagnosis: Upper Gi bleed  Diagnoses RELATED to the terminal prognosis: dysphagia. Anemia, urosepsis  Other Diagnoses: dementia, cad. Diastolic chf, hx cva, dm-2, afib     HOSPICE SUMMARY   Do not cut and paste chart information other than imaging findings    Allan Carreon is a 80y.o. year old who was admitted to [facility]. Pt sent to Ed for dysphagia and admitted for GI bleed yesterday. Had large melena to maroon stools last night and again this afternoon. Hb dropped from 13 yesteray to 7.0 today. Transfused 1 unit prbc's. No GI endoscopy. Ongoing abdominal discomfort and headaches. Pt also with UTI--GNR in urine and blood cultures-started on iv abx, pt able to take in liquids only. Pt and family request comfort care only. Admitted to hospice today  The patient's principle diagnosis has resulted in sever anemia, abdominal pain and headaches   Refer to LCD     Functionally, the patient's Karnofsky and/or Palliative Performance Scale has declined over a period of days and is estimated at 30. The patient is dependent on the following ADLs:all    Objective information that support this patients limited prognosis includes: Hb  13.2 down to 7.0 on 2-4-18  Blood and urine cx--GNR      The patient/family chose comfort measures with the support of Hospice. HOSPICE DIAGNOSES   Active Symptoms:  1. Headaches  2. Abdominal pain  3. Weakness  4. confusion     PLAN   1. Admitted to hospice at routine level of care  2.  Continue Morphine `mg iv q4 hr and prn pain  3. Ativan 1 mg q4 hrs and prn restlessness which has been effective  4. NPO    5.  and SW to support family needs  6. Disposition: to LTC if stable with cessation of melena and symptoms controlled    Prognosis estimated based on 02/08/18 clinical assessment is:   [] Hours to Days    [x] Days to Weeks    [] Other:    Communicated plan of care with: Hospice Case Manager;  Hospice IDT; Care Team     GOALS OF CARE     Resuscitation Status: DNR  Durable DNR: [] Yes [] No    Advance Care Planning 2/7/2018   Patient's Healthcare Decision Maker is: Legal Next of Kin   Primary Decision Maker Name -   Primary Decision Maker Phone Number -   Primary Decision Maker Relationship to Patient -   Secondary Decision Maker Name -   Secondary Decision [address] Phone Number -   Secondary Decision Maker Relationship to Patient -   Confirm Advance Directive Yes, on file   Does the patient have other document types -        HISTORY     History obtained from: chart and family and hospice RN    CHIEF COMPLAINT:   The patient is:   [x] Verbal  [] Nonverbal  [] Unresponsive    HPI/SUBJECTIVE:   Has been much more comfortable with initiation of scheduled morphine and ativan yesterday  Family very appreciative  No melena today but had small amounts yesterday  Has not been awake at all today       REVIEW OF SYSTEMS     The following systems were: [x] reviewed  [] unable to be reviewed    Positive ROS include:  Constitutional: fatigue, weakness, in pain,   Ears/nose/mouth/throat: dysphaigic  Respiratory:  Gastrointestinal:poor appetite, abdominal pain,  Musculoskeletal:pain, , swelling legs  Neurologic:confusion, , weakness  Psychiatric  Endocrine:     Adult Non-Verbal Pain Assessment Score: 1    Face  [] 0   No particular expression or smile  [x] 1   Occasional grimace, tearing, frowning, wrinkled forehead  [] 2   Frequent grimace, tearing, frowning, wrinkled forehead    Activity (movement)  [x] 0   Lying quietly, normal position  [] 1 Seeking attention through movement or slow, cautious movement  [] 2   Restless, excessive activity and/or withdrawal reflexes    Guarding  [x] 0   Lying quietly, no positioning of hands over areas of body  [] 1   Splinting areas of the body, tense  [] 2   Rigid, stiff    Physiology (vital signs)  [x] 0   Stable vital signs  [] 1   Change in any of the following: SBP > 20mm Hg; HR > 20/minute  [] 2   Change in any of the following: SBP > 30mm Hg; HR > 25/minute    Respiratory  [x] 0   Baseline RR/SpO2, compliant with ventilator  [] 1   RR > 10 above baseline, or 5% drop SpO2, mild asynchrony with ventilator  [] 2   RR > 20 above baseline, or 10% drop SpO2, asynchrony with ventilator     FUNCTIONAL ASSESSMENT     Palliative Performance Scale (PPS):30     PSYCHOSOCIAL/SPIRITUAL ASSESSMENT     Active Problems:    GI bleed (2/5/2018)      Past Medical History:   Diagnosis Date    A-fib [facility])     per patient and daughter   Anabel Coleman [facility])     Arthritis     Bladder infection, chronic     hx    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    third-way heart bypass    CAD (coronary artery disease)     Chronic UTI     Congestive heart failure, unspecified     Depression     Hypercholesterolemia     Hypertension     Hypokalemia     Insomnia     Macular degeneration     Moderate mitral regurgitation     Murmur     NIDDM     daily medicatons for diabetes    Skin cancer     Stroke ([facility] [address] [phone],    4 strokes and 1 TIA    Sun-damaged skin     Sunburn, blistering     Unspecified vitamin D deficiency       Past Surgical History:   Procedure Laterality Date    HX CORONARY ARTERY BYPASS GRAFT  12/9/09    3v    HX CORONARY ARTERY BYPASS GRAFT      3 vessel    HX HAMMER TOE REPAIR      HX HYSTERECTOMY      at age 29    HX MOHS PROCEDURES Left 01/16/2017    SCC left medial calf by Dr. Gayle Snell      bladder repair    IMPLANT  LOOP RECORDER  7/15/2016         ORAL SURGERY PROCEDURE      implants Social History   Substance Use Topics    Smoking status: Never Smoker    Smokeless tobacco: Never Used    Alcohol use 0.0 oz/week     0 Standard drinks or equivalent per week      Comment: once a year     Family History   Problem Relation Age of Onset    Diabetes Daughter     Heart Disease Daughter      stent placed    Stroke Daughter     Stroke Sister     Diabetes Mother       Allergies   Allergen Reactions    Aleve [Naproxen Sodium] Other (comments)     Caused stomach ulcer and thrush    Eliquis [Apixaban] Other (comments)     dizziness    Labetalol Other (comments)     dizzyness    Metformin Diarrhea     With both IR and ER     Plavix [Clopidogrel] Other (comments)     Pt.  Said it was not effective      Current Facility-Administered Medications   Medication Dose Route Frequency    ketorolac (TORADOL) injection 30 mg  30 mg IntraVENous Q8H PRN    acetaminophen (TYLENOL) suppository 650 mg  650 mg Rectal Q4H PRN    LORazepam (ATIVAN) injection 0.5 mg  0.5 mg IntraVENous Q4H    morphine injection 1 mg  1 mg IntraVENous Q4H    morphine injection 2 mg  2 mg IntraVENous Q1H PRN    saline peripheral flush soln 5 mL  5 mL InterCATHeter PRN    ondansetron (ZOFRAN) injection 4 mg  4 mg IntraVENous Q4H PRN    scopolamine (TRANSDERM-SCOP) 1 mg 1 Patch  1 Patch TransDERmal Q72H PRN    glycopyrrolate (ROBINUL) injection 0.2 mg  0.2 mg IntraVENous Q4H PRN    LORazepam (ATIVAN) injection 1 mg  1 mg IntraVENous Q1H PRN        PHYSICAL EXAM     Wt Readings from Last 3 Encounters:   02/04/18 116 lb (52.6 kg)   10/24/17 116 lb (52.6 kg)   10/15/17 121 lb 11.2 oz (55.2 kg)       Visit Vitals    /54 (BP 1 Location: Right arm, BP Patient Position: At rest)    Pulse 95    Temp (!) 101.2 °F (38.4 °C)    Resp 16    SpO2 93%    Breastfeeding No       Supplemental O2  [] Yes  [x] NO  Last bowel movement:     Currently this patient has:  [x] Peripheral IV [] PICC  [] PORT [] ICD    [] Byrd Catheter [] NG Tube   [] PEG Tube    [] Rectal Tube [] Drain  [] Other:     Constitutional: elderly female, frail , pale , not awake, does not awaken during exam    Eyes: perrl  ENMT: dry  Cardiovascular: irrr  Respiratory: clear, shallow  Gastrointestinal: soft ,mild discomfort to palpation  Musculoskeletal:edema of lower legs and ankles  Skin:intact  Neurologic:left hemiplegia  Psychiatric: alert  Other:       Pertinent Lab and or Imaging Tests:  Lab Results   Component Value Date/Time    Sodium 136 02/04/2018 10:02 AM    Potassium 4.2 02/04/2018 10:02 AM    Chloride 103 02/04/2018 10:02 AM    CO2 25 02/04/2018 10:02 AM    Anion gap 8 02/04/2018 10:02 AM    Glucose 197 (H) 02/04/2018 10:02 AM    BUN 15 02/04/2018 10:02 AM    Creatinine 0.78 02/04/2018 10:02 AM    BUN/Creatinine ratio 19 02/04/2018 10:02 AM    GFR est AA >60 02/04/2018 10:02 AM    GFR est non-AA >60 02/04/2018 10:02 AM    Calcium 8.9 02/04/2018 10:02 AM     Lab Results   Component Value Date/Time    Protein, total 6.6 02/04/2018 10:02 AM    Albumin 3.2 (L) 02/04/2018 10:02 AM           Total time: 20 min  Counseling / coordination time: d/w hospice RN and 1 daughters and son at bedside  > 50% counseling / coordination?:

## 2018-02-08 NOTE — PROGRESS NOTES
Oncology Nursing Communication Tool  7:45 AM  2/8/2018     Bedside shift change report given to Ana Marshall RN (incoming nurse) by Kamilah Murphy RN (outgoing nurse) on Janis Verdin. Report included the following information SBAR, Kardex, MAR, Accordion and Recent Results. Shift Summary: Pain medication q4 hours, rested comfortably throughout the shift      Issues for physician to address: None         Oncology Shift Note   Admission Date 2/5/2018   Admission Diagnosis GI Bleed  GI bleed  GI bleed   Code Status Prior   Consults None      Cardiac Monitoring [] Yes [x] No      Purposeful Hourly Rounding [x] Yes    Raysa Score Total Score: 3   Raysa score 3 or > [] Bed Alarm [] Avasys [] 1:1 sitter [] Patient refused (Place signed refusal form in chart)      Pain Managed [x] Yes [] No    Key Pain Meds             acetaminophen (TYLENOL ARTHRITIS PAIN) 650 mg CR tablet Take 1 Tab by mouth two (2) times daily as needed for Pain. Influenza Vaccine Received Flu Vaccine for Current Season (usually Sept-March): Yes           Oxygen needs?  [x] Room air Oxygen @  []1L    []2L    []3L   []4L    []5L   []6L     Use home O2? [] Yes [x] No  Perform O2 challenge test using  smartphrase (.oxygenchallenge)      Last bowel movement Last Bowel Movement Date: 02/07/18  bowel movement      Urinary Catheter             LDAs               Peripheral IV 02/04/18 Right Antecubital (Active)   Site Assessment Clean, dry, & intact 2/7/2018  8:10 PM   Phlebitis Assessment 0 2/7/2018  8:10 PM   Infiltration Assessment 0 2/7/2018  8:10 PM   Dressing Status Clean, dry, & intact 2/7/2018  8:10 PM   Dressing Type Transparent;Tape 2/7/2018  8:10 PM   Hub Color/Line Status Pink;Capped 2/7/2018  8:10 PM   Action Taken Blood drawn 2/4/2018 10:13 AM                         Readmission Risk Assessment Tool Score High Risk            27       Total Score        3 Has Seen PCP in Last 6 Months (Yes=3, No=0)    4 IP Visits Last 12 Months (1-3=4, 4=9, >4=11)    20 Charlson Comorbidity Score (Age + Comorbid Conditions)        Criteria that do not apply:    . Living with Significant Other. Assisted Living. LTAC. SNF. or   Rehab    Patient Length of Stay (>5 days = 3)    Pt.  Coverage (Medicare=5 , Medicaid, or Self-Pay=4)       Expected Length of Stay - - -   Actual Length of Stay OLIVIER Osorio

## 2018-02-09 NOTE — HOSPICE
[facility] Help to Those in Need  [phone]    Patient Name: Wen Guadalupe  YOB: 1930    Date of Provider Hospice Visit: 02/09/18    Level of Care:   [] General Inpatient (GIP)    [x] Routine   [] Respite    Location of Care:  [] [facility] [] [facility] [x] [address] [] [facility] [] Matilda Altman [address] [facility]    Date of [address] [facility] [address] Admission: 2-5-18  Hospice Medical Director at time of admission: Dr. James Pearl Diagnosis: Upper Gi bleed  Diagnoses RELATED to the terminal prognosis: dysphagia. Anemia, urosepsis  Other Diagnoses: dementia, cad. Diastolic chf, hx cva, dm-2, afib     HOSPICE SUMMARY   Do not cut and paste chart information other than imaging findings    Wen Guadalupe is a 80y.o. year old who was admitted to [facility]. Pt sent to Ed for dysphagia and admitted for GI bleed yesterday. Had large melena to maroon stools last night and again this afternoon. Hb dropped from 13 yesteray to 7.0 today. Transfused 1 unit prbc's. No GI endoscopy. Ongoing abdominal discomfort and headaches. Pt also with UTI--GNR in urine and blood cultures-started on iv abx, pt able to take in liquids only. Pt and family request comfort care only. Admitted to hospice today  The patient's principle diagnosis has resulted in sever anemia, abdominal pain and headaches   Refer to LCD     Functionally, the patient's Karnofsky and/or Palliative Performance Scale has declined over a period of days and is estimated at 30. The patient is dependent on the following ADLs:all    Objective information that support this patients limited prognosis includes: Hb  13.2 down to 7.0 on 2-4-18  Blood and urine cx--GNR      The patient/family chose comfort measures with the support of Hospice. HOSPICE DIAGNOSES   Active Symptoms:  1. Headaches  2. Abdominal pain  3. Weakness  4. confusion     PLAN   1. Admitted to hospice at routine level of care  2.  Continue Morphine `mg iv q4 hr and prn pain  3. Ativan 1 mg q4 hrs and prn restlessness which has been effective  4. NPO    5.  and SW to support family needs  6. Disposition: to LTC if stable with cessation of melena and symptoms controlled    Prognosis estimated based on 02/09/18 clinical assessment is:   [] Hours to Days    [x] Days to Weeks    [] Other:    Communicated plan of care with: Hospice Case Manager;  Hospice IDT; Care Team     GOALS OF CARE     Resuscitation Status: DNR  Durable DNR: [] Yes [] No    Advance Care Planning 2/7/2018   Patient's Healthcare Decision Maker is: Legal Next of Kin   Primary Decision Maker Name -   Primary Decision Maker Phone Number -   Primary Decision Maker Relationship to Patient -   Secondary Decision Maker Name -   Secondary Decision [address] Phone Number -   Secondary Decision Maker Relationship to Patient -   Confirm Advance Directive Yes, on file   Does the patient have other document types -        HISTORY     History obtained from: chart and family and hospice RN    CHIEF COMPLAINT:   The patient is:   [x] Verbal  [] Nonverbal  [] Unresponsive    HPI/SUBJECTIVE:   Remains unresponsive on morphine and ativan for pain and restlessness  Now with shallow course breath sounds       REVIEW OF SYSTEMS     The following systems were: [x] reviewed  [] unable to be reviewed    Positive ROS include:  Constitutional: fatigue, weakness, in pain,   Ears/nose/mouth/throat: dysphaigic  Respiratory:  Gastrointestinal:poor appetite, abdominal pain,  Musculoskeletal:pain, , swelling legs  Neurologic:confusion, , weakness  Psychiatric  Endocrine:     Adult Non-Verbal Pain Assessment Score: 1    Face  [] 0   No particular expression or smile  [x] 1   Occasional grimace, tearing, frowning, wrinkled forehead  [] 2   Frequent grimace, tearing, frowning, wrinkled forehead    Activity (movement)  [x] 0   Lying quietly, normal position  [] 1   Seeking attention through movement or slow, cautious movement  [] 2   Restless, excessive activity and/or withdrawal reflexes    Guarding  [x] 0   Lying quietly, no positioning of hands over areas of body  [] 1   Splinting areas of the body, tense  [] 2   Rigid, stiff    Physiology (vital signs)  [x] 0   Stable vital signs  [] 1   Change in any of the following: SBP > 20mm Hg; HR > 20/minute  [] 2   Change in any of the following: SBP > 30mm Hg; HR > 25/minute    Respiratory  [x] 0   Baseline RR/SpO2, compliant with ventilator  [] 1   RR > 10 above baseline, or 5% drop SpO2, mild asynchrony with ventilator  [] 2   RR > 20 above baseline, or 10% drop SpO2, asynchrony with ventilator     FUNCTIONAL ASSESSMENT     Palliative Performance Scale (PPS):30     PSYCHOSOCIAL/SPIRITUAL ASSESSMENT     Active Problems:    GI bleed (2/5/2018)      Past Medical History:   Diagnosis Date    A-fib [facility])     per patient and daughter   Felicia Garnett [facility])     Arthritis     Bladder infection, chronic     hx    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    third-way heart bypass    CAD (coronary artery disease)     Chronic UTI     Congestive heart failure, unspecified     Depression     Hypercholesterolemia     Hypertension     Hypokalemia     Insomnia     Macular degeneration     Moderate mitral regurgitation     Murmur     NIDDM     daily medicatons for diabetes    Skin cancer     Stroke ([facility] [address] [phone],    4 strokes and 1 TIA    Sun-damaged skin     Sunburn, blistering     Unspecified vitamin D deficiency       Past Surgical History:   Procedure Laterality Date    HX CORONARY ARTERY BYPASS GRAFT  12/9/09    3v    HX CORONARY ARTERY BYPASS GRAFT      3 vessel    HX HAMMER TOE REPAIR      HX HYSTERECTOMY      at age 29    HX MOHS PROCEDURES Left 01/16/2017    SCC left medial calf by Dr. Ying Licona      bladder repair    IMPLANT  LOOP RECORDER  7/15/2016         ORAL SURGERY PROCEDURE      implants      Social History   Substance Use Topics    Smoking status: Never Smoker    Smokeless tobacco: Never Used    Alcohol use 0.0 oz/week     0 Standard drinks or equivalent per week      Comment: once a year     Family History   Problem Relation Age of Onset    Diabetes Daughter     Heart Disease Daughter      stent placed    Stroke Daughter     Stroke Sister     Diabetes Mother       Allergies   Allergen Reactions    Aleve [Naproxen Sodium] Other (comments)     Caused stomach ulcer and thrush    Eliquis [Apixaban] Other (comments)     dizziness    Labetalol Other (comments)     dizzyness    Metformin Diarrhea     With both IR and ER     Plavix [Clopidogrel] Other (comments)     Pt.  Said it was not effective      Current Facility-Administered Medications   Medication Dose Route Frequency    morphine 2 mg  2 mg IntraVENous Q1H PRN    morphine injection 1 mg  1 mg IntraVENous Q4H    ketorolac (TORADOL) injection 30 mg  30 mg IntraVENous Q8H PRN    acetaminophen (TYLENOL) suppository 650 mg  650 mg Rectal Q4H PRN    LORazepam (ATIVAN) injection 0.5 mg  0.5 mg IntraVENous Q4H    saline peripheral flush soln 5 mL  5 mL InterCATHeter PRN    ondansetron (ZOFRAN) injection 4 mg  4 mg IntraVENous Q4H PRN    scopolamine (TRANSDERM-SCOP) 1 mg 1 Patch  1 Patch TransDERmal Q72H PRN    glycopyrrolate (ROBINUL) injection 0.2 mg  0.2 mg IntraVENous Q4H PRN    LORazepam (ATIVAN) injection 1 mg  1 mg IntraVENous Q1H PRN        PHYSICAL EXAM     Wt Readings from Last 3 Encounters:   02/04/18 116 lb (52.6 kg)   10/24/17 116 lb (52.6 kg)   10/15/17 121 lb 11.2 oz (55.2 kg)       Visit Vitals    /41 (BP 1 Location: Right arm, BP Patient Position: At rest)    Pulse 83    Temp 99.7 °F (37.6 °C)    Resp 12    SpO2 90%    Breastfeeding No       Supplemental O2  [] Yes  [x] NO  Last bowel movement:     Currently this patient has:  [x] Peripheral IV [] PICC  [] PORT [] ICD    [] Byrd Catheter [] NG Tube   [] PEG Tube    [] Rectal Tube [] Drain  [] Other:     Constitutional: elderly female, frail , pale , not awake, does not awaken during exam    Eyes: perrl  ENMT: dry  Cardiovascular: irrr  Respiratory: clear, shallow, course breath sounds  Gastrointestinal: soft ,mild discomfort to palpation  Musculoskeletal:edema of lower legs and ankles  Skin:intact  Neurologic:left hemiplegia  Psychiatric: alert  Other:       Pertinent Lab and or Imaging Tests:  Lab Results   Component Value Date/Time    Sodium 136 02/04/2018 10:02 AM    Potassium 4.2 02/04/2018 10:02 AM    Chloride 103 02/04/2018 10:02 AM    CO2 25 02/04/2018 10:02 AM    Anion gap 8 02/04/2018 10:02 AM    Glucose 197 (H) 02/04/2018 10:02 AM    BUN 15 02/04/2018 10:02 AM    Creatinine 0.78 02/04/2018 10:02 AM    BUN/Creatinine ratio 19 02/04/2018 10:02 AM    GFR est AA >60 02/04/2018 10:02 AM    GFR est non-AA >60 02/04/2018 10:02 AM    Calcium 8.9 02/04/2018 10:02 AM     Lab Results   Component Value Date/Time    Protein, total 6.6 02/04/2018 10:02 AM    Albumin 3.2 (L) 02/04/2018 10:02 AM           Total time: 20 min  Counseling / coordination time: d/w hospice RN and  son at bedside  > 50% counseling / coordination?:

## 2018-02-09 NOTE — HOSPICE
[facility]  [phone]    Routine Nursing Note   Patient Name: Janis Verdin  YOB: 1930  Age: 80 y.o. Date of Visit: 02/09/18  Facility of Care: [facility]  Patient Room: [address] [place] [address]     Hospice Attending: Miguel Angel Cuevas MD  Hospice Diagnosis: GI Bleed  GI bleed  GI bleed    Level of Care: Routine    ASSESSMENT, PLAN AND INTERVENTIONS     1. Patient admitted to Routine Level of Care  2. Patient is unresponsive, respiratory rate of 22, breathing is erratic and shallow, hands and feet are cold to the touch, patient on room air with cyanosis appearing in fingertips, family at bedside. 3. Patient appears to be actively dying with intermittent fevers, morphine 1mg IV scheduled every 4 hours, Ativan 0.5mg scheduled every 4 hours, prn use of Robinul for secretions, and toradol for fever. Spiritual Interventions:  visit was made today    Psych/ Social/ Emotional Interventions: none at this time, daughter and great nephew from [place] were visiting at bedside. They are at peace with the eventual passing of their loved one. They spoke of their love for her. They have said that everyone she cared for has come by or spoken to her on the telephone to say their [address] [place]. Care Coordination Needs: None at this time    Care plan and New Orders discussed / approved with Dr. Teresa Rowe MD.    Description History and Chart Review     List number of doses of PRN medications in last 24 hours:  Medication 1: Robinul  Number of doses:1    Medication 2: Toradol  Number of doses: 1    Medication 3:   Number of doses:    DISCHARGE PLANNING     1. Discharge Plan: appears imminent and will pass here at [facility]  2. Patient/Family teaching: end of life signs and symptoms, daughter stated she was breathing 8bpm and now it is 22, breathing is erratic at times. 3. Response to patient/family teaching: understood, and is accepting, grateful for care provided.      ASSESSMENT KARNOFSKY: 10    Prognosis estimated based on 02/09/18 clinical assessment is:   [x] Few to Many Hours  [] Hours to Days   [] Few to Many Days   [] Days to Weeks   [] Few to Many Weeks   [] Weeks to Months   [] Few to Many Months    Quality Measure: Patient self-reports:  [] Yes    [x] No    ESAS:   Time of Assessment: 1330  Pain (1-10):7  Fatigue (1-10): 7  Shortness of breath (1-10):7  Nausea (1-10): 0  Appetite (1-10):    Anxiety: (1-10):   Depression: (1-10):   Well-being: (1-10):   Constipation: _ Yes  x_ No  LAST BM: 2/7/18    CLINICAL INFORMATION   Patient Vitals for the past 12 hrs:   Temp Pulse Resp BP SpO2   02/09/18 0730 99.7 °F (37.6 °C) 83 12 125/41 90 %       Currently this patient has:  [] Supplemental O2   [x] IV    [] PICC      [] PORT   [] NG Tube    [] PEG Tube   [] Ostomy     [] Byrd draining _______ urine  [] Other:     SIGNS/PHYSICAL FINDINGS     Skin (including wound):  [] Warm, dry, supple, intact and color normal for race  [] Warm   [x] Dry   [x] Cool     [] Clammy       [] Diaphoretic    Turgor   [] Normal   [x] Decreased  Color:   [] Pink   [] Pale   [x] Cyanotic   [] Erythema   [] Jaundice   [] Normal for Race    [x]  Wounds:left lower buttock, left lower leg    Neuro:  [] Lethargy  [] Restlessness / agitation  [] Confusion / delirium  [] Hallucinations  [] Responds to maximal stimulation  [x] Unresponsive  [] Seizures     Cardiac:  [x] Dyspnea on Exertion  [] JVD  [] Murmur  [] Palpitations  [] Hypotension  [] Hypertension  [] Tachycardia  [] Bradycardia  [x] Irregular HR  [x] Pulses Decreased  [] Pulses Absent  [] Edema:       (Location, Grade and Pitting)  [] Mottling:      (Location)    Respiratory:  Breath sounds:    [x] Diminished   [] Wheeze   [] Rhonchi   [] Rales   [] Even and unlabored  [x] Labored:     22       [] Cough   [] Non Productive   [] Productive    [] Description:           [] Deep suctioned   [] O2 at ___ LPM  [] High flow oxygen greater than 10 LPM  [] Bi-Pap    GI  [x] Abdomen (describe) flat  [] Ascites  [] Nausea  [] Vomiting  [] Incontinent of bowels  [x] Bowel sounds (yes/no)  [] Diarrhea  [] Constipation (see above including last bowel movement)  [] Checked for impaction  [x] Last BM 2/7/18    Nutrition  Diet:__NPO________  Appetite:   [] Good   [] Fair   [] Poor   [] Tube Feeding       [] Voiding  [x] Incontinent   [] Byrd    Musculoskeletal  [] Balance/[place] Unsteady   [x] Weak   Strength:    [] Normal    [] Limited    [x] Decreasing   Activities:    [] Up as tolerated   [x] Bedridden    [] Specify:    SAFETY  [] 24 hr. Caregiver   [x] Side rails ? [x] Hospital bed   [] Reviewed Falls & Safety     ALLERGIES AND MEDICATIONS     Allergies: Allergies   Allergen Reactions    Aleve [Naproxen Sodium] Other (comments)     Caused stomach ulcer and thrush    Eliquis [Apixaban] Other (comments)     dizziness    Labetalol Other (comments)     dizzyness    Metformin Diarrhea     With both IR and ER     Plavix [Clopidogrel] Other (comments)     Pt.  Said it was not effective       Current Facility-Administered Medications   Medication Dose Route Frequency    morphine 2 mg  2 mg IntraVENous Q1H PRN    morphine injection 1 mg  1 mg IntraVENous Q4H    ketorolac (TORADOL) injection 30 mg  30 mg IntraVENous Q8H PRN    acetaminophen (TYLENOL) suppository 650 mg  650 mg Rectal Q4H PRN    LORazepam (ATIVAN) injection 0.5 mg  0.5 mg IntraVENous Q4H    saline peripheral flush soln 5 mL  5 mL InterCATHeter PRN    ondansetron (ZOFRAN) injection 4 mg  4 mg IntraVENous Q4H PRN    scopolamine (TRANSDERM-SCOP) 1 mg 1 Patch  1 Patch TransDERmal Q72H PRN    glycopyrrolate (ROBINUL) injection 0.2 mg  0.2 mg IntraVENous Q4H PRN    LORazepam (ATIVAN) injection 1 mg  1 mg IntraVENous Q1H PRN          Visit Time In: 1330  Visit Time Out: 1400

## 2018-02-09 NOTE — PROGRESS NOTES
Oncology Nursing Communication Tool  7:27 AM  2/9/2018     Bedside shift change report given to OLIVIER Gutiérrez (incoming nurse) by Joey Boogie RN (outgoing nurse) on [facility]. Report included the following information SBAR, Kardex, MAR and Accordion. Shift Summary: Pt has become more apneic, turned q4, decreased urine output, no BM      Issues for physician to address: Oncology Shift Note   Admission Date 2/5/2018   Admission Diagnosis GI Bleed  GI bleed  GI bleed   Code Status Prior   Consults None      Cardiac Monitoring [] Yes [x] No      Purposeful Hourly Rounding [x] Yes    Raysa Score Total Score: 3   Raysa score 3 or > [] Bed Alarm [] Avasys [] 1:1 sitter [] Patient refused (Place signed refusal form in chart)      Pain Managed [x] Yes [] No    Key Pain Meds             acetaminophen (TYLENOL ARTHRITIS PAIN) 650 mg CR tablet Take 1 Tab by mouth two (2) times daily as needed for Pain. Influenza Vaccine Received Flu Vaccine for Current Season (usually Sept-March): Yes           Oxygen needs?  [x] Room air Oxygen @  []1L    []2L    []3L   []4L    []5L   []6L     Use home O2? [] Yes [x] No  Perform O2 challenge test using  smartphrase (.oxygenchallenge)      Last bowel movement Last Bowel Movement Date: 02/07/18  bowel movement      Urinary Catheter             LDAs               Peripheral IV 02/08/18 Right Forearm (Active)   Site Assessment Clean, dry, & intact 2/9/2018  2:50 AM   Phlebitis Assessment 0 2/9/2018  2:50 AM   Infiltration Assessment 0 2/9/2018  2:50 AM   Dressing Status Clean, dry, & intact 2/9/2018  2:50 AM   Dressing Type Transparent;Tape 2/9/2018  2:50 AM   Hub Color/Line Status Blue;Capped 2/9/2018  2:50 AM                         Readmission Risk Assessment Tool Score High Risk            24       Total Score        4 IP Visits Last 12 Months (1-3=4, 4=9, >4=11)    20 Charlson Comorbidity Score (Age + Comorbid Conditions)        Criteria that do not apply:    Has Seen PCP in Last 6 Months (Yes=3, No=0)    . Living with Significant Other. Assisted Living. LTAC. SNF. or   Rehab    Patient Length of Stay (>5 days = 3)    Pt.  Coverage (Medicare=5 , Medicaid, or Self-Pay=4)       Expected Length of Stay - - -   Actual Length of Stay [facility], RN

## 2018-02-09 NOTE — HOSPICE
DOMENIC [facility] MSW note: In to see pt and her daughters Chioma Abdul and Erick Gee. Pt continues to be unresponsive. Daughter have bee holding bedside antonio. Both daughter are exhausted and are leaving the bedside to day about noon when pts  Grandchildren arrive. MSW and daughter discussed coping with pts decline. Daughter will take a needed break today. Daughters talked about pts challenging dementia related behaviors and the relational loss they experienced. MSW provided supportive counseling. MSW and family discussed the grief process. MSW will continue to monitor and assess pt and family needs.

## 2018-02-09 NOTE — PROGRESS NOTES
Physical Therapy Screening:  Services are not indicated at this time. [facility] based on results obtained during the nursing admission assessment. The patients chart was reviewed and the patient is not appropriate for a skilled therapy evaluation at this time. Please consult physical therapy if any therapy needs arise. Thank you.     Anayeli Sterling, PT

## 2018-02-09 NOTE — PROGRESS NOTES
Oncology Nursing Communication Tool  8:23 PM  2/8/2018     Bedside and Verbal shift change report given to Shazia Low RN (incoming nurse) by Vargas Bañuelos RN (outgoing nurse[facility]. Report included the following information SBAR, Kardex, Intake/Output and MAR. Shift Summary: Patient with one febrile episode today requiring administration of Toradol. Incontinent of small amount of urine x 1. Breathing becoming apneic wnd shallow      Issues for physician to address: None         Oncology Shift Note   Admission Date 2/5/2018   Admission Diagnosis GI Bleed  GI bleed  GI bleed   Code Status Prior   Consults None      Cardiac Monitoring [] Yes [x] No      Purposeful Hourly Rounding [x] Yes    Raysa Score Total Score: 3   Raysa score 3 or > [] Bed Alarm [] Avasys [] 1:1 sitter [] Patient refused (Place signed refusal form in chart)      Pain Managed [x] Yes [] No    Key Pain Meds             acetaminophen (TYLENOL ARTHRITIS PAIN) 650 mg CR tablet Take 1 Tab by mouth two (2) times daily as needed for Pain. Influenza Vaccine Received Flu Vaccine for Current Season (usually Sept-March): Yes           Oxygen needs?  [x] Room air Oxygen @  []1L    []2L    []3L   []4L    []5L   []6L     Use home O2? [] Yes [x] No  Perform O2 challenge test using  smartphrase (.oxygenchallenge)      Last bowel movement Last Bowel Movement Date: 02/07/18  bowel movement      Urinary Catheter             LDAs               Peripheral IV 02/08/18 Right Forearm (Active)   Site Assessment Clean, dry, & intact 2/8/2018  8:07 PM   Phlebitis Assessment 0 2/8/2018 11:02 AM   Infiltration Assessment 0 2/8/2018 11:02 AM   Dressing Status Clean, dry, & intact 2/8/2018 11:02 AM   Dressing Type Transparent;Tape 2/8/2018 11:02 AM   Hub Color/Line Status Blue;Flushed;Patent 2/8/2018 11:02 AM                         Readmission Risk Assessment Tool Score High Risk            24       Total Score        4 IP Visits Last 12 Months (1-3=4, 4=9, >4=11)    20 Charlson Comorbidity Score (Age + Comorbid Conditions)        Criteria that do not apply:    Has Seen PCP in Last 6 Months (Yes=3, No=0)    . Living with Significant Other. Assisted Living. LTAC. SNF. or   Rehab    Patient Length of Stay (>5 days = 3)    Pt.  Coverage (Medicare=5 , Medicaid, or Self-Pay=4)       Expected Length of Stay - - -   Actual Length of Stay 3          Tan Zurita RN

## 2018-02-09 NOTE — HOSPICE
Routine spiritual care visit with patient daughter and nephew. The patient was not responsive during the visit. The nephew shared that his grandmother had been peaceful and comfortable. The daughter shared the exhaustion she is experiencing . she went through this with her father so is familiar with the dying process. The  validated her emotions and concerns. The  and daughter discussed end-of-life concerns. She is prepared for her mother to die and is at peace with all that is occurring. She is having a hard time with the waiting ./The daughter shared plans she has for after the death which include self-care for her and her sister. The  applauded her resilience and awareness of her needs for wellbeing. No needs or concerns at this time but the family was appreciative of the visit.

## 2018-02-10 NOTE — PROGRESS NOTES
RN went into room to give midnight medications. PCT came into room to help turn and change patient. After pt was changed and turned, RN noticed a lack of breaths. Upon further assessment, no pulse was felt and no lung sounds auscultated. Another RN was called in to verify. TOD was 0040.   0042: Nursing supervisor Serafin Grider notified. 9461Cara Gary paged. 65: ED physician Dr Yi Miguel paged to pronounce. [phone]: Hospice [id number] paged. 36: Patient's daughter Real Walker was called to inform of patient's death. 0100: [facility] called, approved release of body.

## 2018-02-10 NOTE — PROGRESS NOTES
Responded to page from nursing staff on Oncology regarding patient death. Met with daughter Shayan Jain and her son Griselda Ba and offered condolences. She indicated that her sister was en-route to the hospital. Family indicated not having any needs at this time. Advised availability as needed.  Home[place] [facility] [place] [address] [place] [address]. Rev.  Sherren Cushing, Chaplain  Paging Service: [phone])

## 2018-02-10 NOTE — PROGRESS NOTES
Called to pronounce patient. No response to noxious stimuli. No spontaneous breath sounds nor cardiac sounds. Pupils fixed and dilated. TOD: 0040. Family notified and grieving appropriately.    D/C Summary, death certificate, and death note to be completed by Attending MD.  Charline Natarajan MD

## 2018-02-12 NOTE — DISCHARGE SUMMARY
Hospice Discharge Summary    [facility]  [facility]        Date of Admission: 2/5/2018  Date of Discharge: 2/10/2018    Amado Gonzalez is a 80y.o. year old who was admitted to [facility] at [facility] with a Hospice diagnosis of GI Bleed;GI bleed;GI bleed. The patient's care was focused on comfort and the patient passed away on 2/10/2018. Amado Gonzalez is a 80y.o. year old who was admitted to [facility]. Pt sent to Ed for dysphagia and admitted for GI bleed yesterday. Had large melena to maroon stools last night and again this afternoon. Hb dropped from 13 yesteray to 7.0 today. Transfused 1 unit prbc's. No GI endoscopy. Ongoing abdominal discomfort and headaches. Pt also with UTI--GNR in urine and blood cultures-started on iv abx, pt able to take in liquids only. Pt and family request comfort care only.  Admitted to hospice today  The patient's principle diagnosis has resulted in sever anemia, abdominal pain and headaches   Refer to LCD   Add in Hospice Summary through Plan from most recent Progress Note
